# Patient Record
Sex: FEMALE | Race: WHITE | HISPANIC OR LATINO
[De-identification: names, ages, dates, MRNs, and addresses within clinical notes are randomized per-mention and may not be internally consistent; named-entity substitution may affect disease eponyms.]

---

## 2017-02-22 ENCOUNTER — APPOINTMENT (OUTPATIENT)
Dept: FAMILY MEDICINE | Facility: CLINIC | Age: 82
End: 2017-02-22

## 2017-02-22 VITALS
DIASTOLIC BLOOD PRESSURE: 70 MMHG | BODY MASS INDEX: 23.04 KG/M2 | HEIGHT: 63 IN | HEART RATE: 54 BPM | OXYGEN SATURATION: 97 % | WEIGHT: 130 LBS | TEMPERATURE: 97.8 F | SYSTOLIC BLOOD PRESSURE: 127 MMHG

## 2017-03-13 ENCOUNTER — APPOINTMENT (OUTPATIENT)
Dept: FAMILY MEDICINE | Facility: CLINIC | Age: 82
End: 2017-03-13

## 2017-03-13 VITALS
WEIGHT: 124 LBS | TEMPERATURE: 98 F | HEIGHT: 63 IN | SYSTOLIC BLOOD PRESSURE: 135 MMHG | OXYGEN SATURATION: 98 % | HEART RATE: 66 BPM | BODY MASS INDEX: 21.97 KG/M2 | DIASTOLIC BLOOD PRESSURE: 76 MMHG

## 2017-04-01 ENCOUNTER — MEDICATION RENEWAL (OUTPATIENT)
Age: 82
End: 2017-04-01

## 2017-04-05 ENCOUNTER — RX RENEWAL (OUTPATIENT)
Age: 82
End: 2017-04-05

## 2017-04-19 ENCOUNTER — APPOINTMENT (OUTPATIENT)
Dept: FAMILY MEDICINE | Facility: CLINIC | Age: 82
End: 2017-04-19

## 2017-04-19 VITALS
HEIGHT: 63 IN | BODY MASS INDEX: 21.79 KG/M2 | HEART RATE: 63 BPM | WEIGHT: 123 LBS | SYSTOLIC BLOOD PRESSURE: 137 MMHG | DIASTOLIC BLOOD PRESSURE: 77 MMHG | TEMPERATURE: 97.6 F | OXYGEN SATURATION: 98 %

## 2017-04-19 DIAGNOSIS — D58.2 OTHER HEMOGLOBINOPATHIES: ICD-10-CM

## 2017-05-01 LAB
25(OH)D3 SERPL-MCNC: 21.6 NG/ML
ALBUMIN SERPL ELPH-MCNC: 4.5 G/DL
ALP BLD-CCNC: 53 U/L
ALT SERPL-CCNC: 19 U/L
ANION GAP SERPL CALC-SCNC: 17 MMOL/L
AST SERPL-CCNC: 25 U/L
BASOPHILS # BLD AUTO: 0.02 K/UL
BASOPHILS NFR BLD AUTO: 0.2 %
BILIRUB SERPL-MCNC: 0.6 MG/DL
BUN SERPL-MCNC: 9 MG/DL
CALCIUM SERPL-MCNC: 9.1 MG/DL
CHLORIDE SERPL-SCNC: 102 MMOL/L
CO2 SERPL-SCNC: 22 MMOL/L
CREAT SERPL-MCNC: 0.68 MG/DL
EOSINOPHIL # BLD AUTO: 0.02 K/UL
EOSINOPHIL NFR BLD AUTO: 0.2 %
GLUCOSE SERPL-MCNC: 99 MG/DL
HCT VFR BLD CALC: 47.8 %
HGB BLD-MCNC: 15.5 G/DL
IMM GRANULOCYTES NFR BLD AUTO: 0.2 %
LYMPHOCYTES # BLD AUTO: 1.47 K/UL
LYMPHOCYTES NFR BLD AUTO: 14.8 %
MAN DIFF?: NORMAL
MCHC RBC-ENTMCNC: 30.8 PG
MCHC RBC-ENTMCNC: 32.4 GM/DL
MCV RBC AUTO: 94.8 FL
MONOCYTES # BLD AUTO: 0.58 K/UL
MONOCYTES NFR BLD AUTO: 5.9 %
NEUTROPHILS # BLD AUTO: 7.8 K/UL
NEUTROPHILS NFR BLD AUTO: 78.7 %
PLATELET # BLD AUTO: 209 K/UL
POTASSIUM SERPL-SCNC: 4.4 MMOL/L
PROT SERPL-MCNC: 7.6 G/DL
RBC # BLD: 5.04 M/UL
RBC # FLD: 14.2 %
SODIUM SERPL-SCNC: 141 MMOL/L
WBC # FLD AUTO: 9.91 K/UL

## 2017-05-16 ENCOUNTER — APPOINTMENT (OUTPATIENT)
Dept: FAMILY MEDICINE | Facility: CLINIC | Age: 82
End: 2017-05-16

## 2017-05-16 VITALS
WEIGHT: 124 LBS | OXYGEN SATURATION: 97 % | DIASTOLIC BLOOD PRESSURE: 66 MMHG | SYSTOLIC BLOOD PRESSURE: 117 MMHG | HEART RATE: 58 BPM | HEIGHT: 63 IN | TEMPERATURE: 97 F | BODY MASS INDEX: 21.97 KG/M2

## 2017-05-16 DIAGNOSIS — Z23 ENCOUNTER FOR IMMUNIZATION: ICD-10-CM

## 2017-06-19 ENCOUNTER — RESULT CHARGE (OUTPATIENT)
Age: 82
End: 2017-06-19

## 2017-06-20 ENCOUNTER — APPOINTMENT (OUTPATIENT)
Dept: FAMILY MEDICINE | Facility: CLINIC | Age: 82
End: 2017-06-20

## 2017-06-20 VITALS
DIASTOLIC BLOOD PRESSURE: 85 MMHG | WEIGHT: 122 LBS | HEART RATE: 67 BPM | OXYGEN SATURATION: 98 % | HEIGHT: 63 IN | TEMPERATURE: 97.9 F | BODY MASS INDEX: 21.62 KG/M2 | SYSTOLIC BLOOD PRESSURE: 173 MMHG

## 2017-06-20 VITALS — SYSTOLIC BLOOD PRESSURE: 146 MMHG | DIASTOLIC BLOOD PRESSURE: 72 MMHG

## 2017-06-20 DIAGNOSIS — L23.9 ALLERGIC CONTACT DERMATITIS, UNSPECIFIED CAUSE: ICD-10-CM

## 2017-06-20 DIAGNOSIS — R19.00 INTRA-ABDOMINAL AND PELVIC SWELLING, MASS AND LUMP, UNSPECIFIED SITE: ICD-10-CM

## 2017-06-20 DIAGNOSIS — Z71.89 OTHER SPECIFIED COUNSELING: ICD-10-CM

## 2017-06-22 LAB
25(OH)D3 SERPL-MCNC: 18.3 NG/ML
ANION GAP SERPL CALC-SCNC: 11 MMOL/L
BUN SERPL-MCNC: 13 MG/DL
CALCIUM SERPL-MCNC: 9.5 MG/DL
CHLORIDE SERPL-SCNC: 107 MMOL/L
CHOLEST SERPL-MCNC: 183 MG/DL
CHOLEST/HDLC SERPL: 2.5 RATIO
CO2 SERPL-SCNC: 24 MMOL/L
CREAT SERPL-MCNC: 0.74 MG/DL
GLUCOSE SERPL-MCNC: 106 MG/DL
HDLC SERPL-MCNC: 74 MG/DL
LDLC SERPL CALC-MCNC: 89 MG/DL
POTASSIUM SERPL-SCNC: 4.3 MMOL/L
SODIUM SERPL-SCNC: 142 MMOL/L
TRIGL SERPL-MCNC: 99 MG/DL
TSH SERPL-ACNC: 3.37 UIU/ML

## 2017-08-09 ENCOUNTER — RX RENEWAL (OUTPATIENT)
Age: 82
End: 2017-08-09

## 2017-08-31 ENCOUNTER — MEDICATION RENEWAL (OUTPATIENT)
Age: 82
End: 2017-08-31

## 2017-09-01 ENCOUNTER — RX RENEWAL (OUTPATIENT)
Age: 82
End: 2017-09-01

## 2017-09-07 ENCOUNTER — APPOINTMENT (OUTPATIENT)
Dept: FAMILY MEDICINE | Facility: CLINIC | Age: 82
End: 2017-09-07
Payer: MEDICARE

## 2017-09-07 VITALS
DIASTOLIC BLOOD PRESSURE: 68 MMHG | TEMPERATURE: 98.5 F | WEIGHT: 120 LBS | OXYGEN SATURATION: 97 % | SYSTOLIC BLOOD PRESSURE: 161 MMHG | HEIGHT: 63 IN | BODY MASS INDEX: 21.26 KG/M2 | HEART RATE: 62 BPM

## 2017-09-07 PROCEDURE — 90662 IIV NO PRSV INCREASED AG IM: CPT

## 2017-09-07 PROCEDURE — 99213 OFFICE O/P EST LOW 20 MIN: CPT | Mod: 25

## 2017-09-07 PROCEDURE — G0008: CPT

## 2017-09-11 ENCOUNTER — MEDICATION RENEWAL (OUTPATIENT)
Age: 82
End: 2017-09-11

## 2017-09-11 RX ORDER — ALPRAZOLAM 0.25 MG/1
0.25 TABLET ORAL
Qty: 60 | Refills: 1 | Status: COMPLETED | OUTPATIENT
Start: 2017-03-13 | End: 2017-09-11

## 2017-11-13 ENCOUNTER — APPOINTMENT (OUTPATIENT)
Dept: FAMILY MEDICINE | Facility: CLINIC | Age: 82
End: 2017-11-13
Payer: MEDICARE

## 2017-11-13 VITALS
TEMPERATURE: 97.6 F | HEART RATE: 60 BPM | OXYGEN SATURATION: 97 % | BODY MASS INDEX: 21.26 KG/M2 | DIASTOLIC BLOOD PRESSURE: 68 MMHG | SYSTOLIC BLOOD PRESSURE: 126 MMHG | HEIGHT: 63 IN | WEIGHT: 120 LBS

## 2017-11-13 PROCEDURE — 99214 OFFICE O/P EST MOD 30 MIN: CPT

## 2017-12-12 ENCOUNTER — APPOINTMENT (OUTPATIENT)
Dept: FAMILY MEDICINE | Facility: CLINIC | Age: 82
End: 2017-12-12
Payer: MEDICARE

## 2017-12-12 VITALS
HEIGHT: 63 IN | OXYGEN SATURATION: 96 % | SYSTOLIC BLOOD PRESSURE: 163 MMHG | BODY MASS INDEX: 21.62 KG/M2 | DIASTOLIC BLOOD PRESSURE: 78 MMHG | HEART RATE: 74 BPM | TEMPERATURE: 97.5 F | WEIGHT: 122 LBS

## 2017-12-12 PROCEDURE — 99213 OFFICE O/P EST LOW 20 MIN: CPT

## 2018-01-08 ENCOUNTER — MEDICATION RENEWAL (OUTPATIENT)
Age: 83
End: 2018-01-08

## 2018-01-24 ENCOUNTER — RX RENEWAL (OUTPATIENT)
Age: 83
End: 2018-01-24

## 2018-01-26 ENCOUNTER — RX RENEWAL (OUTPATIENT)
Age: 83
End: 2018-01-26

## 2018-01-26 ENCOUNTER — MEDICATION RENEWAL (OUTPATIENT)
Age: 83
End: 2018-01-26

## 2018-03-27 ENCOUNTER — APPOINTMENT (OUTPATIENT)
Dept: FAMILY MEDICINE | Facility: CLINIC | Age: 83
End: 2018-03-27
Payer: MEDICARE

## 2018-03-27 VITALS
HEIGHT: 63 IN | OXYGEN SATURATION: 97 % | BODY MASS INDEX: 21.62 KG/M2 | WEIGHT: 122 LBS | TEMPERATURE: 98.6 F | SYSTOLIC BLOOD PRESSURE: 143 MMHG | DIASTOLIC BLOOD PRESSURE: 62 MMHG | HEART RATE: 69 BPM

## 2018-03-27 PROCEDURE — 99213 OFFICE O/P EST LOW 20 MIN: CPT

## 2018-04-16 ENCOUNTER — RX RENEWAL (OUTPATIENT)
Age: 83
End: 2018-04-16

## 2018-05-31 ENCOUNTER — RX RENEWAL (OUTPATIENT)
Age: 83
End: 2018-05-31

## 2018-06-01 ENCOUNTER — APPOINTMENT (OUTPATIENT)
Dept: FAMILY MEDICINE | Facility: CLINIC | Age: 83
End: 2018-06-01
Payer: MEDICARE

## 2018-06-01 VITALS
SYSTOLIC BLOOD PRESSURE: 127 MMHG | WEIGHT: 128 LBS | OXYGEN SATURATION: 97 % | HEART RATE: 65 BPM | TEMPERATURE: 97.9 F | BODY MASS INDEX: 22.68 KG/M2 | HEIGHT: 63 IN | DIASTOLIC BLOOD PRESSURE: 72 MMHG

## 2018-06-01 PROCEDURE — 99213 OFFICE O/P EST LOW 20 MIN: CPT

## 2018-06-01 NOTE — HISTORY OF PRESENT ILLNESS
[FreeTextEntry1] : " I need prescription refills" [de-identified] : The patient presents to the office for prescription refills, no acute complaints at this time.

## 2018-06-01 NOTE — PHYSICAL EXAM
[No Acute Distress] : no acute distress [Well Nourished] : well nourished [Well Developed] : well developed [Well-Appearing] : well-appearing [No JVD] : no jugular venous distention [Supple] : supple [No Lymphadenopathy] : no lymphadenopathy [Thyroid Normal, No Nodules] : the thyroid was normal and there were no nodules present [No Respiratory Distress] : no respiratory distress  [Clear to Auscultation] : lungs were clear to auscultation bilaterally [No Accessory Muscle Use] : no accessory muscle use [Normal Rate] : normal rate  [Regular Rhythm] : with a regular rhythm [Normal S1, S2] : normal S1 and S2 [No Murmur] : no murmur heard [General Appearance - Alert] : alert [General Appearance - In No Acute Distress] : in no acute distress [Neck Appearance] : the appearance of the neck was normal [Neck Cervical Mass (___cm)] : no neck mass was observed [Jugular Venous Distention Increased] : there was no jugular-venous distention [Thyroid Diffuse Enlargement] : the thyroid was not enlarged [Thyroid Nodule] : there were no palpable thyroid nodules [] : no respiratory distress [Auscultation Breath Sounds / Voice Sounds] : lungs were clear to auscultation bilaterally [Heart Rate And Rhythm] : heart rate was normal and rhythm regular [Heart Sounds] : normal S1 and S2 [Heart Sounds Gallop] : no gallops [Murmurs] : no murmurs [Heart Sounds Pericardial Friction Rub] : no pericardial rub

## 2018-06-29 ENCOUNTER — MEDICATION RENEWAL (OUTPATIENT)
Age: 83
End: 2018-06-29

## 2018-07-02 ENCOUNTER — MEDICATION RENEWAL (OUTPATIENT)
Age: 83
End: 2018-07-02

## 2018-07-02 ENCOUNTER — RX RENEWAL (OUTPATIENT)
Age: 83
End: 2018-07-02

## 2018-07-12 ENCOUNTER — RX RENEWAL (OUTPATIENT)
Age: 83
End: 2018-07-12

## 2018-07-12 ENCOUNTER — APPOINTMENT (OUTPATIENT)
Dept: FAMILY MEDICINE | Facility: CLINIC | Age: 83
End: 2018-07-12

## 2018-07-23 ENCOUNTER — APPOINTMENT (OUTPATIENT)
Dept: FAMILY MEDICINE | Facility: CLINIC | Age: 83
End: 2018-07-23
Payer: MEDICARE

## 2018-07-23 VITALS
DIASTOLIC BLOOD PRESSURE: 75 MMHG | HEIGHT: 63 IN | TEMPERATURE: 97.8 F | HEART RATE: 65 BPM | OXYGEN SATURATION: 100 % | WEIGHT: 126 LBS | SYSTOLIC BLOOD PRESSURE: 130 MMHG | BODY MASS INDEX: 22.32 KG/M2

## 2018-07-23 DIAGNOSIS — M25.579 PAIN IN UNSPECIFIED ANKLE AND JOINTS OF UNSPECIFIED FOOT: ICD-10-CM

## 2018-07-23 DIAGNOSIS — Z91.81 HISTORY OF FALLING: ICD-10-CM

## 2018-07-23 DIAGNOSIS — Z92.29 PERSONAL HISTORY OF OTHER DRUG THERAPY: ICD-10-CM

## 2018-07-23 DIAGNOSIS — Z23 ENCOUNTER FOR IMMUNIZATION: ICD-10-CM

## 2018-07-23 DIAGNOSIS — Z86.59 PERSONAL HISTORY OF OTHER MENTAL AND BEHAVIORAL DISORDERS: ICD-10-CM

## 2018-07-23 DIAGNOSIS — Z87.09 PERSONAL HISTORY OF OTHER DISEASES OF THE RESPIRATORY SYSTEM: ICD-10-CM

## 2018-07-23 DIAGNOSIS — M25.519 PAIN IN UNSPECIFIED SHOULDER: ICD-10-CM

## 2018-07-23 DIAGNOSIS — Z78.9 OTHER SPECIFIED HEALTH STATUS: ICD-10-CM

## 2018-07-23 DIAGNOSIS — M79.646 PAIN IN UNSPECIFIED FINGER(S): ICD-10-CM

## 2018-07-23 LAB
ALBUMIN SERPL ELPH-MCNC: 4.7 G/DL
ALP BLD-CCNC: 56 U/L
ALT SERPL-CCNC: 19 U/L
ANION GAP SERPL CALC-SCNC: 14 MMOL/L
APPEARANCE: ABNORMAL
AST SERPL-CCNC: 30 U/L
BACTERIA: NEGATIVE
BASOPHILS # BLD AUTO: 0.03 K/UL
BASOPHILS NFR BLD AUTO: 0.4 %
BILIRUB SERPL-MCNC: 0.7 MG/DL
BILIRUBIN URINE: NEGATIVE
BLOOD URINE: NEGATIVE
BUN SERPL-MCNC: 14 MG/DL
CALCIUM SERPL-MCNC: 9.2 MG/DL
CHLORIDE SERPL-SCNC: 105 MMOL/L
CHOLEST SERPL-MCNC: 181 MG/DL
CHOLEST/HDLC SERPL: 2.8 RATIO
CO2 SERPL-SCNC: 22 MMOL/L
COLOR: YELLOW
CREAT SERPL-MCNC: 0.57 MG/DL
EOSINOPHIL # BLD AUTO: 0.11 K/UL
EOSINOPHIL NFR BLD AUTO: 1.3 %
GLUCOSE QUALITATIVE U: NEGATIVE
GLUCOSE SERPL-MCNC: 102 MG/DL
HCT VFR BLD CALC: 43.4 %
HDLC SERPL-MCNC: 65 MG/DL
HGB BLD-MCNC: 14.8 G/DL
HIV1+2 AB SPEC QL IA.RAPID: NONREACTIVE
IMM GRANULOCYTES NFR BLD AUTO: 0.4 %
KETONES URINE: NEGATIVE
LDLC SERPL CALC-MCNC: 97 MG/DL
LEUKOCYTE ESTERASE URINE: NEGATIVE
LYMPHOCYTES # BLD AUTO: 1.74 K/UL
LYMPHOCYTES NFR BLD AUTO: 21.1 %
MAN DIFF?: NORMAL
MCHC RBC-ENTMCNC: 31.9 PG
MCHC RBC-ENTMCNC: 34.1 GM/DL
MCV RBC AUTO: 93.5 FL
MICROSCOPIC-UA: NORMAL
MONOCYTES # BLD AUTO: 0.53 K/UL
MONOCYTES NFR BLD AUTO: 6.4 %
NEUTROPHILS # BLD AUTO: 5.79 K/UL
NEUTROPHILS NFR BLD AUTO: 70.4 %
NITRITE URINE: NEGATIVE
PH URINE: 6
PLATELET # BLD AUTO: 212 K/UL
POTASSIUM SERPL-SCNC: 4.2 MMOL/L
PROT SERPL-MCNC: 7.7 G/DL
PROTEIN URINE: ABNORMAL
RBC # BLD: 4.64 M/UL
RBC # FLD: 14.2 %
RED BLOOD CELLS URINE: 2 /HPF
SODIUM SERPL-SCNC: 141 MMOL/L
SPECIFIC GRAVITY URINE: 1.02
SQUAMOUS EPITHELIAL CELLS: NEGATIVE
TRIGL SERPL-MCNC: 95 MG/DL
URINE COMMENTS: NORMAL
UROBILINOGEN URINE: NEGATIVE
WBC # FLD AUTO: 8.23 K/UL
WHITE BLOOD CELLS URINE: 2 /HPF

## 2018-07-23 PROCEDURE — G0439: CPT | Mod: 25

## 2018-07-23 PROCEDURE — 36415 COLL VENOUS BLD VENIPUNCTURE: CPT | Mod: 59

## 2018-07-23 NOTE — HEALTH RISK ASSESSMENT
[Good] : ~his/her~  mood as  good [One fall no injury in past year] : Patient reported one fall in the past year without injury [0] : 2) Feeling down, depressed, or hopeless: Not at all (0) [Patient reported PAP Smear was normal] : Patient reported PAP Smear was normal [HIV Test offered] : HIV Test offered [Hepatitis C test offered] : Hepatitis C test offered [None] : None [With Family] : lives with family [# of Members in Household ___] :  household currently consist of [unfilled] member(s) [Retired] : retired [College] : College [] :  [# Of Children ___] : has [unfilled] children [Feels Safe at Home] : Feels safe at home [Fully functional (bathing, dressing, toileting, transferring, walking, feeding)] : Fully functional (bathing, dressing, toileting, transferring, walking, feeding) [Fully functional (using the telephone, shopping, preparing meals, housekeeping, doing laundry, using] : Fully functional and needs no help or supervision to perform IADLs (using the telephone, shopping, preparing meals, housekeeping, doing laundry, using transportation, managing medications and managing finances) [Smoke Detector] : smoke detector [Carbon Monoxide Detector] : carbon monoxide detector [Seat Belt] :  uses seat belt [] : No [de-identified] : fell at home 2 weeks ago, slipped coming out of bed. [[id number]] : 0 [Change in mental status noted] : No change in mental status noted [Language] : denies difficulty with language [Behavior] : denies difficulty with behavior [Learning/Retaining New Information] : denies difficulty learning/retaining new information [Handling Complex Tasks] : denies difficulty handling complex tasks [Reasoning] : denies difficulty with reasoning [Spatial Ability and Orientation] : denies difficulty with spatial ability and orientation [Sexually Active] : not sexually active [High Risk Behavior] : no high risk behavior [Reports changes in hearing] : Reports no changes in hearing [Reports changes in vision] : Reports no changes in vision [Reports changes in dental health] : Reports no changes in dental health [Guns at Home] : no guns at home [Sunscreen] : does not use sunscreen [TB Exposure] : is not being exposed to tuberculosis [MammogramDate] : 10/207 [MammogramComments] : BI-RADS 2 [PapSmearDate] : 10/2017 [BoneDensityDate] : 10/2016 [BoneDensityComments] : Osteoporosis [ColonoscopyDate] : CANNOT RECALL [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

## 2018-07-23 NOTE — COUNSELING
[Weight management counseling provided] : Weight management [Healthy eating counseling provided] : healthy eating [Activity counseling provided] : activity [Behavioral health counseling provided] : behavioral health  [Sleep ___ hours/day] : Sleep [unfilled] hours/day [None] : None [Good understanding] : Patient has a good understanding of lifestyle changes and the steps needed to achieve self management goals

## 2018-07-23 NOTE — HISTORY OF PRESENT ILLNESS
[FreeTextEntry1] : "I am here for my physical exam " [de-identified] : This is an 86-year-old female originally from [facility] with past medical history of hypercholesterolemia, hypertension, insomnia, osteoporosis, memory difficulties, right bundle branch block, vitamin D deficiency, anxiety, presenting to my office for complete physical exam. The patient offers no new complaints at this time.

## 2018-07-23 NOTE — ASSESSMENT
[FreeTextEntry1] : This is an 86-year-old female [facility] with past medical history of hypercholesterolemia, hypertension, insomnia, osteoporosis, breast CA s/p Right breast Mastectomy 10 yrs ago, memory difficulties, right bundle branch block, vitamin D deficiency, anxiety, presenting to my office for complete physical exam.\par \par Cardiovascular: History of hyperlipidemia, hypertension, right bundle branch block.\par -Patient's blood pressure currently well-controlled.\par -Continue pravastatin 20 mg at bedtime\par -Continue current diet and exercise as tolerated.\par \par Psych/neuro: History anxiety, memory difficulties.\par -Continue alprazolam 0.25 mg t.i.d. p.r.n., I stop reviewed.\par -Continue Namenda 10 mg daily, continue Seroquel 25 mg twice a day.\par -Patient advised to spend some time with memory games, crossword puzzles.\par \par Rheumatology: History of osteoporosis, vitamin D insufficiency.\par -Continue alendronate 70 mg once a week.\par -Patient on vitamin D supplementation, we will recheck levels today and dose medication accordingly.\par \par Health care maintenance:\par -Patient would have asked him to return in the office today.\par -Last mammogram in October 2017, Bi-rads 2\par -Last PAP smear last year, as per GYN no need for further examinations.\par -Last Bone Density 2016 found with Osteoporosis.\par -Pt states had colonoscopy many years ago, found with no abnormalities.

## 2018-07-23 NOTE — PAST MEDICAL HISTORY
[Postmenopausal] : postmenopausal [Menarche Age ____] : age at menarche was [unfilled] [Total Preg ___] : G[unfilled] [Live Births ___] : P[unfilled]  [Full Term ___] : Full Term: [unfilled] [Living ___] : Living: [unfilled]

## 2018-07-23 NOTE — PHYSICAL EXAM

## 2018-07-24 LAB
25(OH)D3 SERPL-MCNC: 14.1 NG/ML
HCV AB SER QL: NONREACTIVE
HCV S/CO RATIO: 0.09 S/CO
TSH SERPL-ACNC: 3.6 UIU/ML

## 2018-08-20 ENCOUNTER — RX RENEWAL (OUTPATIENT)
Age: 83
End: 2018-08-20

## 2018-09-21 ENCOUNTER — RX RENEWAL (OUTPATIENT)
Age: 83
End: 2018-09-21

## 2018-10-08 ENCOUNTER — RX RENEWAL (OUTPATIENT)
Age: 83
End: 2018-10-08

## 2018-12-20 ENCOUNTER — MEDICATION RENEWAL (OUTPATIENT)
Age: 83
End: 2018-12-20

## 2018-12-21 ENCOUNTER — APPOINTMENT (OUTPATIENT)
Dept: FAMILY MEDICINE | Facility: CLINIC | Age: 83
End: 2018-12-21
Payer: MEDICARE

## 2018-12-21 VITALS
BODY MASS INDEX: 22.5 KG/M2 | DIASTOLIC BLOOD PRESSURE: 75 MMHG | SYSTOLIC BLOOD PRESSURE: 170 MMHG | HEART RATE: 82 BPM | TEMPERATURE: 97.8 F | OXYGEN SATURATION: 96 % | WEIGHT: 127 LBS | HEIGHT: 63 IN

## 2018-12-21 DIAGNOSIS — E78.00 PURE HYPERCHOLESTEROLEMIA, UNSPECIFIED: ICD-10-CM

## 2018-12-21 PROCEDURE — 99213 OFFICE O/P EST LOW 20 MIN: CPT

## 2018-12-21 NOTE — HEALTH RISK ASSESSMENT
[No falls in past year] : Patient reported no falls in the past year [0] : 2) Feeling down, depressed, or hopeless: Not at all (0) [] : No [[id number]] : 0

## 2018-12-21 NOTE — ASSESSMENT
[FreeTextEntry1] : This is an 86-year-old female originally from [facility] with past medical history of hypercholesterolemia, hypertension, insomnia, osteoporosis, breast CA s/p Right breast Mastectomy 10 yrs ago, memory difficulties, right bundle branch block, vitamin D deficiency, anxiety, presenting to the office for complete physical exam.\par \par Cardiovascular: History of hyperlipidemia, hypertension, right bundle branch block.\par -Patient's blood pressure slightly elevated today, maintain current regimen.\par -Continue pravastatin 20 mg at bedtime\par -Continue current diet and exercise as tolerated.\par \par Psych/neuro: History anxiety, memory difficulties.\par -Continue alprazolam 0.25 mg t.i.d. p.r.n., I stop reviewed.\par -Continue Namenda 10 mg daily, continue Seroquel 25 mg twice a day.\par -Patient advised to spend some time with memory games, crossword puzzles.\par \par Rheumatology: History of osteoporosis, vitamin D insufficiency.\par -Continue alendronate 70 mg once a week.\par -Patient on vitamin D supplementation, medication refilled.\par \par Health care maintenance:\par -Last mammogram in October 2017, Bi-rads 2\par -Last PAP smear last year, as per GYN no need for further examinations.\par -Last Bone Density 2016 found with Osteoporosis.\par -Pt states had colonoscopy many years ago, found with no abnormalities.

## 2018-12-21 NOTE — HISTORY OF PRESENT ILLNESS
[FreeTextEntry1] : " I need prescription refills" [de-identified] : The patient presents to the office for prescription refills, no acute complaints at this time.

## 2018-12-21 NOTE — PLAN
[FreeTextEntry1] : \par \par Case discussed with and reviewed by supervising attending Dr Yolis Gifford M.D.

## 2018-12-21 NOTE — PHYSICAL EXAM
[No Acute Distress] : no acute distress [Well Nourished] : well nourished [Well Developed] : well developed [Well-Appearing] : well-appearing [No JVD] : no jugular venous distention [Supple] : supple [No Lymphadenopathy] : no lymphadenopathy [Thyroid Normal, No Nodules] : the thyroid was normal and there were no nodules present [No Respiratory Distress] : no respiratory distress  [Clear to Auscultation] : lungs were clear to auscultation bilaterally [No Accessory Muscle Use] : no accessory muscle use [Normal Rate] : normal rate  [Regular Rhythm] : with a regular rhythm [Normal S1, S2] : normal S1 and S2 [No Murmur] : no murmur heard [Normal Gait] : normal gait [Coordination Grossly Intact] : coordination grossly intact [No Focal Deficits] : no focal deficits [General Appearance - Alert] : alert [General Appearance - In No Acute Distress] : in no acute distress [Neck Appearance] : the appearance of the neck was normal [Neck Cervical Mass (___cm)] : no neck mass was observed [Jugular Venous Distention Increased] : there was no jugular-venous distention [Thyroid Diffuse Enlargement] : the thyroid was not enlarged [Thyroid Nodule] : there were no palpable thyroid nodules [] : no respiratory distress [Auscultation Breath Sounds / Voice Sounds] : lungs were clear to auscultation bilaterally [Heart Rate And Rhythm] : heart rate was normal and rhythm regular [Heart Sounds] : normal S1 and S2 [Heart Sounds Gallop] : no gallops [Murmurs] : no murmurs [Heart Sounds Pericardial Friction Rub] : no pericardial rub

## 2019-01-11 ENCOUNTER — RX RENEWAL (OUTPATIENT)
Age: 84
End: 2019-01-11

## 2019-01-22 ENCOUNTER — MEDICATION RENEWAL (OUTPATIENT)
Age: 84
End: 2019-01-22

## 2019-01-25 ENCOUNTER — MEDICATION RENEWAL (OUTPATIENT)
Age: 84
End: 2019-01-25

## 2019-02-04 PROBLEM — Z71.89 COUNSELING REGARDING ADVANCED DIRECTIVES: Status: ACTIVE | Noted: 2017-06-20

## 2019-03-26 ENCOUNTER — APPOINTMENT (OUTPATIENT)
Dept: FAMILY MEDICINE | Facility: CLINIC | Age: 84
End: 2019-03-26
Payer: MEDICARE

## 2019-03-26 VITALS
OXYGEN SATURATION: 98 % | DIASTOLIC BLOOD PRESSURE: 77 MMHG | HEIGHT: 63 IN | HEART RATE: 65 BPM | SYSTOLIC BLOOD PRESSURE: 155 MMHG | WEIGHT: 124 LBS | TEMPERATURE: 97.5 F | BODY MASS INDEX: 21.97 KG/M2

## 2019-03-26 DIAGNOSIS — J30.9 ALLERGIC RHINITIS, UNSPECIFIED: ICD-10-CM

## 2019-03-26 PROCEDURE — 99214 OFFICE O/P EST MOD 30 MIN: CPT

## 2019-03-26 NOTE — ASSESSMENT
[FreeTextEntry1] : This is an 86-year-old female originally from [facility] with past medical history of hypercholesterolemia, hypertension, insomnia, osteoporosis, breast CA s/p Right breast Mastectomy 10 yrs ago, memory difficulties, right bundle branch block, vitamin D deficiency, anxiety, presenting to the office for medication refills and c/o continued runny nose.\par \par Cardiovascular: History of hyperlipidemia, hypertension, right bundle branch block.\par -Patient's blood pressure slightly elevated today, no need for medication at this time.\par -Continue pravastatin 20 mg at bedtime\par -Continue current diet and exercise as tolerated.\par \par Psych/neuro: History anxiety, memory difficulties.\par -Continue alprazolam 0.25 mg t.i.d. p.r.n., no Rx given today, uncertain wether she needs more, last Rx in December.\par -Continue Namenda 10 mg daily, continue Seroquel 25 mg twice a day.\par -Patient advised to spend some time with memory games, crossword puzzles.\par \par Rheumatology: History of osteoporosis, vitamin D insufficiency.\par -Continue alendronate 70 mg once a week.\par -Patient on vitamin D supplementation, medication refilled.\par \par Health care maintenance:\par -Last mammogram in October 2017, Bi-rads 2\par -Last PAP smear last year, as per GYN no need for further examinations.\par -Last Bone Density 2016 found with Osteoporosis, no further bone density testing.\par -Pt states had colonoscopy many years ago, found with no abnormalities.

## 2019-03-26 NOTE — HISTORY OF PRESENT ILLNESS
[FreeTextEntry8] : Presents for prescription refills, c/o continued runny nose throughout the day. Pt denies any CP, SOB, dizziness, palpitations. Requests referral for GYN for Annual PAP.

## 2019-03-26 NOTE — HEALTH RISK ASSESSMENT
[No falls in past year] : Patient reported no falls in the past year [0] : 2) Feeling down, depressed, or hopeless: Not at all (0) [] : No [[id number]] : 0

## 2019-04-02 ENCOUNTER — MEDICATION RENEWAL (OUTPATIENT)
Age: 84
End: 2019-04-02

## 2019-04-15 ENCOUNTER — MEDICATION RENEWAL (OUTPATIENT)
Age: 84
End: 2019-04-15

## 2019-04-20 ENCOUNTER — RX RENEWAL (OUTPATIENT)
Age: 84
End: 2019-04-20

## 2019-05-06 ENCOUNTER — RX RENEWAL (OUTPATIENT)
Age: 84
End: 2019-05-06

## 2019-07-02 ENCOUNTER — APPOINTMENT (OUTPATIENT)
Dept: FAMILY MEDICINE | Facility: CLINIC | Age: 84
End: 2019-07-02
Payer: MEDICARE

## 2019-07-02 ENCOUNTER — MEDICATION RENEWAL (OUTPATIENT)
Age: 84
End: 2019-07-02

## 2019-07-02 VITALS
SYSTOLIC BLOOD PRESSURE: 150 MMHG | WEIGHT: 123 LBS | DIASTOLIC BLOOD PRESSURE: 78 MMHG | HEIGHT: 63 IN | OXYGEN SATURATION: 97 % | BODY MASS INDEX: 21.79 KG/M2 | HEART RATE: 63 BPM | TEMPERATURE: 98 F

## 2019-07-02 PROCEDURE — 99213 OFFICE O/P EST LOW 20 MIN: CPT

## 2019-07-02 RX ORDER — MULTIVIT-MINS NO.7/FOLIC ACID 1 MG
CAPSULE ORAL
Qty: 90 | Refills: 1 | Status: COMPLETED | COMMUNITY
Start: 2017-11-13 | End: 2019-07-02

## 2019-07-02 NOTE — HISTORY OF PRESENT ILLNESS
[FreeTextEntry1] : Medication refills. [de-identified] : This is an 86-year-old female originally from [facility] with past medical history of hypercholesterolemia, hypertension, insomnia, osteoporosis, breast CA s/p Right breast Mastectomy 10 yrs ago, memory difficulties, right bundle branch block, vitamin D deficiency, anxiety, presenting to the office for medication refills.

## 2019-07-02 NOTE — HEALTH RISK ASSESSMENT
[No falls in past year] : Patient reported no falls in the past year [0] : 2) Feeling down, depressed, or hopeless: Not at all (0) [] : No [[id number]] : 0

## 2019-07-02 NOTE — ASSESSMENT
[FreeTextEntry1] : This is an 86-year-old female originally from [facility] with past medical history of hypercholesterolemia, hypertension, insomnia, osteoporosis, breast CA s/p Right breast Mastectomy 10 yrs ago, memory difficulties, right bundle branch block, vitamin D deficiency, anxiety, presenting to the office for medication refills.\par \par Cardiovascular: History of hyperlipidemia, hypertension, right bundle branch block.\par -Patient's blood pressure is slightly elevated today, no need for medication at this time.\par -Continue pravastatin 20 mg at bedtime\par -Continue current diet and exercise as tolerated.\par \par Psych/neuro: History anxiety, memory difficulties.\par -Continue alprazolam 0.25 mg t.i.d. p.r.n.,Rx given today. I-STOP reviewed.\par -Continue Namenda 10 mg daily, continue Seroquel 25 mg twice a day.\par -Patient advised to spend some time with memory games, crossword puzzles.\par \par Rheumatology: History of osteoporosis, vitamin D insufficiency.\par -Continue alendronate 70 mg once a week.\par -Patient on vitamin D supplementation, medication refilled.\par \par Health care maintenance:\par -Last mammogram in October 2017, Bi-rads 2\par -Last PAP smear last year, as per GYN no need for further examinations.\par -Last Bone Density 2016 found with Osteoporosis, no further bone density testing.\par -Pt states had colonoscopy many years ago, found with no abnormalities.\par -RTO in one month for CPE

## 2019-07-02 NOTE — PHYSICAL EXAM
[Normal] : normal rate, regular rhythm, normal S1 and S2 and no murmur heard [de-identified] : Onychia of toe nails.

## 2019-07-04 ENCOUNTER — RX RENEWAL (OUTPATIENT)
Age: 84
End: 2019-07-04

## 2019-08-06 ENCOUNTER — APPOINTMENT (OUTPATIENT)
Dept: FAMILY MEDICINE | Facility: CLINIC | Age: 84
End: 2019-08-06
Payer: MEDICARE

## 2019-08-06 VITALS
HEART RATE: 64 BPM | TEMPERATURE: 97.4 F | WEIGHT: 127 LBS | DIASTOLIC BLOOD PRESSURE: 67 MMHG | SYSTOLIC BLOOD PRESSURE: 144 MMHG | HEIGHT: 63 IN | BODY MASS INDEX: 22.5 KG/M2 | OXYGEN SATURATION: 95 %

## 2019-08-06 DIAGNOSIS — Z00.00 ENCOUNTER FOR GENERAL ADULT MEDICAL EXAMINATION W/OUT ABNORMAL FINDINGS: ICD-10-CM

## 2019-08-06 PROCEDURE — G0439: CPT | Mod: 25

## 2019-08-06 PROCEDURE — 36415 COLL VENOUS BLD VENIPUNCTURE: CPT

## 2019-08-06 NOTE — COUNSELING
[Fall prevention counseling provided] : Fall prevention counseling provided [Adequate lighting] : Adequate lighting [Use proper foot wear] : Use proper foot wear [No throw rugs] : No throw rugs [Use recommended devices] : Use recommended devices [AUDIT-C Screening administered and reviewed] : AUDIT-C Screening administered and reviewed [Healthy eating counseling provided] : healthy eating [Weight management counseling provided] : Weight management [Activity counseling provided] : activity [Sleep ___ hours/day] : Sleep [unfilled] hours/day [Behavioral health counseling provided] : behavioral health  [None] : None [Good understanding] : Patient has a good understanding of lifestyle changes and the steps needed to achieve self management goals

## 2019-08-06 NOTE — PAST MEDICAL HISTORY
[Postmenopausal] : postmenopausal [Menarche Age ____] : age at menarche was [unfilled] [Total Preg ___] : G[unfilled] [Live Births ___] : P[unfilled]  [Living ___] : Living: [unfilled] [Full Term ___] : Full Term: [unfilled]

## 2019-08-06 NOTE — PHYSICAL EXAM
[No Acute Distress] : no acute distress [Well Nourished] : well nourished [Well Developed] : well developed [Well-Appearing] : well-appearing [Normal Sclera/Conjunctiva] : normal sclera/conjunctiva [PERRL] : pupils equal round and reactive to light [EOMI] : extraocular movements intact [Normal Oropharynx] : the oropharynx was normal [Normal Outer Ear/Nose] : the outer ears and nose were normal in appearance [No Lymphadenopathy] : no lymphadenopathy [No JVD] : no jugular venous distention [Supple] : supple [Thyroid Normal, No Nodules] : the thyroid was normal and there were no nodules present [No Accessory Muscle Use] : no accessory muscle use [No Respiratory Distress] : no respiratory distress  [Clear to Auscultation] : lungs were clear to auscultation bilaterally [Normal Rate] : normal rate  [Regular Rhythm] : with a regular rhythm [Normal S1, S2] : normal S1 and S2 [No Murmur] : no murmur heard [No Carotid Bruits] : no carotid bruits [No Abdominal Bruit] : a ~M bruit was not heard ~T in the abdomen [No Varicosities] : no varicosities [Pedal Pulses Present] : the pedal pulses are present [No Edema] : there was no peripheral edema [No Palpable Aorta] : no palpable aorta [No Extremity Clubbing/Cyanosis] : no extremity clubbing/cyanosis [Soft] : abdomen soft [Non-distended] : non-distended [Non Tender] : non-tender [No Masses] : no abdominal mass palpated [Normal Bowel Sounds] : normal bowel sounds [No HSM] : no HSM [Normal Posterior Cervical Nodes] : no posterior cervical lymphadenopathy [Normal Anterior Cervical Nodes] : no anterior cervical lymphadenopathy [No CVA Tenderness] : no CVA  tenderness [No Joint Swelling] : no joint swelling [No Spinal Tenderness] : no spinal tenderness [Grossly Normal Strength/Tone] : grossly normal strength/tone [Coordination Grossly Intact] : coordination grossly intact [No Rash] : no rash [No Focal Deficits] : no focal deficits [Normal Gait] : normal gait [Deep Tendon Reflexes (DTR)] : deep tendon reflexes were 2+ and symmetric [Normal Affect] : the affect was normal [Normal Insight/Judgement] : insight and judgment were intact [de-identified] : Poor dentition

## 2019-08-06 NOTE — HEALTH RISK ASSESSMENT
[Good] : ~his/her~  mood as  good [No] : In the past 12 months have you used drugs other than those required for medical reasons? No [No falls in past year] : Patient reported no falls in the past year [0] : 2) Feeling down, depressed, or hopeless: Not at all (0) [Patient reported PAP Smear was normal] : Patient reported PAP Smear was normal [None] : None [With Family] : lives with family [# of Members in Household ___] :  household currently consist of [unfilled] member(s) [Retired] : retired [] :  [College] : College [# Of Children ___] : has [unfilled] children [Feels Safe at Home] : Feels safe at home [Fully functional (bathing, dressing, toileting, transferring, walking, feeding)] : Fully functional (bathing, dressing, toileting, transferring, walking, feeding) [Fully functional (using the telephone, shopping, preparing meals, housekeeping, doing laundry, using] : Fully functional and needs no help or supervision to perform IADLs (using the telephone, shopping, preparing meals, housekeeping, doing laundry, using transportation, managing medications and managing finances) [Smoke Detector] : smoke detector [Carbon Monoxide Detector] : carbon monoxide detector [Seat Belt] :  uses seat belt [Patient/Caregiver unclear of wishes] : Patient/Caregiver unclear of wishes [] : No [Audit-CScore] : 0 [de-identified] : none [de-identified] : regular [[id number]] : 0 [Change in mental status noted] : No change in mental status noted [Language] : denies difficulty with language [Behavior] : denies difficulty with behavior [Learning/Retaining New Information] : denies difficulty learning/retaining new information [Handling Complex Tasks] : denies difficulty handling complex tasks [Reasoning] : denies difficulty with reasoning [Spatial Ability and Orientation] : denies difficulty with spatial ability and orientation [Sexually Active] : not sexually active [High Risk Behavior] : no high risk behavior [Reports changes in hearing] : Reports no changes in hearing [Reports changes in vision] : Reports no changes in vision [Reports changes in dental health] : Reports no changes in dental health [Guns at Home] : no guns at home [Sunscreen] : does not use sunscreen [TB Exposure] : is not being exposed to tuberculosis [MammogramDate] : 10/207 [MammogramComments] : BI-RADS 2 [PapSmearDate] : 10/2017 [BoneDensityDate] : 10/2016 [BoneDensityComments] : Osteoporosis [ColonoscopyDate] : CANNOT RECALL [HIVDate] : 07/18 [HepatitisCDate] : 07/18 [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

## 2019-08-06 NOTE — HISTORY OF PRESENT ILLNESS
[FreeTextEntry1] : Physical exam [de-identified] : This is an 86-year-old female originally from [facility] with past medical history of hypercholesterolemia, hypertension, insomnia, osteoporosis, memory difficulties, right bundle branch block, vitamin D deficiency, anxiety, presenting to the office for complete physical exam. The patient offers no new complaints at this time.

## 2019-08-06 NOTE — ASSESSMENT
[FreeTextEntry1] : This is an 86-year-old female originally from [facility] with past medical history of hypercholesterolemia, hypertension, insomnia, osteoporosis, breast CA s/p Right breast Mastectomy 10 yrs ago, memory difficulties, right bundle branch block, vitamin D deficiency, anxiety, presenting to the office for complete physical exam.\par \par Cardiovascular: History of hyperlipidemia, hypertension, right bundle branch block.\par -Patient's blood pressure currently well-controlled.\par -Continue pravastatin 20 mg at bedtime\par -Continue current diet and exercise as tolerated.\par \par Psych/neuro: History anxiety, memory difficulties.\par -Continue alprazolam 0.25 mg t.i.d. p.r.n., I stop reviewed.\par -Continue Namenda 10 mg daily, continue Seroquel 25 mg twice a day.\par \par Rheumatology: History of osteoporosis, vitamin D insufficiency.\par -Continue alendronate 70 mg once a week.\par -Patient on vitamin D supplementation, we will recheck levels today and dose medication accordingly.\par \par Health care maintenance:\par -Blood work drawn in office today.\par -Last mammogram in October 2017, Bi-rads 2\par -Last PAP smear last year, as per GYN no need for further examinations.\par -Last Bone Density 2016 found with Osteoporosis.\par -Pt states had colonoscopy many years ago, found with no abnormalities.

## 2019-08-07 LAB
25(OH)D3 SERPL-MCNC: 15.7 NG/ML
ALBUMIN SERPL ELPH-MCNC: 4.6 G/DL
ALP BLD-CCNC: 59 U/L
ALT SERPL-CCNC: 16 U/L
ANION GAP SERPL CALC-SCNC: 13 MMOL/L
APPEARANCE: CLEAR
AST SERPL-CCNC: 22 U/L
BACTERIA: NEGATIVE
BASOPHILS # BLD AUTO: 0.04 K/UL
BASOPHILS NFR BLD AUTO: 0.5 %
BILIRUB SERPL-MCNC: 0.3 MG/DL
BILIRUBIN URINE: NEGATIVE
BLOOD URINE: NEGATIVE
BUN SERPL-MCNC: 12 MG/DL
CALCIUM SERPL-MCNC: 9.2 MG/DL
CHLORIDE SERPL-SCNC: 106 MMOL/L
CHOLEST SERPL-MCNC: 164 MG/DL
CHOLEST/HDLC SERPL: 2.8 RATIO
CO2 SERPL-SCNC: 26 MMOL/L
COLOR: NORMAL
CREAT SERPL-MCNC: 0.66 MG/DL
EOSINOPHIL # BLD AUTO: 0.21 K/UL
EOSINOPHIL NFR BLD AUTO: 2.7 %
GLUCOSE QUALITATIVE U: NEGATIVE
GLUCOSE SERPL-MCNC: 97 MG/DL
HCT VFR BLD CALC: 47.4 %
HDLC SERPL-MCNC: 58 MG/DL
HGB BLD-MCNC: 14.4 G/DL
HYALINE CASTS: 0 /LPF
IMM GRANULOCYTES NFR BLD AUTO: 1 %
KETONES URINE: NEGATIVE
LDLC SERPL CALC-MCNC: 78 MG/DL
LEUKOCYTE ESTERASE URINE: NEGATIVE
LYMPHOCYTES # BLD AUTO: 1.66 K/UL
LYMPHOCYTES NFR BLD AUTO: 21.1 %
MAN DIFF?: NORMAL
MCHC RBC-ENTMCNC: 30.4 GM/DL
MCHC RBC-ENTMCNC: 31 PG
MCV RBC AUTO: 101.9 FL
MICROSCOPIC-UA: NORMAL
MONOCYTES # BLD AUTO: 0.54 K/UL
MONOCYTES NFR BLD AUTO: 6.9 %
NEUTROPHILS # BLD AUTO: 5.32 K/UL
NEUTROPHILS NFR BLD AUTO: 67.8 %
NITRITE URINE: NEGATIVE
PH URINE: 6
PLATELET # BLD AUTO: 179 K/UL
POTASSIUM SERPL-SCNC: 4.8 MMOL/L
PROT SERPL-MCNC: 7.1 G/DL
PROTEIN URINE: NEGATIVE
RBC # BLD: 4.65 M/UL
RBC # FLD: 14.4 %
RED BLOOD CELLS URINE: 1 /HPF
SODIUM SERPL-SCNC: 145 MMOL/L
SPECIFIC GRAVITY URINE: 1.02
SQUAMOUS EPITHELIAL CELLS: 3 /HPF
TRIGL SERPL-MCNC: 140 MG/DL
TSH SERPL-ACNC: 3.87 UIU/ML
UROBILINOGEN URINE: NORMAL
WBC # FLD AUTO: 7.85 K/UL
WHITE BLOOD CELLS URINE: 1 /HPF

## 2019-08-28 ENCOUNTER — RX RENEWAL (OUTPATIENT)
Age: 84
End: 2019-08-28

## 2019-09-03 ENCOUNTER — RX RENEWAL (OUTPATIENT)
Age: 84
End: 2019-09-03

## 2019-09-20 ENCOUNTER — APPOINTMENT (OUTPATIENT)
Dept: FAMILY MEDICINE | Facility: CLINIC | Age: 84
End: 2019-09-20
Payer: MEDICARE

## 2019-09-20 ENCOUNTER — MED ADMIN CHARGE (OUTPATIENT)
Age: 84
End: 2019-09-20

## 2019-09-20 VITALS
BODY MASS INDEX: 21.26 KG/M2 | HEART RATE: 69 BPM | HEIGHT: 63 IN | TEMPERATURE: 97.5 F | DIASTOLIC BLOOD PRESSURE: 83 MMHG | SYSTOLIC BLOOD PRESSURE: 153 MMHG | OXYGEN SATURATION: 97 % | WEIGHT: 120 LBS

## 2019-09-20 DIAGNOSIS — E55.9 VITAMIN D DEFICIENCY, UNSPECIFIED: ICD-10-CM

## 2019-09-20 DIAGNOSIS — R41.3 OTHER AMNESIA: ICD-10-CM

## 2019-09-20 DIAGNOSIS — Z23 ENCOUNTER FOR IMMUNIZATION: ICD-10-CM

## 2019-09-20 DIAGNOSIS — I10 ESSENTIAL (PRIMARY) HYPERTENSION: ICD-10-CM

## 2019-09-20 PROCEDURE — 99213 OFFICE O/P EST LOW 20 MIN: CPT | Mod: 25

## 2019-09-20 PROCEDURE — 90662 IIV NO PRSV INCREASED AG IM: CPT

## 2019-09-20 PROCEDURE — G0008: CPT

## 2019-09-20 RX ORDER — TRIAMCINOLONE ACETONIDE 5 MG/G
0.5 CREAM TOPICAL
Qty: 15 | Refills: 0 | Status: COMPLETED | COMMUNITY
Start: 2017-05-16 | End: 2019-09-20

## 2019-09-20 NOTE — ASSESSMENT
[FreeTextEntry1] : This is an 86-year-old female originally from [facility] with past medical history of hypercholesterolemia, hypertension, insomnia, osteoporosis, breast CA s/p Right breast Mastectomy 10 yrs ago, memory difficulties, right bundle branch block, vitamin D deficiency, anxiety, presenting to the office for flu vaccine and medication refills.\par \par Cardiovascular: History of hyperlipidemia, hypertension, right bundle branch block.\par -Patient's blood pressure currently well-controlled.\par -Continue pravastatin 20 mg at bedtime\par -Continue current diet and exercise as tolerated.\par \par Psych/neuro: History anxiety, memory difficulties.\par -Continue alprazolam 0.25 mg t.i.d. p.r.n., I stop reviewed.\par -Continue Namenda 10 mg daily, continue Seroquel 25 mg twice a day, both renewed today.\par \par Rheumatology: History of osteoporosis, vitamin D insufficiency.\par -Continue alendronate 70 mg once a week.\par -Patient on vitamin D supplementation, we will recheck levels today and dose medication accordingly.\par \par Health care maintenance:\par -Flu vaccine administered.\par -Last mammogram in October 2017, Bi-rads 2\par -Last PAP smear last year, as per GYN no need for further examinations.\par -Last Bone Density 2016 found with Osteoporosis.\par -Pt states had colonoscopy many years ago, found with no abnormalities.

## 2019-09-20 NOTE — HEALTH RISK ASSESSMENT
[No] : In the past 12 months have you used drugs other than those required for medical reasons? No [No falls in past year] : Patient reported no falls in the past year [0] : 2) Feeling down, depressed, or hopeless: Not at all (0) [] : No [de-identified] : none [Audit-CScore] : 0 [[id number]] : 0 [de-identified] : regular

## 2019-09-20 NOTE — COUNSELING
[Fall prevention counseling provided] : Fall prevention counseling provided [Adequate lighting] : Adequate lighting [Use proper foot wear] : Use proper foot wear [No throw rugs] : No throw rugs [Use recommended devices] : Use recommended devices [AUDIT-C Screening administered and reviewed] : AUDIT-C Screening administered and reviewed [Weight management counseling provided] : Weight management [Healthy eating counseling provided] : healthy eating [Activity counseling provided] : activity [Sleep ___ hours/day] : Sleep [unfilled] hours/day [Behavioral health counseling provided] : behavioral health  [Good understanding] : Patient has a good understanding of lifestyle changes and the steps needed to achieve self management goals [None] : None

## 2019-09-20 NOTE — HISTORY OF PRESENT ILLNESS
[de-identified] : This is an 86-year-old female [place] [facility] with past medical history of hypercholesterolemia, hypertension, insomnia, osteoporosis, memory difficulties, right bundle branch block, vitamin D deficiency, anxiety, presenting to the office for flu vaccine and medication refills. [FreeTextEntry1] : flu vaccine and medication refills

## 2019-09-28 ENCOUNTER — MEDICATION RENEWAL (OUTPATIENT)
Age: 84
End: 2019-09-28

## 2019-11-25 ENCOUNTER — MEDICATION RENEWAL (OUTPATIENT)
Age: 84
End: 2019-11-25

## 2019-12-13 ENCOUNTER — RX RENEWAL (OUTPATIENT)
Age: 84
End: 2019-12-13

## 2019-12-21 ENCOUNTER — MEDICATION RENEWAL (OUTPATIENT)
Age: 84
End: 2019-12-21

## 2019-12-22 ENCOUNTER — RX RENEWAL (OUTPATIENT)
Age: 84
End: 2019-12-22

## 2019-12-24 ENCOUNTER — RX RENEWAL (OUTPATIENT)
Age: 84
End: 2019-12-24

## 2020-02-25 ENCOUNTER — RX RENEWAL (OUTPATIENT)
Age: 85
End: 2020-02-25

## 2020-03-09 ENCOUNTER — RX RENEWAL (OUTPATIENT)
Age: 85
End: 2020-03-09

## 2020-04-14 ENCOUNTER — RX RENEWAL (OUTPATIENT)
Age: 85
End: 2020-04-14

## 2020-04-27 ENCOUNTER — RX RENEWAL (OUTPATIENT)
Age: 85
End: 2020-04-27

## 2020-05-07 ENCOUNTER — RX RENEWAL (OUTPATIENT)
Age: 85
End: 2020-05-07

## 2020-06-22 ENCOUNTER — RX RENEWAL (OUTPATIENT)
Age: 85
End: 2020-06-22

## 2020-06-23 ENCOUNTER — RX RENEWAL (OUTPATIENT)
Age: 85
End: 2020-06-23

## 2020-06-30 ENCOUNTER — APPOINTMENT (OUTPATIENT)
Dept: FAMILY MEDICINE | Facility: CLINIC | Age: 85
End: 2020-06-30

## 2020-07-10 ENCOUNTER — RX RENEWAL (OUTPATIENT)
Age: 85
End: 2020-07-10

## 2020-07-27 ENCOUNTER — EMERGENCY (EMERGENCY)
Facility: HOSPITAL | Age: 85
LOS: 1 days | Discharge: HOME | End: 2020-07-27
Attending: EMERGENCY MEDICINE
Payer: COMMERCIAL

## 2020-07-27 ENCOUNTER — TRANSCRIPTION ENCOUNTER (OUTPATIENT)
Age: 85
End: 2020-07-27

## 2020-07-27 ENCOUNTER — RX RENEWAL (OUTPATIENT)
Age: 85
End: 2020-07-27

## 2020-07-27 ENCOUNTER — APPOINTMENT (OUTPATIENT)
Dept: FAMILY MEDICINE | Facility: CLINIC | Age: 85
End: 2020-07-27

## 2020-07-27 VITALS
SYSTOLIC BLOOD PRESSURE: 150 MMHG | RESPIRATION RATE: 18 BRPM | HEIGHT: 62 IN | DIASTOLIC BLOOD PRESSURE: 72 MMHG | OXYGEN SATURATION: 97 % | HEART RATE: 67 BPM | TEMPERATURE: 98 F | WEIGHT: 130.07 LBS

## 2020-07-27 LAB
ALBUMIN SERPL ELPH-MCNC: 4.4 G/DL — SIGNIFICANT CHANGE UP (ref 3.3–5.2)
ALP SERPL-CCNC: 59 U/L — SIGNIFICANT CHANGE UP (ref 40–120)
ALT FLD-CCNC: 26 U/L — SIGNIFICANT CHANGE UP
ANION GAP SERPL CALC-SCNC: 13 MMOL/L — SIGNIFICANT CHANGE UP (ref 5–17)
APTT BLD: 39.2 SEC — HIGH (ref 27.5–35.5)
AST SERPL-CCNC: 35 U/L — HIGH
BILIRUB SERPL-MCNC: 0.5 MG/DL — SIGNIFICANT CHANGE UP (ref 0.4–2)
BLD GP AB SCN SERPL QL: SIGNIFICANT CHANGE UP
BUN SERPL-MCNC: 10 MG/DL — SIGNIFICANT CHANGE UP (ref 8–20)
CALCIUM SERPL-MCNC: 9.3 MG/DL — SIGNIFICANT CHANGE UP (ref 8.6–10.2)
CHLORIDE SERPL-SCNC: 100 MMOL/L — SIGNIFICANT CHANGE UP (ref 98–107)
CO2 SERPL-SCNC: 27 MMOL/L — SIGNIFICANT CHANGE UP (ref 22–29)
CREAT SERPL-MCNC: 0.49 MG/DL — LOW (ref 0.5–1.3)
GLUCOSE SERPL-MCNC: 96 MG/DL — SIGNIFICANT CHANGE UP (ref 70–99)
HCT VFR BLD CALC: 43.9 % — SIGNIFICANT CHANGE UP (ref 34.5–45)
HGB BLD-MCNC: 14.5 G/DL — SIGNIFICANT CHANGE UP (ref 11.5–15.5)
INR BLD: 1.16 RATIO — SIGNIFICANT CHANGE UP (ref 0.88–1.16)
MCHC RBC-ENTMCNC: 30.3 PG — SIGNIFICANT CHANGE UP (ref 27–34)
MCHC RBC-ENTMCNC: 33 GM/DL — SIGNIFICANT CHANGE UP (ref 32–36)
MCV RBC AUTO: 91.8 FL — SIGNIFICANT CHANGE UP (ref 80–100)
PLATELET # BLD AUTO: 227 K/UL — SIGNIFICANT CHANGE UP (ref 150–400)
POTASSIUM SERPL-MCNC: 4.3 MMOL/L — SIGNIFICANT CHANGE UP (ref 3.5–5.3)
POTASSIUM SERPL-SCNC: 4.3 MMOL/L — SIGNIFICANT CHANGE UP (ref 3.5–5.3)
PROT SERPL-MCNC: 7 G/DL — SIGNIFICANT CHANGE UP (ref 6.6–8.7)
PROTHROM AB SERPL-ACNC: 13.4 SEC — SIGNIFICANT CHANGE UP (ref 10.6–13.6)
RBC # BLD: 4.78 M/UL — SIGNIFICANT CHANGE UP (ref 3.8–5.2)
RBC # FLD: 13.8 % — SIGNIFICANT CHANGE UP (ref 10.3–14.5)
SODIUM SERPL-SCNC: 140 MMOL/L — SIGNIFICANT CHANGE UP (ref 135–145)
WBC # BLD: 10.61 K/UL — HIGH (ref 3.8–10.5)
WBC # FLD AUTO: 10.61 K/UL — HIGH (ref 3.8–10.5)

## 2020-07-27 PROCEDURE — 80053 COMPREHEN METABOLIC PANEL: CPT

## 2020-07-27 PROCEDURE — 86850 RBC ANTIBODY SCREEN: CPT

## 2020-07-27 PROCEDURE — 93971 EXTREMITY STUDY: CPT | Mod: 26,LT

## 2020-07-27 PROCEDURE — 71046 X-RAY EXAM CHEST 2 VIEWS: CPT

## 2020-07-27 PROCEDURE — 85730 THROMBOPLASTIN TIME PARTIAL: CPT

## 2020-07-27 PROCEDURE — 93010 ELECTROCARDIOGRAM REPORT: CPT

## 2020-07-27 PROCEDURE — 99284 EMERGENCY DEPT VISIT MOD MDM: CPT

## 2020-07-27 PROCEDURE — 99285 EMERGENCY DEPT VISIT HI MDM: CPT

## 2020-07-27 PROCEDURE — 36415 COLL VENOUS BLD VENIPUNCTURE: CPT

## 2020-07-27 PROCEDURE — 93971 EXTREMITY STUDY: CPT

## 2020-07-27 PROCEDURE — 93005 ELECTROCARDIOGRAM TRACING: CPT

## 2020-07-27 PROCEDURE — 86901 BLOOD TYPING SEROLOGIC RH(D): CPT

## 2020-07-27 PROCEDURE — 86900 BLOOD TYPING SEROLOGIC ABO: CPT

## 2020-07-27 PROCEDURE — 71046 X-RAY EXAM CHEST 2 VIEWS: CPT | Mod: 26

## 2020-07-27 PROCEDURE — 85027 COMPLETE CBC AUTOMATED: CPT

## 2020-07-27 PROCEDURE — 85610 PROTHROMBIN TIME: CPT

## 2020-07-27 RX ORDER — CEPHALEXIN 500 MG
500 CAPSULE ORAL ONCE
Refills: 0 | Status: COMPLETED | OUTPATIENT
Start: 2020-07-27 | End: 2020-07-27

## 2020-07-27 RX ORDER — CEPHALEXIN 500 MG
1 CAPSULE ORAL
Qty: 28 | Refills: 0
Start: 2020-07-27 | End: 2020-08-02

## 2020-07-27 RX ADMIN — Medication 500 MILLIGRAM(S): at 23:04

## 2020-07-27 NOTE — ED STATDOCS - NSFOLLOWUPINSTRUCTIONS_ED_ALL_ED_FT
- Prescription sent to pharmacy.  - Please call PCP tomorrow. Recommending repeat US in one week.   - Elevate extremity.  - Please call to schedule follow up appointment with your primary care physician within 24-48 hours.  - Please seek immediate medical attention for any new/worsening, signs/symptoms, or concerns, fevers, worsening swelling/redness.    Feel better!

## 2020-07-27 NOTE — ED STATDOCS - PHYSICAL EXAMINATION
Gen: Well appearing in NAD  Head: NC/AT  Neck: trachea midline  Resp:  No distress  Ext: no deformities, 2+ LE edema to left leg, calf is TTP, no warmth, and nml pulses.  Neuro:  A&O appears non focal. Nml sensation.   Skin:  Warm and dry as visualized  Psych:  Normal affect and mood Gen: Well appearing in NAD  Head: NC/AT  Neck: trachea midline  Resp:  No distress, CTAB  CV: RRR  Ext: no deformities, 2+ LE edema to left leg, calf is TTP, no warmth, minimal erythema, and nml pulses.  Neuro:  A&O appears non focal. Nml sensation.   Skin:  Warm and dry as visualized  Psych:  Normal affect and mood

## 2020-07-27 NOTE — ED STATDOCS - ATTENDING CONTRIBUTION TO CARE
Damien: I performed a face to face bedside interview with patient regarding history of present illness, review of symptoms and past medical history. I completed an independent physical exam and ordered tests/medications as needed.  I have discussed patient's plan of care with advanced care provider. The advanced care provider assisted in  executing the discussed plan. I was available for any questions or issues that may have arose during the execution of the plan of care.

## 2020-07-27 NOTE — ED STATDOCS - CARE PROVIDER_API CALL
GARRETT CALDERON  Internal Medicine  [address] [place] [address]  Phone: [phone]  Fax: [phone]  Follow Up Time:

## 2020-07-27 NOTE — ED STATDOCS - PROGRESS NOTE DETAILS
ANGEL Bean: Patient evaluated by intake physician. HPI/ROS/PE as noted above. Will follow up plan per intake physician and continue to assess patient.

## 2020-07-27 NOTE — ED STATDOCS - OBJECTIVE STATEMENT
87 y/o F pt with significant PMHx of dementia, HLD, HTN, and anxiety presents to the ED c/o worsening LE edema that began a week ago. Pt was prompted to come to the ED from urgent care. She states that her leg is also red but not warm. Pt is having SOB. Pt denies CP and fever. Pt denies hx of heart failure. Pt had a mastectomy. She does not usually fall. Pt denies bug bite before the onset of the sx. No further complaints at this time. 87 y/o F pt with significant PMHx of dementia, HLD, HTN, and anxiety presents to the ED c/o worsening L LE edema that began a week ago. Pt was prompted to come to the ED from urgent care. She states that her leg is also red but not warm. Pt is having SOB. Pt denies CP and fever. Pt denies hx of heart failure. Pt had a mastectomy. She does not usually fall. Pt denies bug bite before the onset of the sx. No further complaints at this time.

## 2020-07-27 NOTE — ED STATDOCS - CLINICAL SUMMARY MEDICAL DECISION MAKING FREE TEXT BOX
89 y/o F pt with left leg swelling and calf tenderness. Concerned for DVT. Plan for labs CXR, US, and reassess.

## 2020-07-28 ENCOUNTER — EMERGENCY (EMERGENCY)
Facility: HOSPITAL | Age: 85
LOS: 1 days | Discharge: HOME | End: 2020-07-28
Attending: EMERGENCY MEDICINE
Payer: COMMERCIAL

## 2020-07-28 VITALS
OXYGEN SATURATION: 96 % | RESPIRATION RATE: 16 BRPM | WEIGHT: 119.93 LBS | SYSTOLIC BLOOD PRESSURE: 132 MMHG | HEIGHT: 62 IN | DIASTOLIC BLOOD PRESSURE: 66 MMHG | TEMPERATURE: 98 F | HEART RATE: 68 BPM

## 2020-07-28 LAB
ALBUMIN SERPL ELPH-MCNC: 4.3 G/DL — SIGNIFICANT CHANGE UP (ref 3.3–5.2)
ALP SERPL-CCNC: 53 U/L — SIGNIFICANT CHANGE UP (ref 40–120)
ALT FLD-CCNC: 25 U/L — SIGNIFICANT CHANGE UP
ANION GAP SERPL CALC-SCNC: 16 MMOL/L — SIGNIFICANT CHANGE UP (ref 5–17)
APTT BLD: 37.3 SEC — HIGH (ref 27.5–35.5)
AST SERPL-CCNC: 35 U/L — HIGH
BASOPHILS # BLD AUTO: 0.04 K/UL — SIGNIFICANT CHANGE UP (ref 0–0.2)
BASOPHILS NFR BLD AUTO: 0.4 % — SIGNIFICANT CHANGE UP (ref 0–2)
BILIRUB SERPL-MCNC: 0.7 MG/DL — SIGNIFICANT CHANGE UP (ref 0.4–2)
BUN SERPL-MCNC: 10 MG/DL — SIGNIFICANT CHANGE UP (ref 8–20)
CALCIUM SERPL-MCNC: 9 MG/DL — SIGNIFICANT CHANGE UP (ref 8.6–10.2)
CHLORIDE SERPL-SCNC: 97 MMOL/L — LOW (ref 98–107)
CO2 SERPL-SCNC: 23 MMOL/L — SIGNIFICANT CHANGE UP (ref 22–29)
CREAT SERPL-MCNC: 0.49 MG/DL — LOW (ref 0.5–1.3)
CRP SERPL-MCNC: <0.4 MG/DL — SIGNIFICANT CHANGE UP (ref 0–0.4)
D DIMER BLD IA.RAPID-MCNC: 250 NG/ML DDU — HIGH
EOSINOPHIL # BLD AUTO: 0.04 K/UL — SIGNIFICANT CHANGE UP (ref 0–0.5)
EOSINOPHIL NFR BLD AUTO: 0.4 % — SIGNIFICANT CHANGE UP (ref 0–6)
FERRITIN SERPL-MCNC: 273 NG/ML — HIGH (ref 15–150)
GLUCOSE SERPL-MCNC: 85 MG/DL — SIGNIFICANT CHANGE UP (ref 70–99)
HCT VFR BLD CALC: 43.1 % — SIGNIFICANT CHANGE UP (ref 34.5–45)
HGB BLD-MCNC: 14.1 G/DL — SIGNIFICANT CHANGE UP (ref 11.5–15.5)
IMM GRANULOCYTES NFR BLD AUTO: 0.3 % — SIGNIFICANT CHANGE UP (ref 0–1.5)
INR BLD: 1.18 RATIO — HIGH (ref 0.88–1.16)
LYMPHOCYTES # BLD AUTO: 1.36 K/UL — SIGNIFICANT CHANGE UP (ref 1–3.3)
LYMPHOCYTES # BLD AUTO: 14.9 % — SIGNIFICANT CHANGE UP (ref 13–44)
MCHC RBC-ENTMCNC: 30.3 PG — SIGNIFICANT CHANGE UP (ref 27–34)
MCHC RBC-ENTMCNC: 32.7 GM/DL — SIGNIFICANT CHANGE UP (ref 32–36)
MCV RBC AUTO: 92.7 FL — SIGNIFICANT CHANGE UP (ref 80–100)
MONOCYTES # BLD AUTO: 0.57 K/UL — SIGNIFICANT CHANGE UP (ref 0–0.9)
MONOCYTES NFR BLD AUTO: 6.2 % — SIGNIFICANT CHANGE UP (ref 2–14)
NEUTROPHILS # BLD AUTO: 7.11 K/UL — SIGNIFICANT CHANGE UP (ref 1.8–7.4)
NEUTROPHILS NFR BLD AUTO: 77.8 % — HIGH (ref 43–77)
NT-PROBNP SERPL-SCNC: 502 PG/ML — HIGH (ref 0–300)
PLATELET # BLD AUTO: 217 K/UL — SIGNIFICANT CHANGE UP (ref 150–400)
POTASSIUM SERPL-MCNC: 3.8 MMOL/L — SIGNIFICANT CHANGE UP (ref 3.5–5.3)
POTASSIUM SERPL-SCNC: 3.8 MMOL/L — SIGNIFICANT CHANGE UP (ref 3.5–5.3)
PROCALCITONIN SERPL-MCNC: 0.05 NG/ML — SIGNIFICANT CHANGE UP (ref 0.02–0.1)
PROT SERPL-MCNC: 7.1 G/DL — SIGNIFICANT CHANGE UP (ref 6.6–8.7)
PROTHROM AB SERPL-ACNC: 13.6 SEC — SIGNIFICANT CHANGE UP (ref 10.6–13.6)
RBC # BLD: 4.65 M/UL — SIGNIFICANT CHANGE UP (ref 3.8–5.2)
RBC # FLD: 13.8 % — SIGNIFICANT CHANGE UP (ref 10.3–14.5)
SODIUM SERPL-SCNC: 136 MMOL/L — SIGNIFICANT CHANGE UP (ref 135–145)
TROPONIN T SERPL-MCNC: <0.01 NG/ML — SIGNIFICANT CHANGE UP (ref 0–0.06)
WBC # BLD: 9.15 K/UL — SIGNIFICANT CHANGE UP (ref 3.8–10.5)
WBC # FLD AUTO: 9.15 K/UL — SIGNIFICANT CHANGE UP (ref 3.8–10.5)

## 2020-07-28 PROCEDURE — 82728 ASSAY OF FERRITIN: CPT

## 2020-07-28 PROCEDURE — 86140 C-REACTIVE PROTEIN: CPT

## 2020-07-28 PROCEDURE — 80053 COMPREHEN METABOLIC PANEL: CPT

## 2020-07-28 PROCEDURE — 85379 FIBRIN DEGRADATION QUANT: CPT

## 2020-07-28 PROCEDURE — U0003: CPT

## 2020-07-28 PROCEDURE — 84145 PROCALCITONIN (PCT): CPT

## 2020-07-28 PROCEDURE — 99284 EMERGENCY DEPT VISIT MOD MDM: CPT | Mod: 25

## 2020-07-28 PROCEDURE — 85027 COMPLETE CBC AUTOMATED: CPT

## 2020-07-28 PROCEDURE — 85730 THROMBOPLASTIN TIME PARTIAL: CPT

## 2020-07-28 PROCEDURE — 83880 ASSAY OF NATRIURETIC PEPTIDE: CPT

## 2020-07-28 PROCEDURE — 94640 AIRWAY INHALATION TREATMENT: CPT

## 2020-07-28 PROCEDURE — 99285 EMERGENCY DEPT VISIT HI MDM: CPT

## 2020-07-28 PROCEDURE — 71275 CT ANGIOGRAPHY CHEST: CPT | Mod: 26

## 2020-07-28 PROCEDURE — 84484 ASSAY OF TROPONIN QUANT: CPT

## 2020-07-28 PROCEDURE — 36415 COLL VENOUS BLD VENIPUNCTURE: CPT

## 2020-07-28 PROCEDURE — 71275 CT ANGIOGRAPHY CHEST: CPT

## 2020-07-28 PROCEDURE — 85610 PROTHROMBIN TIME: CPT

## 2020-07-28 RX ORDER — METOCLOPRAMIDE HCL 10 MG
10 TABLET ORAL ONCE
Refills: 0 | Status: DISCONTINUED | OUTPATIENT
Start: 2020-07-28 | End: 2020-07-28

## 2020-07-28 RX ORDER — ALBUTEROL 90 UG/1
2 AEROSOL, METERED ORAL ONCE
Refills: 0 | Status: COMPLETED | OUTPATIENT
Start: 2020-07-28 | End: 2020-07-28

## 2020-07-28 RX ADMIN — ALBUTEROL 2 PUFF(S): 90 AEROSOL, METERED ORAL at 13:22

## 2020-07-28 NOTE — ED PROVIDER NOTE - OBJECTIVE STATEMENT
87 y/o F pt with significant PMHx of dementia, HLD, HTN, and anxiety presents to the ED c/o worsening L LE edema that began a week ago. Pt was prompted to come back after ED visit last night due to radiology recommending CT scan based on lower lobe opacities on Cxray. She states that her left leg is swollen and sore. Pt is having SOB. Pt denies CP, fever or bleeding. Pt denies hx of heart failure. Pt had a mastectomy due to cancer years ago.

## 2020-07-28 NOTE — ED PROVIDER NOTE - ATTENDING CONTRIBUTION TO CARE
I personally saw the patient with the resident, and completed the key components of the history and physical exam. I then discussed the management plan with the resident.      87 yo female pmh dementia  breast ca  comes to ed recalled for chest xray noted for mulitple infiltrates with recommendations for ct scan; pt seen flro left lower extremity edema with negative doppler;   pe thin female in nad heent ncat  , neck supple cor s1 s2 lungs clear abd soft nontender; neuro  a alert oriented to person; 5/5 motor , sensation intact

## 2020-07-28 NOTE — ED ADULT NURSE NOTE - CHPI ED NUR SYMPTOMS NEG
no headache/no body aches/no diaphoresis/no hemoptysis/no chills/no fever/no chest pain/no cough/no wheezing

## 2020-07-28 NOTE — ED PROVIDER NOTE - NS ED ROS FT
General: Denies fever, chills  HEENT: Denies sensory changes, sore throat  Neck: Denies neck pain or difficulty with movement  Resp: +SOB, denies hemoptysis  Cardiovascular: Denies CP, LOC  GI: Denies abdominal pain, blood in stool or vomit  : Denies dysuria, hematuria   MSK: Denies back or limb pain  Neuro: Denies HA, motor deficits

## 2020-07-28 NOTE — ED PROVIDER NOTE - PATIENT PORTAL LINK FT
You can access the FollowMyHealth Patient Portal offered by [facility] by registering at the following website: [email or web address]. By joining [employer]’s FollowMyHealth portal, you will also be able to view your health information using other applications (apps) compatible with our system.

## 2020-07-28 NOTE — ED POST DISCHARGE NOTE - DETAILS
home phone number contacted and off line. used number for daughter as listed and spoke with daughter lj and advised on need for further imaging

## 2020-07-28 NOTE — ED PROVIDER NOTE - PHYSICAL EXAMINATION
General: NAD, well appearing  HEENT: Normocephalic, EOM intact  Neck: No apparent stiffness or JVD  Pulm: Chest wall symmetric and nontender, lungs clear to ascultation   Cardiac: Regular rate and rhythm  Abdomen: Nontender and nondistended  Skin: Skin in warm, dry and intact without rashes or lesions.  Neuro: No apparent motor or sensory deficits  Psych: Alert, oriented, and cooperative  Lymph: 3+ edema in left leg, pulses present

## 2020-07-28 NOTE — ED PROVIDER NOTE - CLINICAL SUMMARY MEDICAL DECISION MAKING FREE TEXT BOX
87 y/o F pt with significant PMHx of dementia, HLD, HTN, and anxiety presents to the ED c/o worsening L LE edema that began a week ago. Pt was prompted to come back after ED visit last night due to radiology recommending CT scan based on lower lobe opacities on Cxray. Edema is still present, ultrasound last night was neg for DVT.  - CT angio +labs with further eval afterwards.  -

## 2020-07-28 NOTE — ED PROVIDER NOTE - NSFOLLOWUPINSTRUCTIONS_ED_ALL_ED_FT
Stop Keflex and switch to doxycycline for better coverage.  Follow up with your primary care doctor to review results and discuss further plan of care within 5 days.

## 2020-07-28 NOTE — ED STATDOCS - PROGRESS NOTE DETAILS
88 year old female with PMh HTn, HLD, dementia, anxiety presents for abnromal cxr. Pt state sshe has had 1 week of LLE swelling. Was seen here yesterday, nomral doppler, doischarged. Then am radiology read with multiple b/l opacities. Upon interview pt also reports several days of progressive dyspnea. Denies chets pain, fever, cough.  Gen: NAD, well appearing CV: RRR, unilater 2-3+ pitting edema to the LLE, Pul: CTA b/l Abd: Soft, non-distended, non-tender Neuro: no focal deficits  Pt with SOB, multiple b/l opacities, unclear if multifocal pna vs covid. Upgraded to main

## 2020-07-28 NOTE — ED ADULT TRIAGE NOTE - CHIEF COMPLAINT QUOTE
"They saw fluid in her lungs on an xray. they wanted her to come back for a cat scan. she got the xray because she had fluid in her legs"

## 2020-07-28 NOTE — CHART NOTE - NSCHARTNOTEFT_GEN_A_CORE
SWNote: p/c from pt's EDMD(Dr Cartre) to inform worker that pt is discharge and family requesting to speak with SW. Worker met with pt and her son at bedside, pt states he understands pt's Dx, interested in guidance to obtain [facility] . Worker provided written info, community advocates names and list of needed documents for him to start putting together in order to start the process, verbalized understanding/will follow up accordingly. No other SW concerns reported at  this moment.

## 2020-07-28 NOTE — ED PROVIDER NOTE - PROGRESS NOTE DETAILS
Patient feeling improved and looks better overall although leg edema still present.  CT demonstrated Mucous impacted bronchi throughout the inferior aspect of the right middle lobe, Cavitary 8 mm left lower lobe nodule; differential includes infection or neoplasm.  Patient's outpatient Keflex prescription will be switched to Doxycycline for better pulmonary coverage and plan for close outpatient followup with PCM this week.  This was explained to patient with her adult son present who expressed understanding to treatment, follow up, and real possibility for malignancy.

## 2020-07-29 LAB — SARS-COV-2 RNA SPEC QL NAA+PROBE: SIGNIFICANT CHANGE UP

## 2020-07-31 ENCOUNTER — APPOINTMENT (OUTPATIENT)
Dept: FAMILY MEDICINE | Facility: CLINIC | Age: 85
End: 2020-07-31

## 2020-08-19 PROBLEM — E78.5 HYPERLIPIDEMIA, UNSPECIFIED: Chronic | Status: ACTIVE | Noted: 2020-07-28

## 2020-08-19 PROBLEM — F03.90 UNSPECIFIED DEMENTIA, UNSPECIFIED SEVERITY, WITHOUT BEHAVIORAL DISTURBANCE, PSYCHOTIC DISTURBANCE, MOOD DISTURBANCE, AND ANXIETY: Chronic | Status: ACTIVE | Noted: 2020-07-28

## 2020-08-19 PROBLEM — I10 ESSENTIAL (PRIMARY) HYPERTENSION: Chronic | Status: ACTIVE | Noted: 2020-07-28

## 2020-08-19 PROBLEM — F41.9 ANXIETY DISORDER, UNSPECIFIED: Chronic | Status: ACTIVE | Noted: 2020-07-28

## 2020-08-28 ENCOUNTER — APPOINTMENT (OUTPATIENT)
Dept: PULMONOLOGY | Facility: CLINIC | Age: 85
End: 2020-08-28
Payer: MEDICARE

## 2020-08-28 VITALS
WEIGHT: 99 LBS | DIASTOLIC BLOOD PRESSURE: 66 MMHG | HEART RATE: 65 BPM | HEIGHT: 62 IN | SYSTOLIC BLOOD PRESSURE: 154 MMHG | BODY MASS INDEX: 18.22 KG/M2 | OXYGEN SATURATION: 97 %

## 2020-08-28 DIAGNOSIS — Z86.79 PERSONAL HISTORY OF OTHER DISEASES OF THE CIRCULATORY SYSTEM: ICD-10-CM

## 2020-08-28 DIAGNOSIS — I45.10 UNSPECIFIED RIGHT BUNDLE-BRANCH BLOCK: ICD-10-CM

## 2020-08-28 PROCEDURE — 99204 OFFICE O/P NEW MOD 45 MIN: CPT

## 2020-08-28 RX ORDER — METOPROLOL TARTRATE 25 MG/1
25 TABLET, FILM COATED ORAL
Refills: 0 | Status: ACTIVE | COMMUNITY
Start: 2020-08-28

## 2020-08-28 RX ORDER — TRIAMCINOLONE ACETONIDE 55 UG/1
55 SOLUTION/ DROPS OPHTHALMIC
Qty: 1 | Refills: 0 | Status: DISCONTINUED | COMMUNITY
Start: 2017-02-22 | End: 2020-08-28

## 2020-08-28 NOTE — PHYSICAL EXAM
[No Acute Distress] : no acute distress [Normal Oropharynx] : normal oropharynx [Normal Appearance] : normal appearance [Normal Rate/Rhythm] : normal rate/rhythm [No Neck Mass] : no neck mass [No Resp Distress] : no resp distress [No Murmurs] : no murmurs [Normal S1, S2] : normal s1, s2 [Clear to Auscultation Bilaterally] : clear to auscultation bilaterally [Benign] : benign [No Abnormalities] : no abnormalities [No Clubbing] : no clubbing [Normal Gait] : normal gait [Normal Color/ Pigmentation] : normal color/ pigmentation [No Cyanosis] : no cyanosis [Normal Affect] : normal affect [No Focal Deficits] : no focal deficits

## 2020-08-31 NOTE — ASSESSMENT
[FreeTextEntry1] : \par Nothing to suggest COPD, fibrosis or malignancy\par Not in a lung cancer risk group\par Mild aspiration likely with trivial findings on CT\par

## 2020-08-31 NOTE — HISTORY OF PRESENT ILLNESS
[TextBox_4] : Referred after 7/28/20 visit to [facility] ED \par COVID PCR neg\par CTA with no PE.  Mucoid impacted airways - RML: Cavitary nodule LLL - 8mm.  RML atelectatic change\par Dementia and LLE edema noted\par Released and treated with Doxycycline\par \par Seen now for dyspnea and insomnia\par Never a smoker\par Advanced age\par \par Per daughter - issue is anxiety not dyspnea\par Some drug seeking for anxiety\par Insomnia better on melatonin\par

## 2021-04-23 ENCOUNTER — RX RENEWAL (OUTPATIENT)
Age: 86
End: 2021-04-23

## 2021-05-20 ENCOUNTER — APPOINTMENT (OUTPATIENT)
Dept: PULMONOLOGY | Facility: CLINIC | Age: 86
End: 2021-05-20
Payer: MEDICARE

## 2021-05-20 VITALS
HEART RATE: 82 BPM | BODY MASS INDEX: 15 KG/M2 | DIASTOLIC BLOOD PRESSURE: 64 MMHG | SYSTOLIC BLOOD PRESSURE: 140 MMHG | OXYGEN SATURATION: 97 % | WEIGHT: 82 LBS

## 2021-05-20 DIAGNOSIS — J30.9 ALLERGIC RHINITIS, UNSPECIFIED: ICD-10-CM

## 2021-05-20 DIAGNOSIS — R91.8 OTHER NONSPECIFIC ABNORMAL FINDING OF LUNG FIELD: ICD-10-CM

## 2021-05-20 DIAGNOSIS — R06.00 DYSPNEA, UNSPECIFIED: ICD-10-CM

## 2021-05-20 DIAGNOSIS — F41.9 ANXIETY DISORDER, UNSPECIFIED: ICD-10-CM

## 2021-05-20 DIAGNOSIS — G47.00 INSOMNIA, UNSPECIFIED: ICD-10-CM

## 2021-05-20 DIAGNOSIS — H93.19 TINNITUS, UNSPECIFIED EAR: ICD-10-CM

## 2021-05-20 PROCEDURE — 99214 OFFICE O/P EST MOD 30 MIN: CPT

## 2021-05-20 RX ORDER — ALPRAZOLAM 0.25 MG/1
0.25 TABLET ORAL 3 TIMES DAILY
Qty: 90 | Refills: 1 | Status: ACTIVE | COMMUNITY
Start: 2017-03-13

## 2021-05-20 RX ORDER — MEMANTINE HYDROCHLORIDE 10 MG/1
10 TABLET, FILM COATED ORAL
Qty: 90 | Refills: 3 | Status: DISCONTINUED | COMMUNITY
Start: 2017-11-13 | End: 2021-05-20

## 2021-05-20 RX ORDER — LEVOCETIRIZINE DIHYDROCHLORIDE 5 MG/1
5 TABLET ORAL
Qty: 30 | Refills: 1 | Status: DISCONTINUED | COMMUNITY
Start: 2019-03-26 | End: 2021-05-20

## 2021-05-20 RX ORDER — QUETIAPINE FUMARATE 25 MG/1
25 TABLET ORAL
Qty: 180 | Refills: 0 | Status: DISCONTINUED | COMMUNITY
Start: 2017-11-13 | End: 2021-05-20

## 2021-05-20 NOTE — ASSESSMENT
[FreeTextEntry1] : \par Nothing to suggest COPD, fibrosis or malignancy\par Not in a lung cancer risk group\par Mild aspiration likely with trivial findings on CT\par Tinnitus and Rhinitis \par

## 2021-05-20 NOTE — PHYSICAL EXAM
[No Acute Distress] : no acute distress [Normal Oropharynx] : normal oropharynx [Normal Appearance] : normal appearance [No Neck Mass] : no neck mass [Normal Rate/Rhythm] : normal rate/rhythm [Normal S1, S2] : normal s1, s2 [No Murmurs] : no murmurs [No Resp Distress] : no resp distress [Clear to Auscultation Bilaterally] : clear to auscultation bilaterally [No Abnormalities] : no abnormalities [Benign] : benign [Normal Gait] : normal gait [No Clubbing] : no clubbing [No Cyanosis] : no cyanosis [Normal Color/ Pigmentation] : normal color/ pigmentation [No Focal Deficits] : no focal deficits [Normal Affect] : normal affect

## 2021-05-20 NOTE — HISTORY OF PRESENT ILLNESS
[TextBox_4] : Referred after 7/28/20 visit to [facility] ED \par COVID PCR neg\par CTA with no PE.  Mucoid impacted airways - RML: Cavitary nodule LLL - 8mm.  RML atelectatic change\par Dementia and LLE edema noted\par Released and treated with Doxycycline\par \par Seen now for dyspnea and insomnia\par Never a smoker\par Advanced age\par \par Per daughter - issue is anxiety not dyspnea\par Some drug seeking for anxiety\par Insomnia better on melatonin\par \par 5/20/21\par Pleasant, elderly, demented 89 year-old came alone\par C/O nasal congestion and Tinnitus \par

## 2021-06-14 ENCOUNTER — RX CHANGE (OUTPATIENT)
Age: 86
End: 2021-06-14

## 2021-09-07 ENCOUNTER — RX RENEWAL (OUTPATIENT)
Age: 86
End: 2021-09-07

## 2021-12-08 RX ORDER — PROTRIPTYLINE HYDROCHLORIDE 10 MG/1
10 TABLET, FILM COATED ORAL AT BEDTIME
Qty: 90 | Refills: 0 | Status: ACTIVE | COMMUNITY
Start: 2021-05-20 | End: 1900-01-01

## 2022-04-28 ENCOUNTER — RX CHANGE (OUTPATIENT)
Age: 87
End: 2022-04-28

## 2022-04-29 ENCOUNTER — RX CHANGE (OUTPATIENT)
Age: 87
End: 2022-04-29

## 2022-06-30 ENCOUNTER — RX RENEWAL (OUTPATIENT)
Age: 87
End: 2022-06-30

## 2022-07-20 ENCOUNTER — EMERGENCY (EMERGENCY)
Facility: HOSPITAL | Age: 87
LOS: 1 days | Discharge: HOME | End: 2022-07-20
Attending: EMERGENCY MEDICINE
Payer: COMMERCIAL

## 2022-07-20 ENCOUNTER — INPATIENT (INPATIENT)
Facility: HOSPITAL | Age: 87
LOS: 7 days | Discharge: HOSPICE | DRG: 871 | End: 2022-07-28
Attending: STUDENT IN AN ORGANIZED HEALTH CARE EDUCATION/TRAINING PROGRAM | Admitting: FAMILY MEDICINE
Payer: COMMERCIAL

## 2022-07-20 VITALS
RESPIRATION RATE: 20 BRPM | HEART RATE: 100 BPM | SYSTOLIC BLOOD PRESSURE: 100 MMHG | OXYGEN SATURATION: 99 % | WEIGHT: 154.1 LBS | HEIGHT: 62 IN | TEMPERATURE: 98 F | DIASTOLIC BLOOD PRESSURE: 65 MMHG

## 2022-07-20 VITALS
RESPIRATION RATE: 16 BRPM | OXYGEN SATURATION: 95 % | TEMPERATURE: 99 F | SYSTOLIC BLOOD PRESSURE: 166 MMHG | HEART RATE: 119 BPM | HEIGHT: 62 IN | DIASTOLIC BLOOD PRESSURE: 98 MMHG

## 2022-07-20 DIAGNOSIS — A41.9 SEPSIS, UNSPECIFIED ORGANISM: ICD-10-CM

## 2022-07-20 DIAGNOSIS — R09.02 HYPOXEMIA: ICD-10-CM

## 2022-07-20 DIAGNOSIS — C34.90 MALIGNANT NEOPLASM OF UNSPECIFIED PART OF UNSPECIFIED BRONCHUS OR LUNG: ICD-10-CM

## 2022-07-20 DIAGNOSIS — Z90.10 ACQUIRED ABSENCE OF UNSPECIFIED BREAST AND NIPPLE: Chronic | ICD-10-CM

## 2022-07-20 DIAGNOSIS — I26.99 OTHER PULMONARY EMBOLISM WITHOUT ACUTE COR PULMONALE: ICD-10-CM

## 2022-07-20 DIAGNOSIS — I21.4 NON-ST ELEVATION (NSTEMI) MYOCARDIAL INFARCTION: ICD-10-CM

## 2022-07-20 LAB
ALBUMIN SERPL ELPH-MCNC: 3.9 G/DL — SIGNIFICANT CHANGE UP (ref 3.3–5.2)
ALP SERPL-CCNC: 66 U/L — SIGNIFICANT CHANGE UP (ref 40–120)
ALT FLD-CCNC: 48 U/L — HIGH
ANION GAP SERPL CALC-SCNC: 16 MMOL/L — SIGNIFICANT CHANGE UP (ref 5–17)
APTT BLD: 31.2 SEC — SIGNIFICANT CHANGE UP (ref 27.5–35.5)
AST SERPL-CCNC: 98 U/L — HIGH
BASE EXCESS BLDV CALC-SCNC: 7.1 MMOL/L — HIGH (ref -2–3)
BASOPHILS # BLD AUTO: 0 K/UL — SIGNIFICANT CHANGE UP (ref 0–0.2)
BASOPHILS NFR BLD AUTO: 0 % — SIGNIFICANT CHANGE UP (ref 0–2)
BILIRUB SERPL-MCNC: 0.8 MG/DL — SIGNIFICANT CHANGE UP (ref 0.4–2)
BUN SERPL-MCNC: 28.2 MG/DL — HIGH (ref 8–20)
CA-I SERPL-SCNC: 1.04 MMOL/L — LOW (ref 1.15–1.33)
CALCIUM SERPL-MCNC: 9.3 MG/DL — SIGNIFICANT CHANGE UP (ref 8.6–10.2)
CHLORIDE BLDV-SCNC: 91 MMOL/L — LOW (ref 98–107)
CHLORIDE SERPL-SCNC: 89 MMOL/L — LOW (ref 98–107)
CK MB CFR SERPL CALC: 12.4 NG/ML — HIGH (ref 0–6.7)
CK SERPL-CCNC: 657 U/L — HIGH (ref 25–170)
CO2 SERPL-SCNC: 27 MMOL/L — SIGNIFICANT CHANGE UP (ref 22–29)
CREAT SERPL-MCNC: 0.99 MG/DL — SIGNIFICANT CHANGE UP (ref 0.5–1.3)
EGFR: 54 ML/MIN/1.73M2 — LOW
EOSINOPHIL # BLD AUTO: 0 K/UL — SIGNIFICANT CHANGE UP (ref 0–0.5)
EOSINOPHIL NFR BLD AUTO: 0 % — SIGNIFICANT CHANGE UP (ref 0–6)
GAS PNL BLDV: 126 MMOL/L — LOW (ref 136–145)
GAS PNL BLDV: SIGNIFICANT CHANGE UP
GIANT PLATELETS BLD QL SMEAR: PRESENT — SIGNIFICANT CHANGE UP
GLUCOSE BLDV-MCNC: 188 MG/DL — HIGH (ref 70–99)
GLUCOSE SERPL-MCNC: 164 MG/DL — HIGH (ref 70–99)
HCO3 BLDV-SCNC: 33 MMOL/L — HIGH (ref 22–29)
HCT VFR BLD CALC: 45.4 % — HIGH (ref 34.5–45)
HCT VFR BLDA CALC: 46 % — SIGNIFICANT CHANGE UP
HGB BLD CALC-MCNC: 15.4 G/DL — SIGNIFICANT CHANGE UP (ref 11.7–16.1)
HGB BLD-MCNC: 15.1 G/DL — SIGNIFICANT CHANGE UP (ref 11.5–15.5)
INR BLD: 1.47 RATIO — HIGH (ref 0.88–1.16)
LACTATE BLDV-MCNC: 4.6 MMOL/L — CRITICAL HIGH (ref 0.5–2)
LACTATE SERPL-SCNC: 2.1 MMOL/L — HIGH (ref 0.5–2)
LACTATE SERPL-SCNC: 2.3 MMOL/L — HIGH (ref 0.5–2)
LYMPHOCYTES # BLD AUTO: 0.45 K/UL — LOW (ref 1–3.3)
LYMPHOCYTES # BLD AUTO: 1.8 % — LOW (ref 13–44)
MANUAL SMEAR VERIFICATION: SIGNIFICANT CHANGE UP
MCHC RBC-ENTMCNC: 30.4 PG — SIGNIFICANT CHANGE UP (ref 27–34)
MCHC RBC-ENTMCNC: 33.3 GM/DL — SIGNIFICANT CHANGE UP (ref 32–36)
MCV RBC AUTO: 91.3 FL — SIGNIFICANT CHANGE UP (ref 80–100)
MONOCYTES # BLD AUTO: 1.51 K/UL — HIGH (ref 0–0.9)
MONOCYTES NFR BLD AUTO: 6.1 % — SIGNIFICANT CHANGE UP (ref 2–14)
NEUTROPHILS # BLD AUTO: 22.62 K/UL — HIGH (ref 1.8–7.4)
NEUTROPHILS NFR BLD AUTO: 91.2 % — HIGH (ref 43–77)
PCO2 BLDV: 61 MMHG — HIGH (ref 39–42)
PH BLDV: 7.34 — SIGNIFICANT CHANGE UP (ref 7.32–7.43)
PLAT MORPH BLD: NORMAL — SIGNIFICANT CHANGE UP
PLATELET # BLD AUTO: 321 K/UL — SIGNIFICANT CHANGE UP (ref 150–400)
PO2 BLDV: 74 MMHG — HIGH (ref 25–45)
POTASSIUM BLDV-SCNC: 5.5 MMOL/L — HIGH (ref 3.5–5.1)
POTASSIUM SERPL-MCNC: 5.1 MMOL/L — SIGNIFICANT CHANGE UP (ref 3.5–5.3)
POTASSIUM SERPL-SCNC: 5.1 MMOL/L — SIGNIFICANT CHANGE UP (ref 3.5–5.3)
PROT SERPL-MCNC: 7.4 G/DL — SIGNIFICANT CHANGE UP (ref 6.6–8.7)
PROTHROM AB SERPL-ACNC: 17.1 SEC — HIGH (ref 10.5–13.4)
RAPID RVP RESULT: SIGNIFICANT CHANGE UP
RBC # BLD: 4.97 M/UL — SIGNIFICANT CHANGE UP (ref 3.8–5.2)
RBC # FLD: 14.1 % — SIGNIFICANT CHANGE UP (ref 10.3–14.5)
RBC BLD AUTO: NORMAL — SIGNIFICANT CHANGE UP
SAO2 % BLDV: 94.4 % — SIGNIFICANT CHANGE UP
SARS-COV-2 RNA SPEC QL NAA+PROBE: SIGNIFICANT CHANGE UP
SODIUM SERPL-SCNC: 132 MMOL/L — LOW (ref 135–145)
TROPONIN T SERPL-MCNC: 0.2 NG/ML — HIGH (ref 0–0.06)
VARIANT LYMPHS # BLD: 0.9 % — SIGNIFICANT CHANGE UP (ref 0–6)
WBC # BLD: 24.8 K/UL — HIGH (ref 3.8–10.5)
WBC # FLD AUTO: 24.8 K/UL — HIGH (ref 3.8–10.5)

## 2022-07-20 PROCEDURE — 99284 EMERGENCY DEPT VISIT MOD MDM: CPT

## 2022-07-20 PROCEDURE — 99497 ADVNCD CARE PLAN 30 MIN: CPT | Mod: 25

## 2022-07-20 PROCEDURE — 99223 1ST HOSP IP/OBS HIGH 75: CPT

## 2022-07-20 PROCEDURE — 93970 EXTREMITY STUDY: CPT | Mod: 26

## 2022-07-20 PROCEDURE — 99221 1ST HOSP IP/OBS SF/LOW 40: CPT

## 2022-07-20 PROCEDURE — 70450 CT HEAD/BRAIN W/O DYE: CPT | Mod: MA

## 2022-07-20 PROCEDURE — 71275 CT ANGIOGRAPHY CHEST: CPT | Mod: 26,MA

## 2022-07-20 PROCEDURE — 71045 X-RAY EXAM CHEST 1 VIEW: CPT | Mod: 26

## 2022-07-20 PROCEDURE — 74177 CT ABD & PELVIS W/CONTRAST: CPT | Mod: 26,MA

## 2022-07-20 PROCEDURE — 93010 ELECTROCARDIOGRAM REPORT: CPT

## 2022-07-20 PROCEDURE — 73130 X-RAY EXAM OF HAND: CPT

## 2022-07-20 PROCEDURE — 99291 CRITICAL CARE FIRST HOUR: CPT

## 2022-07-20 PROCEDURE — 70450 CT HEAD/BRAIN W/O DYE: CPT | Mod: 26,MA,77

## 2022-07-20 PROCEDURE — 74176 CT ABD & PELVIS W/O CONTRAST: CPT | Mod: 26,MA

## 2022-07-20 PROCEDURE — 70450 CT HEAD/BRAIN W/O DYE: CPT | Mod: 26,MA

## 2022-07-20 PROCEDURE — 71250 CT THORAX DX C-: CPT | Mod: 26,MA

## 2022-07-20 PROCEDURE — 73130 X-RAY EXAM OF HAND: CPT | Mod: 26,LT

## 2022-07-20 PROCEDURE — 72125 CT NECK SPINE W/O DYE: CPT | Mod: 26,MA

## 2022-07-20 RX ORDER — QUETIAPINE FUMARATE 200 MG/1
25 TABLET, FILM COATED ORAL AT BEDTIME
Refills: 0 | Status: DISCONTINUED | OUTPATIENT
Start: 2022-07-20 | End: 2022-07-28

## 2022-07-20 RX ORDER — HEPARIN SODIUM 5000 [USP'U]/ML
5500 INJECTION INTRAVENOUS; SUBCUTANEOUS EVERY 6 HOURS
Refills: 0 | Status: DISCONTINUED | OUTPATIENT
Start: 2022-07-20 | End: 2022-07-21

## 2022-07-20 RX ORDER — HEPARIN SODIUM 5000 [USP'U]/ML
5500 INJECTION INTRAVENOUS; SUBCUTANEOUS ONCE
Refills: 0 | Status: COMPLETED | OUTPATIENT
Start: 2022-07-20 | End: 2022-07-20

## 2022-07-20 RX ORDER — HEPARIN SODIUM 5000 [USP'U]/ML
2500 INJECTION INTRAVENOUS; SUBCUTANEOUS EVERY 6 HOURS
Refills: 0 | Status: DISCONTINUED | OUTPATIENT
Start: 2022-07-20 | End: 2022-07-21

## 2022-07-20 RX ORDER — ACETAMINOPHEN 500 MG
650 TABLET ORAL EVERY 6 HOURS
Refills: 0 | Status: DISCONTINUED | OUTPATIENT
Start: 2022-07-20 | End: 2022-07-28

## 2022-07-20 RX ORDER — FUROSEMIDE 40 MG
20 TABLET ORAL DAILY
Refills: 0 | Status: DISCONTINUED | OUTPATIENT
Start: 2022-07-20 | End: 2022-07-21

## 2022-07-20 RX ORDER — CEFTRIAXONE 500 MG/1
1000 INJECTION, POWDER, FOR SOLUTION INTRAMUSCULAR; INTRAVENOUS ONCE
Refills: 0 | Status: COMPLETED | OUTPATIENT
Start: 2022-07-20 | End: 2022-07-20

## 2022-07-20 RX ORDER — ACETAMINOPHEN 500 MG
650 TABLET ORAL ONCE
Refills: 0 | Status: COMPLETED | OUTPATIENT
Start: 2022-07-20 | End: 2022-07-20

## 2022-07-20 RX ORDER — PIPERACILLIN AND TAZOBACTAM 4; .5 G/20ML; G/20ML
3.38 INJECTION, POWDER, LYOPHILIZED, FOR SOLUTION INTRAVENOUS EVERY 8 HOURS
Refills: 0 | Status: DISCONTINUED | OUTPATIENT
Start: 2022-07-20 | End: 2022-07-27

## 2022-07-20 RX ORDER — ONDANSETRON 8 MG/1
4 TABLET, FILM COATED ORAL EVERY 6 HOURS
Refills: 0 | Status: DISCONTINUED | OUTPATIENT
Start: 2022-07-20 | End: 2022-07-28

## 2022-07-20 RX ORDER — PIPERACILLIN AND TAZOBACTAM 4; .5 G/20ML; G/20ML
3.38 INJECTION, POWDER, LYOPHILIZED, FOR SOLUTION INTRAVENOUS ONCE
Refills: 0 | Status: COMPLETED | OUTPATIENT
Start: 2022-07-20 | End: 2022-07-20

## 2022-07-20 RX ORDER — SODIUM CHLORIDE 9 MG/ML
500 INJECTION INTRAMUSCULAR; INTRAVENOUS; SUBCUTANEOUS ONCE
Refills: 0 | Status: COMPLETED | OUTPATIENT
Start: 2022-07-20 | End: 2022-07-20

## 2022-07-20 RX ORDER — ATORVASTATIN CALCIUM 80 MG/1
40 TABLET, FILM COATED ORAL AT BEDTIME
Refills: 0 | Status: DISCONTINUED | OUTPATIENT
Start: 2022-07-20 | End: 2022-07-28

## 2022-07-20 RX ORDER — SODIUM CHLORIDE 9 MG/ML
1100 INJECTION INTRAMUSCULAR; INTRAVENOUS; SUBCUTANEOUS ONCE
Refills: 0 | Status: DISCONTINUED | OUTPATIENT
Start: 2022-07-20 | End: 2022-07-20

## 2022-07-20 RX ORDER — SODIUM CHLORIDE 9 MG/ML
1000 INJECTION INTRAMUSCULAR; INTRAVENOUS; SUBCUTANEOUS ONCE
Refills: 0 | Status: DISCONTINUED | OUTPATIENT
Start: 2022-07-20 | End: 2022-07-20

## 2022-07-20 RX ORDER — QUETIAPINE FUMARATE 200 MG/1
1 TABLET, FILM COATED ORAL
Qty: 0 | Refills: 0 | DISCHARGE

## 2022-07-20 RX ORDER — HEPARIN SODIUM 5000 [USP'U]/ML
INJECTION INTRAVENOUS; SUBCUTANEOUS
Qty: 25000 | Refills: 0 | Status: DISCONTINUED | OUTPATIENT
Start: 2022-07-20 | End: 2022-07-21

## 2022-07-20 RX ORDER — METOPROLOL TARTRATE 50 MG
5 TABLET ORAL EVERY 6 HOURS
Refills: 0 | Status: DISCONTINUED | OUTPATIENT
Start: 2022-07-20 | End: 2022-07-28

## 2022-07-20 RX ORDER — FUROSEMIDE 40 MG
40 TABLET ORAL DAILY
Refills: 0 | Status: DISCONTINUED | OUTPATIENT
Start: 2022-07-20 | End: 2022-07-20

## 2022-07-20 RX ORDER — ASPIRIN/CALCIUM CARB/MAGNESIUM 324 MG
81 TABLET ORAL DAILY
Refills: 0 | Status: DISCONTINUED | OUTPATIENT
Start: 2022-07-20 | End: 2022-07-28

## 2022-07-20 RX ORDER — ACETAMINOPHEN 500 MG
1000 TABLET ORAL ONCE
Refills: 0 | Status: COMPLETED | OUTPATIENT
Start: 2022-07-20 | End: 2022-07-20

## 2022-07-20 RX ORDER — METOPROLOL TARTRATE 50 MG
25 TABLET ORAL EVERY 8 HOURS
Refills: 0 | Status: DISCONTINUED | OUTPATIENT
Start: 2022-07-20 | End: 2022-07-22

## 2022-07-20 RX ORDER — PROTRIPTYLINE HCL 5 MG
1 TABLET ORAL
Qty: 0 | Refills: 0 | DISCHARGE

## 2022-07-20 RX ORDER — AZELASTINE 137 UG/1
2 SPRAY, METERED NASAL
Qty: 0 | Refills: 0 | DISCHARGE

## 2022-07-20 RX ORDER — VANCOMYCIN HCL 1 G
1000 VIAL (EA) INTRAVENOUS ONCE
Refills: 0 | Status: COMPLETED | OUTPATIENT
Start: 2022-07-20 | End: 2022-07-20

## 2022-07-20 RX ADMIN — PIPERACILLIN AND TAZOBACTAM 200 GRAM(S): 4; .5 INJECTION, POWDER, LYOPHILIZED, FOR SOLUTION INTRAVENOUS at 15:21

## 2022-07-20 RX ADMIN — HEPARIN SODIUM 1200 UNIT(S)/HR: 5000 INJECTION INTRAVENOUS; SUBCUTANEOUS at 18:32

## 2022-07-20 RX ADMIN — SODIUM CHLORIDE 500 MILLILITER(S): 9 INJECTION INTRAMUSCULAR; INTRAVENOUS; SUBCUTANEOUS at 15:23

## 2022-07-20 RX ADMIN — Medication 5 MILLIGRAM(S): at 17:57

## 2022-07-20 RX ADMIN — QUETIAPINE FUMARATE 25 MILLIGRAM(S): 200 TABLET, FILM COATED ORAL at 22:30

## 2022-07-20 RX ADMIN — HEPARIN SODIUM 5500 UNIT(S): 5000 INJECTION INTRAVENOUS; SUBCUTANEOUS at 17:58

## 2022-07-20 RX ADMIN — ATORVASTATIN CALCIUM 40 MILLIGRAM(S): 80 TABLET, FILM COATED ORAL at 22:30

## 2022-07-20 RX ADMIN — CEFTRIAXONE 100 MILLIGRAM(S): 500 INJECTION, POWDER, FOR SOLUTION INTRAMUSCULAR; INTRAVENOUS at 11:55

## 2022-07-20 RX ADMIN — Medication 400 MILLIGRAM(S): at 11:55

## 2022-07-20 RX ADMIN — Medication 650 MILLIGRAM(S): at 02:05

## 2022-07-20 RX ADMIN — HEPARIN SODIUM 1200 UNIT(S)/HR: 5000 INJECTION INTRAVENOUS; SUBCUTANEOUS at 21:12

## 2022-07-20 RX ADMIN — Medication 250 MILLIGRAM(S): at 17:00

## 2022-07-20 RX ADMIN — SODIUM CHLORIDE 500 MILLILITER(S): 9 INJECTION INTRAMUSCULAR; INTRAVENOUS; SUBCUTANEOUS at 15:30

## 2022-07-20 RX ADMIN — Medication 1000 MILLIGRAM(S): at 15:12

## 2022-07-20 RX ADMIN — PIPERACILLIN AND TAZOBACTAM 25 GRAM(S): 4; .5 INJECTION, POWDER, LYOPHILIZED, FOR SOLUTION INTRAVENOUS at 23:57

## 2022-07-20 NOTE — H&P ADULT - NSHPLABSRESULTS_GEN_ALL_CORE
LABS:                        15.1   24.80 )-----------( 321      ( 20 Jul 2022 11:38 )             45.4     07-20    132<L>  |  89<L>  |  28.2<H>  ----------------------------<  164<H>  5.1   |  27.0  |  0.99    Ca    9.3      20 Jul 2022 11:38    TPro  7.4  /  Alb  3.9  /  TBili  0.8  /  DBili  x   /  AST  98<H>  /  ALT  48<H>  /  AlkPhos  66  07-20    PT/INR - ( 20 Jul 2022 16:20 )   PT: 17.1 sec;   INR: 1.47 ratio       PTT - ( 20 Jul 2022 16:20 )  PTT:31.2 sec  CARDIAC MARKERS ( 20 Jul 2022 11:38 )  x     / 0.31 ng/mL / 334 U/L / x     / 11.4 ng/mL    CT Angio Chest PE Protocol w/ IV Cont (07.20.22 @ 14:29)    ACC: [id number] EXAM:  CT ANGIO CHEST PULM ART [facility]                        ACC: [id number] EXAM:  CT ABDOMEN AND PELVIS IC         Segmental right upper lobe pulmonary embolism.   Redemonstration of minimal right pneumothorax.  Small right pleural effusion again seen. Patchy infiltrates and nodular densities most confluent in the left lower lobe again seen.  No acute pathology in the abdomen or pelvis

## 2022-07-20 NOTE — ED PROVIDER NOTE - PATIENT PORTAL LINK FT
You can access the FollowMyHealth Patient Portal offered by [facility] by registering at the following website: [email or web address]. By joining [employer]’s FollowMyHealth portal, you will also be able to view your health information using other applications (apps) compatible with our system.

## 2022-07-20 NOTE — CONSULT NOTE ADULT - PROBLEM SELECTOR RECOMMENDATION 4
- bilateral pneumonia  - ?viral etiology   - ? aspiration  - pt on ABX  - recommend ID eval and repeat covid swab

## 2022-07-20 NOTE — H&P ADULT - CONVERSATION DETAILS
Discussed advance directives with patient's daughter - Tami Rao who is Healthcare Proxy along with her brother. Family has not discussed about GOC. Tami wants to discuss with her brother before making any decision.

## 2022-07-20 NOTE — CONSULT NOTE ADULT - NS ATTEND AMEND GEN_ALL_CORE FT
agree with above,    89F history significant for dementia (per family still able to do ADLs), anxiety, HTN, HLD, breast cancer (s/p mastectomy 10yrs ago), no known prior cardiac history no outpatient cardiologist, was in the ER overnight for fall and discharged home without blood work, returned this afternoon with recurrent falls and worsening mental status, noted fever 103 and leukocytosis 24K, serum lactate 4.6 given 500m IVF, also pBNP 31K and Trop 0.31, newly dx Afib RVR with EKG with lateral T-wave abnormality, CTA found segmental PE, pulmonary infiltrates  -POCUS TTE done by writer noted cardiomyopathy LV EF ~30%, await complete TTE study  -given current status with altered mental state/sepsis not candidate for invasive workup, start heparin gtt for NSTEMI/Afib and PE  -start metoprolol for Afib HR control and cardiomyopathy  -eventual addition of GDMT when condition more stable  -would start low dose IV Lasix 20mg for acute HFrEF given frailty and ongoing sepsis  -overall prognosis can be guarded given comorbidities, discussed with patient's daughter (Taim) to address goals of care, both she and her brother are HCP.       Flynn Cason DO, [facility]  Faculty Non-Invasive Cardiologist  [phone]

## 2022-07-20 NOTE — ED ADULT TRIAGE NOTE - CHIEF COMPLAINT QUOTE
was seen treated and released yesterday for heat exhaustion. currently here for grunting respirations tachypneic and altered mental status. aox1 self. md diamond here

## 2022-07-20 NOTE — ED ADULT NURSE NOTE - OBJECTIVE STATEMENT
Assumed care of pt at 03:00 Pt A&O unable to answer orientation questions, c/o weakness with suspected fall. Pt was found against the wall by  and BIBEMS. Pt has PMHx of dementia and is confused and restless at this time, but redirected by  and able to calm down. Respirations are even and unlabored, abdomen soft nontender. Pt resting comfortably on stretcher.

## 2022-07-20 NOTE — H&P ADULT - NSHPPHYSICALEXAM_GEN_ALL_CORE
Vital Signs   T(C): 39.6 (20 Jul 2022 11:25), Max: 39.6 (20 Jul 2022 11:25)  T(F): 103.2 (20 Jul 2022 11:25), Max: 103.2 (20 Jul 2022 11:25)  HR: 122 (20 Jul 2022 17:30) (100 - 130)  BP: 105/50 (20 Jul 2022 17:30) (100/65 - 166/98)  RR: 22 (20 Jul 2022 17:30) (16 - 22)  SpO2: 100% (20 Jul 2022 17:30) (95% - 100%)  General: Elderly female lying in bed with mild tachypnea. No acute distress  HEENT: PERRL. Clear conjunctivae. Moist mucus membrane  Neck: Supple.   Chest: Decreased air entry. No wheezing or rhonchi. No chest wall tenderness.  Heart: Normal S1 & S2 with RRR.   Abdomen: Soft. Non-tender. Non-distended. + BS  Ext: No pedal edema. No calf tenderness. Chronic skin changes.   Neuro: Lethargic. Moves all four extremities.   Skin: Warm and Dry  Psychiatry: Unable to assess

## 2022-07-20 NOTE — CONSULT NOTE ADULT - PROBLEM SELECTOR RECOMMENDATION 9
- pt presented w/ elected troponin consistent w/ NSTEMI  - bedside point of care echo shows reduced EF 30%  - pt has no previous cardiology workup but known to have possible underlying malignancy based off 2021 CT scan showing LLL pulmonary nodule, had prior breast cancer   - noted to have PE  - Pt is volume overloaded  - give lasix 40mg daily IV   - start heparin infusion full ac protocol   - IV abx for underlying infection, ID following   - rate control w/ metoprolol   - discussed plan of care w/ pts daughter Tami [phone]  - there is currently no plan for inpatient ischemic eval w/ intervention, medical management plan for now  - pt is critically ill with guarded prognosis.   - recommend ICU evaluation   - check echo   - trend troponin w/ serial EKGs   - should repeat RVP for possible false negative covid  -

## 2022-07-20 NOTE — ED ADULT TRIAGE NOTE - CHIEF COMPLAINT QUOTE
Pt. BIBA for questionable fall. Pt. family states they heard a thump and she was standing against the wall. Pt. complaining of b/l leg pain. vaginal pain and left wrist pain.  As per family pt. cries and makes up complaints of pain for attention. Pt. crying and not giving any info In triage. hx of dementia. held 2* to pt lethargy contact guard

## 2022-07-20 NOTE — ED PROVIDER NOTE - PHYSICAL EXAMINATION
Gen: Well appearing in NAD  Head: NC/AT  Neck: trachea midline. No midline or paraspinal tenderness  Card: regular rate and rhythm  Resp:  CTAB  Abd: soft, non-distended, non-tender. Pelvis stable  Ext: No deformities  Neuro:  A&O appears non focal  Skin:  Warm and dry as visualized  Psych:  Normal affect and mood Gen: Well appearing in NAD  Head: NC/AT  Neck: trachea midline. No midline or paraspinal tenderness, full ROM  Card: regular rate and rhythm  Resp:  CTAB  Abd: soft, non-distended, non-tender.   Ext: No deformities, Pelvis stable, FROM all extremities. No ttp or edema noted to L hand. FROM L hand. radial pulse intact.   Neuro:  A&O appears non focal  Skin:  Warm and dry as visualized  Psych:  Normal affect and mood

## 2022-07-20 NOTE — H&P ADULT - ASSESSMENT
INCOMPLETE 89 year old female with history of Dementia, Anxiety, HTN, HLD and Breast Cancer s/p Mastectomy + Chemo brought by EMS with altered mental status. As per daughter - Tami Rao, patient was doing well until yesterday. Last night, she had a fall and was brought to the hospital. She was evaluated and later was discharged home. She was agitated when she got home but after some time she went to sleep. Later during the morning, family found her on the floor. She was confused so she was brought to the hospital. Family noticed some shortness of breath yesterday which they were attributing to agitation. No fever, sick contacts or recent travel.  In ER, she was found to have Sepsis likely secondary to Pneumonia, she was given IVF and Zosyn/Vanco. Also found to have NSTEMI and PE, seen by Cardiology and was started on Heparin Infusion. Bedside ECHO with EF of 30%.     1) Sepsis   - Likely secondary to Pneumonia  - R/O Aspiration   - Follow cultures  - s/p Zosyn and Vanco in ER, will continue Zosyn  - If no improvement, will consider ID eval    2) NSTEMI  - H/O HTN and HLD  - Trend cardiac enzymes  - ECHO  - Start Aspirin   - Continue Statin and Metoprolol   - Cardiology Consult noted    3) Acute Systolic Heart Failure   - Strict intake/output and daily weight  - ECHO  - Started on Lasix 40 mg IVP daily  - Continue Metoprolol  - Cardiology Consult noted    4) Acute Pulmonary Embolism  - Continue Heparin Infusion  - Check LE Doppler     5) Pneumothorax  - Tiny right sided  - Likely due to pneumonia  - Monitor     6) Dementia with Behavioral Symptoms  - Continue Seroquel at bedtime    DVT Prophylaxis -- Patient is on Heparin Infusion.     Advance Directives: Discussed GOC with daughter - Tami Rao, she wants to discuss with her brother who is also HCP. Full Code for now. Prognosis guarded.    Dispo: Unclear at this time.  89 year old female with history of Dementia, Anxiety, HTN, HLD and Breast Cancer s/p Mastectomy + Chemo brought by EMS with altered mental status. As per daughter - Tami Rao, patient was doing well until yesterday. Last night, she had a fall and was brought to the hospital. She was evaluated and later was discharged home. She was agitated when she got home but after some time she went to sleep. Later during the morning, family found her on the floor. She was confused so she was brought to the hospital. Family noticed some shortness of breath yesterday which they were attributing to agitation. No fever, sick contacts or recent travel.  In ER, she was found to have Sepsis likely secondary to Pneumonia, she was given IVF and Zosyn/Vanco. Also found to have NSTEMI and PE, seen by Cardiology and was started on Heparin Infusion. Bedside ECHO with EF of 30%.     1) Sepsis   - Likely secondary to Pneumonia  - R/O Aspiration   - Follow cultures  - s/p Zosyn and Vanco in ER, will continue Zosyn  - If no improvement, will consider ID eval    2) NSTEMI  - H/O HTN and HLD  - Trend cardiac enzymes  - ECHO  - Start Aspirin   - Continue Statin and Metoprolol   - Cardiology Consult noted    3) Acute Systolic Heart Failure   - Strict intake/output and daily weight  - ECHO  - Started on Lasix 40 mg IVP daily  - Continue Metoprolol  - Cardiology Consult noted    4) New Onset A. Fib   - Continue Metoprolol  - Continue Heparin Infusion     5) Acute Pulmonary Embolism  - Continue Heparin Infusion  - Check LE Doppler     6) Pneumothorax  - Tiny right sided  - Likely due to pneumonia  - Monitor     7) Dementia with Behavioral Symptoms  - Continue Seroquel at bedtime    DVT Prophylaxis -- Patient is on Heparin Infusion.     Advance Directives: Discussed GOC with daughter - Tami Rao, she wants to discuss with her brother who is also HCP. Full Code for now. Prognosis guarded.    Dispo: Unclear at this time.

## 2022-07-20 NOTE — ED PROCEDURE NOTE - PROCEDURE ADDITIONAL DETAILS
moderately decreased EF bl small pleural effusions with b-lines at lower lobes; no sliding to right apex ? pneurmothorax no pericardial effusion

## 2022-07-20 NOTE — H&P ADULT - NSICDXPASTMEDICALHX_GEN_ALL_CORE_FT
PAST MEDICAL HISTORY:  Anxiety     Breast cancer     Dementia     HLD (hyperlipidemia)     HTN (hypertension)

## 2022-07-20 NOTE — ED ADULT NURSE REASSESSMENT NOTE - NS ED NURSE REASSESS COMMENT FT1
pt made more comfortable, remains in AF with more controlled rate on monitor. Heparin gtt infusing at 1200 units per hour as per orders. pt continues to attempt to get up but able to reorient to surroundings. remains on supplemental 02 for oxygenation, tolerating well. report given to [facility] RN at this time and pt moved to holding.

## 2022-07-20 NOTE — ED PROVIDER NOTE - PHYSICAL EXAMINATION
Head: atraumatic, normacephalic  Face: atraumatic, no crepitus no orbiral/maxillary/mandibular ttp  eyes: perrla eomi  heart: rrr s1s2  lungs: ctab  abd: soft, nt nd +bs no rebound/guarding no cva ttp  skin: warm  LE: no swelling, no calf ttp  back: no midline cervical/thoracic/lumbar ttp  NEURO: aaox 2 to name and place. no facial droop moving all extremeties

## 2022-07-20 NOTE — PATIENT PROFILE ADULT - FALL HARM RISK - HARM RISK INTERVENTIONS
Assistance with ambulation/Assistance OOB with selected safe patient handling equipment/Communicate Risk of Fall with Harm to all staff/Reinforce activity limits and safety measures with patient and family/Tailored Fall Risk Interventions/Toileting schedule using arm’s reach rule for commode and bathroom/Use of alarms - bed, chair and/or voice tab/Visual Cue: Yellow wristband and red socks/Bed in lowest position, wheels locked, appropriate side rails in place/Call bell, personal items and telephone in reach/Instruct patient to call for assistance before getting out of bed or chair/Non-slip footwear when patient is out of bed/[place] to call system/Physically safe environment - no spills, clutter or unnecessary equipment/Purposeful Proactive Rounding/Room/bathroom lighting operational, light cord in reach

## 2022-07-20 NOTE — ED ADULT NURSE REASSESSMENT NOTE - NS ED NURSE REASSESS COMMENT FT1
pt now noted to be in rapid AF, ER MD made aware. pt BP stable, 02 sat stable on 4 LPM 02 at this time at 100%. pt responsive to verbal stim. answers yes and no questions appropriately. no distress noted.

## 2022-07-20 NOTE — ED PROVIDER NOTE - OBJECTIVE STATEMENT
89F hx of dementia aaox  2 at baseline, anxiety, dementia, HTN and HLD pw hypoxia sat 84%on RA. +AMS. PT came home from ed at 5 am and around 11am was found next to her bed more confused then usual. pt denies any symptoms

## 2022-07-20 NOTE — CONSULT NOTE ADULT - SUBJECTIVE AND OBJECTIVE BOX
[facility]                                              CARDIOLOGY AT [place] [address]                                             Telephone: [phone]. Fax:[phone]                                                       CARDIOLOGY CONSULTATION NOTE                                                                                             History obtained by: Patient and medical record  Community Cardiologist:    obtained: Yes [  ] No [  ]  Reason for Consultation:   Available out pt records reviewed: Yes [  ] No [  ]    Chief complaint:    Patient is a 89y old  Female who presents with a chief complaint of     HPI:      CARDIAC TESTING   ECHO:    STRESS:    CATH:     ELECTROPHYSIOLOGY:     PAST MEDICAL HISTORY  Dementia    HLD (hyperlipidemia)    HTN (hypertension)    Anxiety        PAST SURGICAL HISTORY  No significant past surgical history        SOCIAL HISTORY:  Denies smoking/alcohol/drugs  CIGARETTES:     ALCOHOL:  DRUGS:    FAMILY HISTORY:    Family History of Cardiovascular Disease:  Yes [  ] No [  ]  Coronary Artery Disease in first degree relative: Yes [  ] No [  ]  Sudden Cardiac Death in First degree relative: Yes [  ] No [  ]    HOME MEDICATIONS:      CURRENT CARDIAC MEDICATIONS:      CURRENT OTHER MEDICATIONS:  heparin   Injectable 5500 Unit(s) IV Push once, Stop order after: 1 Doses  heparin   Injectable 5500 Unit(s) IV Push every 6 hours PRN For aPTT less than 40  heparin   Injectable 2500 Unit(s) IV Push every 6 hours PRN For aPTT between 40 - 57  heparin  Infusion.  Unit(s)/Hr (12 mL/Hr) IV Continuous <Continuous>  vancomycin  IVPB. 1000 milliGRAM(s) IV Intermittent once, Stop order after: 1 Doses      ALLERGIES:   No Known Allergies      REVIEW OF SYMPTOMS:   CONSTITUTIONAL: No fever, no chills, no weight loss, no weight gain, no fatigue   ENMT:  No vertigo; No sinus or throat pain  NECK: No pain or stiffness  CARDIOVASCULAR: No chest pain, no dyspnea, no syncope/presyncope, no palpitations, no dizziness, no Orthopnea, no Paroxsymal nocturnal dyspnea  RESPIRATORY: no Shortness of breath, no cough, no wheezing  : No dysuria, no hematuria   GI: No dark color stool, no nausea, no diarrhea, no constipation, no abdominal pain   NEURO: No headache, no slurred speech   MUSCULOSKELETAL: No joint pain or swelling; No muscle, back, or extremity pain  PSYCH: No agitation, no anxiety.    ALL OTHER REVIEW OF SYSTEMS ARE NEGATIVE.    VITAL SIGNS:  T(C): 39.6 (07-20-22 @ 11:25), Max: 39.6 (07-20-22 @ 11:25)  T(F): 103.2 (07-20-22 @ 11:25), Max: 103.2 (07-20-22 @ 11:25)  HR: 105 (07-20-22 @ 16:00) (100 - 130)  BP: 101/55 (07-20-22 @ 16:00) (100/65 - 166/98)  RR: 22 (07-20-22 @ 16:00) (16 - 22)  SpO2: 98% (07-20-22 @ 16:00) (95% - 100%)    INTAKE AND OUTPUT:       PHYSICAL EXAM:  Constitutional: Comfortable . No acute distress.   HEENT: Atraumatic and normocephalic , neck is supple . no JVD. No carotid bruit.  CNS: A&Ox3. No focal deficits.   Respiratory: CTAB, unlabored   Cardiovascular: RRR normal s1 s2. No murmur. No rubs or gallop.  Gastrointestinal: Soft, non-tender. +Bowel sounds.   Extremities: 2+ Peripheral Pulses, No clubbing, cyanosis, or edema  Psychiatric: Calm . no agitation.   Skin: Warm and dry, no ulcers on extremities     LABS:  ( 20 Jul 2022 11:38 )  Troponin T  0.31<H>,  CPK  334<H>, CKMB  X    , BNP 03098<H>                              15.1   24.80 )-----------( 321      ( 20 Jul 2022 11:38 )             45.4     07-20    132<L>  |  89<L>  |  28.2<H>  ----------------------------<  164<H>  5.1   |  27.0  |  0.99    Ca    9.3      20 Jul 2022 11:38    TPro  7.4  /  Alb  3.9  /  TBili  0.8  /  DBili  x   /  AST  98<H>  /  ALT  48<H>  /  AlkPhos  66  07-20                INTERPRETATION OF TELEMETRY:     ECG:   Prior ECG: Yes [  ] No [  ]    RADIOLOGY & ADDITIONAL STUDIES:    X-ray:    CT scan:   MRI:   US:                                                [facility]                                              CARDIOLOGY AT [place] [address]                                             Telephone: [phone]. Fax:[phone]                                                       CARDIOLOGY CONSULTATION NOTE                                                                                             History obtained by: Patient and medical record  Community Cardiologist: n/a    obtained: Yes [  ] No [ x ]  Reason for Consultation: Elevated troponin  Available out pt records reviewed: Yes [  ] No [ x ]     Chief complaint:    Patient is a 89y old  Female who presents with a chief complaint of elevated troponin     HPI:  This is a 89 yr old F w/ PMH of dementia, anxiety, HTN, HLD, previously seen in 2021 w/ 8mm LLL nodule and RUL ?scarring on CT chest has no documented f/u on outpatient record since 2021.. presented yesterday with heat exhaustion, now presented to [facility] ED w/ hypoxia sat 84%on RA and worsening +AMS. CTA chest noted to have RUL PE, extensive patchy infiltrates bilaterally, and nodular densities in LLL concerning for malignancy vs covid. extensive st segment abnormalities seen on ekg and bedside POCUS echo showing reduced ef 30%. Cardiology consulted for elevated troponin        PAST MEDICAL HISTORY  Dementia    HLD (hyperlipidemia)    HTN (hypertension)    Anxiety      PAST SURGICAL HISTORY  unknown       SOCIAL HISTORY:  unknown, pt is confused  CIGARETTES:     ALCOHOL:  DRUGS:    FAMILY HISTORY: unknown     Family History of Cardiovascular Disease:  Yes [  ] No [  ]  Coronary Artery Disease in first degree relative: Yes [  ] No [  ]  Sudden Cardiac Death in First degree relative: Yes [  ] No [  ]    CURRENT OTHER MEDICATIONS:  heparin   Injectable 5500 Unit(s) IV Push once, Stop order after: 1 Doses  heparin   Injectable 5500 Unit(s) IV Push every 6 hours PRN For aPTT less than 40  heparin   Injectable 2500 Unit(s) IV Push every 6 hours PRN For aPTT between 40 - 57  heparin  Infusion.  Unit(s)/Hr (12 mL/Hr) IV Continuous <Continuous>  vancomycin  IVPB. 1000 milliGRAM(s) IV Intermittent once, Stop order after: 1 Doses      ALLERGIES:   No Known Allergies      REVIEW OF SYMPTOMS: cannot obtain 2/2 AMS     VITAL SIGNS:  T(C): 39.6 (07-20-22 @ 11:25), Max: 39.6 (07-20-22 @ 11:25)  T(F): 103.2 (07-20-22 @ 11:25), Max: 103.2 (07-20-22 @ 11:25)  HR: 105 (07-20-22 @ 16:00) (100 - 130)  BP: 101/55 (07-20-22 @ 16:00) (100/65 - 166/98)  RR: 22 (07-20-22 @ 16:00) (16 - 22)  SpO2: 98% (07-20-22 @ 16:00) (95% - 100%)    INTAKE AND OUTPUT:       PHYSICAL EXAM:  Constitutional: lethargic, confused, cachectic   HEENT: Atraumatic and normocephalic , neck is supple . no JVD. No carotid bruit.  CNS: A&Ox3. No focal deficits.   Respiratory: severely diminished   Cardiovascular: regularly irregular, tachycardic   Gastrointestinal: Soft, non-tender. +Bowel sounds.   Extremities: 2+ Peripheral Pulses, No clubbing, cyanosis, or edema  Psychiatric: Calm . no agitation.   Skin: Warm and dry, no ulcers on extremities     LABS:  ( 20 Jul 2022 11:38 )  Troponin T  0.31<H>,  CPK  334<H>, CKMB  X    , BNP 75163<H>                        15.1   24.80 )-----------( 321      ( 20 Jul 2022 11:38 )             45.4     07-20    132<L>  |  89<L>  |  28.2<H>  ----------------------------<  164<H>  5.1   |  27.0  |  0.99    Ca    9.3      20 Jul 2022 11:38    TPro  7.4  /  Alb  3.9  /  TBili  0.8  /  DBili  x   /  AST  98<H>  /  ALT  48<H>  /  AlkPhos  66  07-20      INTERPRETATION OF TELEMETRY:     ECG:   Prior ECG: Yes [  ] No [  ]    RADIOLOGY & ADDITIONAL STUDIES:   < from: CT Angio Chest PE Protocol w/ IV Cont (07.20.22 @ 14:29) >  FINDINGS:  CHEST:  LUNGS AND LARGE AIRWAYS: Patent central airways. The lungs are remarkable   for somewhat patchy infiltrates in the lower lobes with more confluent   infiltrate in the left lower lobe. Nodular densities are seen in the   right middle lobe. Peripheral infiltrates are seenin the upper lobes.   There is bronchiectasis in the left apex. Minimal right pneumothorax is   again noted  PLEURA: Small right pleural effusion.  VESSELS: Atherosclerotic calcification of the aorta. Segmental pulmonary   embolism to the right upperlobe is seen on images 235 to 240 of series 5  HEART: Mild hepatomegaly. No pericardial effusion.  MEDIASTINUM AND NITO: No lymphadenopathy.  CHEST WALL AND LOWER NECK: Suggestion of small thyroid nodules.    ABDOMEN AND PELVIS:  LIVER: Within normallimits.  BILE DUCTS: Normal caliber.  GALLBLADDER: Cholecystectomy.  SPLEEN: Within normal limits.  PANCREAS: Within normal limits.  ADRENALS: Within normal limits.  KIDNEYS/URETERS: Unremarkable.    BLADDER: Within normal limits.  REPRODUCTIVE ORGANS: Small uterus is seen. No pelvic mass    BOWEL: No bowel obstruction. Appendix is not visualized. No evidence of   inflammation in the pericecal region.  PERITONEUM: No ascites.  VESSELS: Atherosclerotic changes.  RETROPERITONEUM/LYMPH NODES: No lymphadenopathy.  ABDOMINAL WALL: Within normal limits.  BONES: Degenerative changes. Pectus excavatum of the anterior chest wall    IMPRESSION: Segmental right upper lobe pulmonary embolism.   Redemonstration of minimal right pneumothorax.  Small right pleural effusion again seen. Patchy infiltrates and nodular   densities most confluent in the left lower lobe again seen.  No acute pathology in the abdomen or pelvis  Results were discussed with the provider Johnna Turcios at 3:35 PM on   7/20/2022    --- End of Report ---    < end of copied text >

## 2022-07-20 NOTE — ED ADULT NURSE REASSESSMENT NOTE - NS ED NURSE REASSESS COMMENT FT1
pt remains in same mental state, follows directions and answers yes or no questions appropriately but asking repetitive things. pt ripped out her IV and attempted to get up ot use bathroom, reoriented to surroundings and prima fit remains in place. pt cleaned up, sheets changed and made more comfortable. IV replaced. pt admitted to step down unit and awaiting bed assignment. HR more controlled at this time. safety maintained, pt in no distress, remains on 3LPM 02 via nasal cannula, will continue to monitor closely.

## 2022-07-20 NOTE — ED PROVIDER NOTE - OBJECTIVE STATEMENT
89 y/o female with PMHx of anxiety, dementia, HTN and HLD present to ED after pt was found up against a wall s/p possible fall. Pt is currently complaining of left wrist pain, and bilateral ankle swelling. Pt is currently crying and in obvious distress. No one in family reports seeing pt fall. 89 y/o female with PMHx of anxiety, dementia, HTN and HLD present to ED after pt was found up against a wall s/p possible fall.  Patient confused and not reporting any specific injury.  stating he thinks L hand is swollen. No one in family reports seeing pt fall.

## 2022-07-20 NOTE — ED PROVIDER NOTE - CLINICAL SUMMARY MEDICAL DECISION MAKING FREE TEXT BOX
code sepsis; pt also found in a hot apartment elevated wbc will tx with abx; bedside sono no lung sliding to right apex; diffuse bl b-lines and bl small pleural effusions with decreased EF will only give mild fluid due to finding; cta + for pe small pneumothorax; cardiology consult- admit; sating 100% on 3LNC

## 2022-07-20 NOTE — H&P ADULT - HISTORY OF PRESENT ILLNESS
INCOMPLETE 89 year old female with history of Dementia, Anxiety, HTN, HLD and Breast Cancer s/p Mastectomy + Chemo brought by EMS with altered mental status. As per daughter - Tami Rao, patient was doing well until yesterday. Last night, she had a fall and was brought to the hospital. She was evaluated and later was discharged home. She was agitated when she got home but after some time she went to sleep. Later during the morning, family found her on the floor. She was confused so she was brought to the hospital. Family noticed some shortness of breath yesterday which they were attributing to agitation. No fever, sick contacts or recent travel.  In ER, she was found to have Sepsis likely secondary to Pneumonia, she was given IVF and Zosyn/Vanco. Also found to have NSTEMI and PE, seen by Cardiology and was started on Heparin Infusion. Bedside ECHO with EF of 30%.

## 2022-07-20 NOTE — H&P ADULT - NSHPSOCIALHISTORY_GEN_ALL_CORE
.   Lives with  and Daughter.  Walks independently.   Former smoker - quit > 40 years ago.  No alcohol or illicit drug use.

## 2022-07-20 NOTE — ED PROVIDER NOTE - CARE PLAN
1 Principal Discharge DX:	Hypoxic  Secondary Diagnosis:	Other specified sepsis  Secondary Diagnosis:	Pulmonary embolism

## 2022-07-20 NOTE — CONSULT NOTE ADULT - ASSESSMENT
This is a 89 yr old F w/ PMH of dementia, anxiety, HTN, HLD, previously seen in 2021 w/ 8mm LLL nodule and RUL ?scarring on CT chest has no documented f/u on outpatient record since 2021.. presented yesterday with heat exhaustion, now presented to [facility] ED w/ hypoxia sat 84%on RA and worsening +AMS. CTA chest noted to have RUL PE, extensive patchy infiltrates bilaterally, and nodular densities in LLL concerning for malignancy vs covid. extensive st segment abnormalities seen on ekg and bedside POCUS echo showing reduced ef 30%. Cardiology consulted for elevated troponin

## 2022-07-20 NOTE — ED ADULT NURSE NOTE - CHIEF COMPLAINT QUOTE
Pt. BIBA for questionable fall. Pt. family states they heard a thump and she was standing against the wall. Pt. complaining of b/l leg pain. vaginal pain and left wrist pain.  As per family pt. cries and makes up complaints of pain for attention. Pt. crying and not giving any info In triage. hx of dementia.

## 2022-07-20 NOTE — CONSULT NOTE ADULT - PROBLEM SELECTOR RECOMMENDATION 2
- as above  - heparin infusion for now   - no plan for mech thrombectomy /catheter TPA, segmental PE not saddle and otherwise hemodynamically stable considering underlying infection

## 2022-07-21 DIAGNOSIS — I48.91 UNSPECIFIED ATRIAL FIBRILLATION: ICD-10-CM

## 2022-07-21 LAB
A1C WITH ESTIMATED AVERAGE GLUCOSE RESULT: 6.2 % — HIGH (ref 4–5.6)
ALBUMIN SERPL ELPH-MCNC: 2.9 G/DL — LOW (ref 3.3–5.2)
ALP SERPL-CCNC: 50 U/L — SIGNIFICANT CHANGE UP (ref 40–120)
ALT FLD-CCNC: 44 U/L — HIGH
ANION GAP SERPL CALC-SCNC: 9 MMOL/L — SIGNIFICANT CHANGE UP (ref 5–17)
APTT BLD: 78.8 SEC — HIGH (ref 27.5–35.5)
APTT BLD: >200 SEC — CRITICAL HIGH (ref 27.5–35.5)
APTT BLD: >200 SEC — CRITICAL HIGH (ref 27.5–35.5)
AST SERPL-CCNC: 77 U/L — HIGH
BASOPHILS # BLD AUTO: 0.06 K/UL — SIGNIFICANT CHANGE UP (ref 0–0.2)
BASOPHILS NFR BLD AUTO: 0.3 % — SIGNIFICANT CHANGE UP (ref 0–2)
BILIRUB SERPL-MCNC: 0.6 MG/DL — SIGNIFICANT CHANGE UP (ref 0.4–2)
BUN SERPL-MCNC: 20.1 MG/DL — HIGH (ref 8–20)
CALCIUM SERPL-MCNC: 7.7 MG/DL — LOW (ref 8.6–10.2)
CHLORIDE SERPL-SCNC: 96 MMOL/L — LOW (ref 98–107)
CHOLEST SERPL-MCNC: 101 MG/DL — SIGNIFICANT CHANGE UP
CK MB CFR SERPL CALC: 16.7 NG/ML — HIGH (ref 0–6.7)
CK SERPL-CCNC: 530 U/L — HIGH (ref 25–170)
CO2 SERPL-SCNC: 28 MMOL/L — SIGNIFICANT CHANGE UP (ref 22–29)
CREAT SERPL-MCNC: 0.5 MG/DL — SIGNIFICANT CHANGE UP (ref 0.5–1.3)
EGFR: 89 ML/MIN/1.73M2 — SIGNIFICANT CHANGE UP
EOSINOPHIL # BLD AUTO: 0.01 K/UL — SIGNIFICANT CHANGE UP (ref 0–0.5)
EOSINOPHIL NFR BLD AUTO: 0 % — SIGNIFICANT CHANGE UP (ref 0–6)
ESTIMATED AVERAGE GLUCOSE: 131 MG/DL — HIGH (ref 68–114)
GLUCOSE SERPL-MCNC: 111 MG/DL — HIGH (ref 70–99)
HCT VFR BLD CALC: 40.6 % — SIGNIFICANT CHANGE UP (ref 34.5–45)
HCT VFR BLD CALC: 42.5 % — SIGNIFICANT CHANGE UP (ref 34.5–45)
HCT VFR BLD CALC: 42.6 % — SIGNIFICANT CHANGE UP (ref 34.5–45)
HDLC SERPL-MCNC: 56 MG/DL — SIGNIFICANT CHANGE UP
HGB BLD-MCNC: 13.6 G/DL — SIGNIFICANT CHANGE UP (ref 11.5–15.5)
HGB BLD-MCNC: 14.1 G/DL — SIGNIFICANT CHANGE UP (ref 11.5–15.5)
HGB BLD-MCNC: 14.3 G/DL — SIGNIFICANT CHANGE UP (ref 11.5–15.5)
IMM GRANULOCYTES NFR BLD AUTO: 0.8 % — SIGNIFICANT CHANGE UP (ref 0–1.5)
LACTATE SERPL-SCNC: 1.7 MMOL/L — SIGNIFICANT CHANGE UP (ref 0.5–2)
LIPID PNL WITH DIRECT LDL SERPL: 32 MG/DL — SIGNIFICANT CHANGE UP
LYMPHOCYTES # BLD AUTO: 1.46 K/UL — SIGNIFICANT CHANGE UP (ref 1–3.3)
LYMPHOCYTES # BLD AUTO: 7.1 % — LOW (ref 13–44)
MAGNESIUM SERPL-MCNC: 2.1 MG/DL — SIGNIFICANT CHANGE UP (ref 1.6–2.6)
MCHC RBC-ENTMCNC: 30.7 PG — SIGNIFICANT CHANGE UP (ref 27–34)
MCHC RBC-ENTMCNC: 30.7 PG — SIGNIFICANT CHANGE UP (ref 27–34)
MCHC RBC-ENTMCNC: 30.9 PG — SIGNIFICANT CHANGE UP (ref 27–34)
MCHC RBC-ENTMCNC: 33.1 GM/DL — SIGNIFICANT CHANGE UP (ref 32–36)
MCHC RBC-ENTMCNC: 33.5 GM/DL — SIGNIFICANT CHANGE UP (ref 32–36)
MCHC RBC-ENTMCNC: 33.6 GM/DL — SIGNIFICANT CHANGE UP (ref 32–36)
MCV RBC AUTO: 91.6 FL — SIGNIFICANT CHANGE UP (ref 80–100)
MCV RBC AUTO: 91.8 FL — SIGNIFICANT CHANGE UP (ref 80–100)
MCV RBC AUTO: 92.8 FL — SIGNIFICANT CHANGE UP (ref 80–100)
MONOCYTES # BLD AUTO: 1.3 K/UL — HIGH (ref 0–0.9)
MONOCYTES NFR BLD AUTO: 6.3 % — SIGNIFICANT CHANGE UP (ref 2–14)
NEUTROPHILS # BLD AUTO: 17.69 K/UL — HIGH (ref 1.8–7.4)
NEUTROPHILS NFR BLD AUTO: 85.5 % — HIGH (ref 43–77)
NON HDL CHOLESTEROL: 45 MG/DL — SIGNIFICANT CHANGE UP
PLATELET # BLD AUTO: 205 K/UL — SIGNIFICANT CHANGE UP (ref 150–400)
PLATELET # BLD AUTO: 215 K/UL — SIGNIFICANT CHANGE UP (ref 150–400)
PLATELET # BLD AUTO: 235 K/UL — SIGNIFICANT CHANGE UP (ref 150–400)
POTASSIUM SERPL-MCNC: 3.9 MMOL/L — SIGNIFICANT CHANGE UP (ref 3.5–5.3)
POTASSIUM SERPL-SCNC: 3.9 MMOL/L — SIGNIFICANT CHANGE UP (ref 3.5–5.3)
PROT SERPL-MCNC: 5.6 G/DL — LOW (ref 6.6–8.7)
RBC # BLD: 4.43 M/UL — SIGNIFICANT CHANGE UP (ref 3.8–5.2)
RBC # BLD: 4.59 M/UL — SIGNIFICANT CHANGE UP (ref 3.8–5.2)
RBC # BLD: 4.63 M/UL — SIGNIFICANT CHANGE UP (ref 3.8–5.2)
RBC # FLD: 14.2 % — SIGNIFICANT CHANGE UP (ref 10.3–14.5)
RBC # FLD: 14.2 % — SIGNIFICANT CHANGE UP (ref 10.3–14.5)
RBC # FLD: 14.3 % — SIGNIFICANT CHANGE UP (ref 10.3–14.5)
SODIUM SERPL-SCNC: 133 MMOL/L — LOW (ref 135–145)
TRIGL SERPL-MCNC: 65 MG/DL — SIGNIFICANT CHANGE UP
TROPONIN T SERPL-MCNC: 0.16 NG/ML — HIGH (ref 0–0.06)
WBC # BLD: 20.67 K/UL — HIGH (ref 3.8–10.5)
WBC # BLD: 20.69 K/UL — HIGH (ref 3.8–10.5)
WBC # BLD: 23.96 K/UL — HIGH (ref 3.8–10.5)
WBC # FLD AUTO: 20.67 K/UL — HIGH (ref 3.8–10.5)
WBC # FLD AUTO: 20.69 K/UL — HIGH (ref 3.8–10.5)
WBC # FLD AUTO: 23.96 K/UL — HIGH (ref 3.8–10.5)

## 2022-07-21 PROCEDURE — 99233 SBSQ HOSP IP/OBS HIGH 50: CPT

## 2022-07-21 PROCEDURE — 93306 TTE W/DOPPLER COMPLETE: CPT | Mod: 26

## 2022-07-21 RX ORDER — ENOXAPARIN SODIUM 100 MG/ML
50 INJECTION SUBCUTANEOUS EVERY 12 HOURS
Refills: 0 | Status: DISCONTINUED | OUTPATIENT
Start: 2022-07-21 | End: 2022-07-28

## 2022-07-21 RX ORDER — SODIUM CHLORIDE 9 MG/ML
250 INJECTION INTRAMUSCULAR; INTRAVENOUS; SUBCUTANEOUS ONCE
Refills: 0 | Status: COMPLETED | OUTPATIENT
Start: 2022-07-21 | End: 2022-07-21

## 2022-07-21 RX ORDER — HEPARIN SODIUM 5000 [USP'U]/ML
INJECTION INTRAVENOUS; SUBCUTANEOUS
Qty: 25000 | Refills: 0 | Status: DISCONTINUED | OUTPATIENT
Start: 2022-07-21 | End: 2022-07-21

## 2022-07-21 RX ORDER — DAPAGLIFLOZIN 10 MG/1
10 TABLET, FILM COATED ORAL DAILY
Refills: 0 | Status: DISCONTINUED | OUTPATIENT
Start: 2022-07-21 | End: 2022-07-28

## 2022-07-21 RX ADMIN — SODIUM CHLORIDE 500 MILLILITER(S): 9 INJECTION INTRAMUSCULAR; INTRAVENOUS; SUBCUTANEOUS at 02:00

## 2022-07-21 RX ADMIN — SODIUM CHLORIDE 500 MILLILITER(S): 9 INJECTION INTRAMUSCULAR; INTRAVENOUS; SUBCUTANEOUS at 01:36

## 2022-07-21 RX ADMIN — HEPARIN SODIUM 1000 UNIT(S)/HR: 5000 INJECTION INTRAVENOUS; SUBCUTANEOUS at 03:39

## 2022-07-21 RX ADMIN — HEPARIN SODIUM 800 UNIT(S)/HR: 5000 INJECTION INTRAVENOUS; SUBCUTANEOUS at 12:39

## 2022-07-21 RX ADMIN — DAPAGLIFLOZIN 10 MILLIGRAM(S): 10 TABLET, FILM COATED ORAL at 18:22

## 2022-07-21 RX ADMIN — PIPERACILLIN AND TAZOBACTAM 25 GRAM(S): 4; .5 INJECTION, POWDER, LYOPHILIZED, FOR SOLUTION INTRAVENOUS at 14:57

## 2022-07-21 RX ADMIN — Medication 1 TABLET(S): at 15:18

## 2022-07-21 RX ADMIN — Medication 81 MILLIGRAM(S): at 15:18

## 2022-07-21 RX ADMIN — PIPERACILLIN AND TAZOBACTAM 25 GRAM(S): 4; .5 INJECTION, POWDER, LYOPHILIZED, FOR SOLUTION INTRAVENOUS at 23:50

## 2022-07-21 RX ADMIN — HEPARIN SODIUM 0 UNIT(S)/HR: 5000 INJECTION INTRAVENOUS; SUBCUTANEOUS at 01:35

## 2022-07-21 RX ADMIN — PIPERACILLIN AND TAZOBACTAM 25 GRAM(S): 4; .5 INJECTION, POWDER, LYOPHILIZED, FOR SOLUTION INTRAVENOUS at 05:44

## 2022-07-21 RX ADMIN — ENOXAPARIN SODIUM 50 MILLIGRAM(S): 100 INJECTION SUBCUTANEOUS at 18:23

## 2022-07-21 RX ADMIN — HEPARIN SODIUM 1000 UNIT(S)/HR: 5000 INJECTION INTRAVENOUS; SUBCUTANEOUS at 07:14

## 2022-07-21 RX ADMIN — HEPARIN SODIUM 0 UNIT(S)/HR: 5000 INJECTION INTRAVENOUS; SUBCUTANEOUS at 11:17

## 2022-07-21 RX ADMIN — ATORVASTATIN CALCIUM 40 MILLIGRAM(S): 80 TABLET, FILM COATED ORAL at 23:51

## 2022-07-21 NOTE — PROGRESS NOTE ADULT - ASSESSMENT
89 year old female with history of Dementia, Anxiety, HTN, HLD and Breast Cancer s/p Mastectomy + Chemo brought by EMS with altered mental status. As per daughter - Tami Rao, patient was doing well until yesterday. Last night, she had a fall and was brought to the hospital. She was evaluated and later was discharged home. She was agitated when she got home but after some time she went to sleep. Later during the morning, family found her on the floor. She was confused so she was brought to the hospital. Family noticed some shortness of breath yesterday which they were attributing to agitation. No fever, sick contacts or recent travel.  In ER, she was found to have Sepsis likely secondary to Pneumonia, she was given IVF and Zosyn/Vanco. Also found to have NSTEMI and PE, seen by Cardiology and was started on Heparin Infusion. Bedside ECHO with EF of 30%.     1) Sepsis   - Likely secondary to Pneumonia (suspected gram negative bacteria)   - R/O Aspiration   - Follow cultures  - s/p Zosyn and Vanco in ER, will continue Zosyn  - If no improvement, will consider ID eval    2) NSTEMI  - H/O HTN and HLD  - ECHO as above   - Heparin Infusion was switched to FD Lovenox   - Started Aspirin   - Continue Statin and Metoprolol   - Cardiology follow up noted     3) Acute Systolic Heart Failure   - Strict intake/output and daily weight  - ECHO as above   - Hold Lasix   - Continue Metoprolol  - Cardiology following     4) New Onset A. Fib   - Continue Metoprolol  - Heparin Infusion was switched to FD Lovenox     5) Acute Pulmonary Embolism  - Heparin Infusion was switched to FD Lovenox   - No DVT on LE Doppler     6) Pneumothorax  - Tiny right sided  - Likely due to pneumonia  - Monitor     7) Dementia with Behavioral Symptoms  - Continue Seroquel at bedtime    8) Prediabetes  - HbA1c 6.2  - Carb consistent diet     DVT Prophylaxis -- Patient is on FD Lovenox.     Advance Directives: Discussed GOC with daughter - Tami Rao, she wants to discuss with her brother who is also HCP. Full Code for now. Prognosis guarded. Palliative Care Consult.     Dispo: Unclear at this time.

## 2022-07-21 NOTE — SWALLOW BEDSIDE ASSESSMENT ADULT - SLP GENERAL OBSERVATIONS
Pt recd asleep, easily awoken w/ verbal stimuli, Language Line  # [id number] utilized, pt minimally verbal, following simple commands, confused,  NC in place, O2SATs @ 100%, NAD, 0/10 pain scale pre/post

## 2022-07-21 NOTE — PROGRESS NOTE ADULT - ASSESSMENT
This is a 89 yr old F w/ PMH of dementia, anxiety, HTN, HLD, previously seen in 2021 w/ 8mm LLL nodule and RUL ?scarring on CT chest has no documented f/u on outpatient record since 2021.. presented yesterday with heat exhaustion, now presented to [facility] ED w/ hypoxia sat 84%on RA and worsening +AMS. CTA chest noted to have RUL PE, extensive patchy infiltrates bilaterally, and nodular densities in LLL concerning for malignancy vs covid. extensive st segment abnormalities seen on ekg and bedside POCUS echo showing reduced ef 30%. Cardiology consulted for elevated troponin

## 2022-07-21 NOTE — PROVIDER CONTACT NOTE (OTHER) - ASSESSMENT
Pt arousable, denies any pain. Dementia at baseline. BP 80/52, HR 90s, % on 2L NC> EF=30% on echo 7/20

## 2022-07-21 NOTE — SWALLOW BEDSIDE ASSESSMENT ADULT - PHARYNGEAL PHASE
4-5 swallows per tsp/Delayed pharyngeal swallow/Delayed throat clear post oral intake consistent 2 swallows per trial/Within functional limits

## 2022-07-21 NOTE — PROGRESS NOTE ADULT - NS ATTEND AMEND GEN_ALL_CORE FT
4 agree with above,    89F history significant for dementia (per family still able to do ADLs), anxiety, HTN, HLD, breast cancer (s/p mastectomy 10yrs ago), no known prior cardiac history no outpatient cardiologist, was in the ER overnight for fall and discharged home without blood work, returned this afternoon with recurrent falls and worsening mental status, noted fever 103 and leukocytosis 24K, serum lactate 4.6 given 500m IVF, also pBNP 31K and Trop 0.31, newly dx Afib RVR with EKG with lateral T-wave abnormality, CTA found segmental PE, pulmonary infiltrates  -TTE with severe cardiomyopathy LV EF 25%  -given cachexia and sepsis not candidate for cath/PCI, would medically manage NSTEMI with ASA/statin/metoprolol, Trop. downtrended- change heparin gtt to LMWH then upon discharge to DOAC for DVT/PE dosing  -low SBP off Lasix, for  now received 250ml bolus overnight  -metoprolol for Afib HR control and cardiomyopathy, if BP cannot tolerate would add low dose digoxin 0.125mg  -unable to add GDMT with low SBP, start Farxiga 10mg  -continue treatement of underlying sepsis  -overall prognosis can be guarded given comorbidities, discussed with patient's daughter (Tami) to address goals of care, both she and her brother are HCP. Consider palliative consult.       Flynn Cason DO, [facility]  Faculty Non-Invasive Cardiologist  [phone].

## 2022-07-21 NOTE — PROGRESS NOTE ADULT - PROBLEM SELECTOR PLAN 4
- bilateral pneumonia  - ?viral etiology   - ? aspiration  - pt on ABX  - recommend ID eval and repeat covid swab.

## 2022-07-21 NOTE — PROGRESS NOTE ADULT - PROBLEM SELECTOR PLAN 1
- pt presented w/ elected troponin consistent w/ NSTEMI  - bedside point of care echo shows reduced EF 30%  - pt has no previous cardiology workup but known to have possible underlying malignancy based off 2021 CT scan showing LLL pulmonary nodule, had prior breast cancer   - noted to have PE  - Pt is volume overloaded but became hypotensive with IV lasix, hold off on lasix for now   - start heparin infusion full ac protocol   - IV abx for underlying infection, ID following   - rate control w/ metoprolol   - discussed plan of care w/ pts daughter Tami [phone]  - -given current status with altered mental state/sepsis not candidate for invasive workup, start heparin gtt for   - pt is critically ill with guarded prognosis.    - check echo   - trend troponin w/ serial EKGs

## 2022-07-21 NOTE — PROGRESS NOTE ADULT - SUBJECTIVE AND OBJECTIVE BOX
[facility]                                                         CARDIOLOGY AT [facility]                                                                  [address] [facility], [place] [place] [address]                                                         Telephone: [phone]. Fax:[phone]                                                                             PROGRESS NOTE    Review of symptoms:   Cardiac:  No chest pain. No dyspnea. No palpitations.  Respiratory: no cough. No dyspnea  Gastrointestinal: No diarrhea. No abdominal pain. No bleeding.   Neuro: No focal neuro complaints.    Vitals:  T(C): 36.2 (07-21-22 @ 08:00), Max: 39.6 (07-20-22 @ 11:25)  HR: 84 (07-21-22 @ 08:01) (84 - 130)  BP: 93/47 (07-21-22 @ 08:01) (80/50 - 116/55)  RR: 18 (07-21-22 @ 08:01) (18 - 22)  SpO2: 99% (07-21-22 @ 08:01) (95% - 100%)  Wt(kg): --  I&O's Summary    20 Jul 2022 07:01  -  21 Jul 2022 07:00  --------------------------------------------------------  IN: 774 mL / OUT: 100 mL / NET: 674 mL      Weight (kg): 52.1 (07-20 @ 23:00)    PHYSICAL EXAM:  Appearance: Comfortable. No acute distress  HEENT:  Atraumatic. Normocephalic.  Normal oral mucosa  Neurologic:confused   Cardiovascular: RRR S1 S2, No murmur, no rubs/gallops. No JVD  Respiratory: severely diminished, poor air entry, crackles diffuse   Gastrointestinal:  Soft, Non-tender, + BS  Lower Extremities: 2+ Peripheral Pulses, No clubbing, cyanosis, or edema  Psychiatry: Patient is calm. No agitation.   Skin: warm and dry.    CURRENT CARDIAC MEDICATIONS:  metoprolol tartrate 25 milliGRAM(s) Oral every 8 hours  metoprolol tartrate Injectable 5 milliGRAM(s) IV Push every 6 hours PRN      CURRENT OTHER MEDICATIONS:  piperacillin/tazobactam IVPB.. 3.375 Gram(s) IV Intermittent every 8 hours  acetaminophen     Tablet .. 650 milliGRAM(s) Oral every 6 hours PRN Temp greater or equal to 38C (100.4F), Mild Pain (1 - 3), Moderate Pain (4 - 6)  ondansetron Injectable 4 milliGRAM(s) IV Push every 6 hours PRN Nausea and/or Vomiting  QUEtiapine 25 milliGRAM(s) Oral at bedtime  atorvastatin 40 milliGRAM(s) Oral at bedtime  aspirin  chewable 81 milliGRAM(s) Oral daily  heparin   Injectable 5500 Unit(s) IV Push every 6 hours PRN For aPTT less than 40  heparin   Injectable 2500 Unit(s) IV Push every 6 hours PRN For aPTT between 40 - 57  heparin  Infusion.  Unit(s)/Hr (10 mL/Hr) IV Continuous <Continuous>  multivitamin 1 Tablet(s) Oral daily      LABS:	 	  ( 21 Jul 2022 05:43 )  Troponin T  0.16<H>,  CPK  530<H>, CKMB  X    , BNP X        , ( 20 Jul 2022 20:20 )  Troponin T  0.20<H>,  CPK  657<H>, CKMB  X    , BNP X        , ( 20 Jul 2022 11:38 )  Troponin T  0.31<H>,  CPK  334<H>, CKMB  X    , BNP 55407<H>                              14.1   20.67 )-----------( 235      ( 21 Jul 2022 10:20 )             42.6     07-21    133<L>  |  96<L>  |  20.1<H>  ----------------------------<  111<H>  3.9   |  28.0  |  0.50    Ca    7.7<L>      21 Jul 2022 05:43  Mg     2.1     07-21    TPro  5.6<L>  /  Alb  2.9<L>  /  TBili  0.6  /  DBili  x   /  AST  77<H>  /  ALT  44<H>  /  AlkPhos  50  07-21    PT/INR/PTT ( 21 Jul 2022 10:20 )                       :                       :       X            :       >200.0                 .        .                   .              .           .       X           .                                       Lipid Profile: Date: 07-21 @ 05:43  Total cholesterol 101; Direct LDL: --; HDL: 56; Triglycerides:65

## 2022-07-21 NOTE — PROVIDER CONTACT NOTE (OTHER) - BACKGROUND
Pt admitted for sepsis, +PE. Pt on heparin gtt, IV ABX. Received metoprolol 5mg IVP at 1800, seroquel at 2200

## 2022-07-21 NOTE — PROGRESS NOTE ADULT - PROBLEM SELECTOR PLAN 2
- as above  - heparin infusion for now   - no plan for mech thrombectomy /catheter TPA, segmental PE not saddle and otherwise hemodynamically stable considering underlying infection.

## 2022-07-21 NOTE — CHART NOTE - NSCHARTNOTEFT_GEN_A_CORE
B/P 80/52(Map 61)  B/P 80/52 P 90 R 20 % T 98  Breathing easy and unlabored  Pt asymptomatic, without complaints  Pt received Seroquel this evening  250cc NS Bolus  Discussed with Cardiology  Continue to monitor B/P 80/52(Map 61)  Pt hypotyensive, BP 80/52, MAP 61  Pt admitted for sepsis, +PE. Pt on heparin gtt, IV ABX. Received metoprolol 5mg IVP at 1800, seroquel at 2200  Pt arousable, denies any pain. Dementia at baseline.   EF=30% on echo 7/20  B/P 80/52 P 90 R 20 % T 98  Breathing easy and unlabored  Pt asymptomatic, without complaints  250cc NS Bolus  Discussed with Cardiology  B/P 78/44  Lungs clear  another 250cc Bolus ordered  will recheck B/P after bolus  Continue to monitor B/P 80/52(Map 61)  Pt hypotyensive, BP 80/52, MAP 61  Pt admitted for sepsis, +PE. Pt on heparin gtt, IV ABX. Received metoprolol 5mg IVP at 1800, seroquel at 2200  Pt arousable, denies any pain. Dementia at baseline.   EF=30% on echo 7/20  B/P 80/52 P 90 R 20 % T 98  Breathing easy and unlabored  Pt asymptomatic, without complaints  250cc NS Bolus  Discussed with Cardiology  B/P 78/44  Lungs clear  Abd soft and nontender  another 250cc Bolus ordered  will recheck B/P after bolus  repeat B/P 116/68  CBC  Continue to monitor B/P 80/52(Map 61)  Pt hypotyensive, BP 80/52, MAP 61  Pt admitted for sepsis, +PE. Pt on heparin gtt, IV ABX. Received metoprolol 5mg IVP at 1800, seroquel at 2200  Pt arousable, denies any pain. Dementia at baseline.   EF=30% on echo 7/20  B/P 80/52 P 90 R 20 % T 98  Breathing easy and unlabored  Pt asymptomatic, without complaints  250cc NS Bolus  Discussed with Cardiology  B/P 78/44  Lungs clear  Abd soft and nontender  another 250cc Bolus ordered  will recheck B/P after bolus  repeat B/P 116/68  CBC H/H 13.6/40.6  Continue to monitor

## 2022-07-21 NOTE — PROGRESS NOTE ADULT - SUBJECTIVE AND OBJECTIVE BOX
Hypoxemia    HPI:  89 year old female with history of Dementia, Anxiety, HTN, HLD and Breast Cancer s/p Mastectomy + Chemo brought by EMS with altered mental status. As per daughter - Tami Rao, patient was doing well until yesterday. Last night, she had a fall and was brought to the hospital. She was evaluated and later was discharged home. She was agitated when she got home but after some time she went to sleep. Later during the morning, family found her on the floor. She was confused so she was brought to the hospital. Family noticed some shortness of breath yesterday which they were attributing to agitation. No fever, sick contacts or recent travel.  In ER, she was found to have Sepsis likely secondary to Pneumonia, she was given IVF and Zosyn/Vanco. Also found to have NSTEMI and PE, seen by Cardiology and was started on Heparin Infusion. Bedside ECHO with EF of 30%.  (20 Jul 2022 17:27)    Interval History:  Patient was seen and examined at bedside around 9:15 am.  Hypotensive overnight, received NS boluses 250 ml x 2.  More alert and awake today. Knows she is in the hospital but does not know why..   Denies chest pain, palpitations, shortness of breath, headache, dizziness, visual symptoms, nausea, vomiting or abdominal pain.    ROS:  As per interval history otherwise unremarkable.    PHYSICAL EXAM:  Vital Signs  T(C): 36.4 (21 Jul 2022 12:00), Max: 37.9 (20 Jul 2022 17:30)  T(F): 97.6 (21 Jul 2022 12:00), Max: 100.2 (20 Jul 2022 17:30)  HR: 86 (21 Jul 2022 12:00) (84 - 112)  BP: 93/78 (21 Jul 2022 14:33) (80/50 - 116/55)  BP(mean): 82 (21 Jul 2022 14:33) (52 - 82)  RR: 16 (21 Jul 2022 12:00) (16 - 22)  SpO2: 97% (21 Jul 2022 12:00) (95% - 100%)  General: Elderly female sitting in bed with mild tachypnea. No acute distress  HEENT: PERRLA. EOMI. Clear conjunctivae. Moist mucus membrane  Neck: Supple.   Chest: Good air entry. Bibasilar rales. No wheezing or rhonchi. No chest wall tenderness.  Heart: Normal S1 & S2 with RRR.   Abdomen: Soft. Non-tender. Non-distended. + BS  Ext: No pedal edema. No calf tenderness. Chronic skin changes.   Neuro: Awake and alert but not completely oriented. Moves all four extremities. Speech clear.   Skin: Warm and Dry  Psychiatry: Normal mood and affect     I&O's Summary    20 Jul 2022 07:01  -  21 Jul 2022 07:00  --------------------------------------------------------  IN: 774 mL / OUT: 100 mL / NET: 674 mL    LABS:                        14.1   20.67 )-----------( 235      ( 21 Jul 2022 10:20 )             42.6     07-21    133<L>  |  96<L>  |  20.1<H>  ----------------------------<  111<H>  3.9   |  28.0  |  0.50    Ca    7.7<L>      21 Jul 2022 05:43  Mg     2.1     07-21    TPro  5.6<L>  /  Alb  2.9<L>  /  TBili  0.6  /  DBili  x   /  AST  77<H>  /  ALT  44<H>  /  AlkPhos  50  07-21  PT/INR - ( 20 Jul 2022 16:20 )   PT: 17.1 sec;   INR: 1.47 ratio    PTT - ( 21 Jul 2022 10:20 )  PTT:>200.0 sec    RADIOLOGY & ADDITIONAL STUDIES:  CT Angio Chest PE Protocol w/ IV Cont (07.20.22 @ 14:29)    ACC: [id number] EXAM:  CT ANGIO CHEST PULM [facility]                        ACC: [id number] EXAM:  CT ABDOMEN AND PELVIS IC         Segmental right upper lobe pulmonary embolism.   Redemonstration of minimal right pneumothorax.  Small right pleural effusion again seen. Patchy infiltrates and nodular densities most confluent in the left lower lobe again seen.  No acute pathology in the abdomen or pelvis    TTE Echo Complete w/ Contrast w/ Doppler (07.21.22 @ 11:50)    1. Left ventricular ejection fraction, by visual estimation, is 20 to 25%.   2. Severely decreased global left ventricular systolic function.   3. Mildly increased left ventricular internal cavity size.   4. Spectral Doppler shows impaired relaxation pattern of left ventricular myocardial filling (Grade I diastolic dysfunction).   5. Normal right ventricular size and function, inadequate estimation of RVSP.   6. Mildly enlarged left atrium.   7. Mildly enlarged right atrium.   8. Mild mitral valve regurgitation.   9. Mild tricuspid regurgitation.  10. Moderate aortic valve stenosis.  11. Mild aortic regurgitation.  12. There is mild aortic root calcification.  13. There is no evidence of pericardial effusion.    MEDICATIONS  (STANDING):  aspirin  chewable 81 milliGRAM(s) Oral daily  atorvastatin 40 milliGRAM(s) Oral at bedtime  enoxaparin Injectable 50 milliGRAM(s) SubCutaneous every 12 hours  metoprolol tartrate 25 milliGRAM(s) Oral every 8 hours  multivitamin 1 Tablet(s) Oral daily  piperacillin/tazobactam IVPB.. 3.375 Gram(s) IV Intermittent every 8 hours  QUEtiapine 25 milliGRAM(s) Oral at bedtime    MEDICATIONS  (PRN):  acetaminophen     Tablet .. 650 milliGRAM(s) Oral every 6 hours PRN Temp greater or equal to 38C (100.4F), Mild Pain (1 - 3), Moderate Pain (4 - 6)  metoprolol tartrate Injectable 5 milliGRAM(s) IV Push every 6 hours PRN for sustain Afib >120s  ondansetron Injectable 4 milliGRAM(s) IV Push every 6 hours PRN Nausea and/or Vomiting

## 2022-07-22 LAB
ALBUMIN SERPL ELPH-MCNC: 3.1 G/DL — LOW (ref 3.3–5.2)
ALP SERPL-CCNC: 50 U/L — SIGNIFICANT CHANGE UP (ref 40–120)
ALT FLD-CCNC: 55 U/L — HIGH
ANION GAP SERPL CALC-SCNC: 8 MMOL/L — SIGNIFICANT CHANGE UP (ref 5–17)
AST SERPL-CCNC: 69 U/L — HIGH
BASOPHILS # BLD AUTO: 0.03 K/UL — SIGNIFICANT CHANGE UP (ref 0–0.2)
BASOPHILS NFR BLD AUTO: 0.2 % — SIGNIFICANT CHANGE UP (ref 0–2)
BILIRUB SERPL-MCNC: 0.5 MG/DL — SIGNIFICANT CHANGE UP (ref 0.4–2)
BUN SERPL-MCNC: 16.2 MG/DL — SIGNIFICANT CHANGE UP (ref 8–20)
CALCIUM SERPL-MCNC: 7.8 MG/DL — LOW (ref 8.6–10.2)
CHLORIDE SERPL-SCNC: 97 MMOL/L — LOW (ref 98–107)
CO2 SERPL-SCNC: 32 MMOL/L — HIGH (ref 22–29)
CREAT SERPL-MCNC: 0.53 MG/DL — SIGNIFICANT CHANGE UP (ref 0.5–1.3)
EGFR: 88 ML/MIN/1.73M2 — SIGNIFICANT CHANGE UP
EOSINOPHIL # BLD AUTO: 0.03 K/UL — SIGNIFICANT CHANGE UP (ref 0–0.5)
EOSINOPHIL NFR BLD AUTO: 0.2 % — SIGNIFICANT CHANGE UP (ref 0–6)
GLUCOSE SERPL-MCNC: 83 MG/DL — SIGNIFICANT CHANGE UP (ref 70–99)
HCT VFR BLD CALC: 41.3 % — SIGNIFICANT CHANGE UP (ref 34.5–45)
HGB BLD-MCNC: 13.4 G/DL — SIGNIFICANT CHANGE UP (ref 11.5–15.5)
IMM GRANULOCYTES NFR BLD AUTO: 0.5 % — SIGNIFICANT CHANGE UP (ref 0–1.5)
LYMPHOCYTES # BLD AUTO: 0.87 K/UL — LOW (ref 1–3.3)
LYMPHOCYTES # BLD AUTO: 4.7 % — LOW (ref 13–44)
MAGNESIUM SERPL-MCNC: 2 MG/DL — SIGNIFICANT CHANGE UP (ref 1.6–2.6)
MCHC RBC-ENTMCNC: 30.5 PG — SIGNIFICANT CHANGE UP (ref 27–34)
MCHC RBC-ENTMCNC: 32.4 GM/DL — SIGNIFICANT CHANGE UP (ref 32–36)
MCV RBC AUTO: 94.1 FL — SIGNIFICANT CHANGE UP (ref 80–100)
MONOCYTES # BLD AUTO: 1.24 K/UL — HIGH (ref 0–0.9)
MONOCYTES NFR BLD AUTO: 6.7 % — SIGNIFICANT CHANGE UP (ref 2–14)
NEUTROPHILS # BLD AUTO: 16.12 K/UL — HIGH (ref 1.8–7.4)
NEUTROPHILS NFR BLD AUTO: 87.7 % — HIGH (ref 43–77)
PLATELET # BLD AUTO: 198 K/UL — SIGNIFICANT CHANGE UP (ref 150–400)
POTASSIUM SERPL-MCNC: 3.8 MMOL/L — SIGNIFICANT CHANGE UP (ref 3.5–5.3)
POTASSIUM SERPL-SCNC: 3.8 MMOL/L — SIGNIFICANT CHANGE UP (ref 3.5–5.3)
PROT SERPL-MCNC: 5.8 G/DL — LOW (ref 6.6–8.7)
RBC # BLD: 4.39 M/UL — SIGNIFICANT CHANGE UP (ref 3.8–5.2)
RBC # FLD: 14.1 % — SIGNIFICANT CHANGE UP (ref 10.3–14.5)
SODIUM SERPL-SCNC: 137 MMOL/L — SIGNIFICANT CHANGE UP (ref 135–145)
WBC # BLD: 18.39 K/UL — HIGH (ref 3.8–10.5)
WBC # FLD AUTO: 18.39 K/UL — HIGH (ref 3.8–10.5)

## 2022-07-22 PROCEDURE — 99233 SBSQ HOSP IP/OBS HIGH 50: CPT

## 2022-07-22 PROCEDURE — 99497 ADVNCD CARE PLAN 30 MIN: CPT | Mod: 25

## 2022-07-22 PROCEDURE — 99222 1ST HOSP IP/OBS MODERATE 55: CPT

## 2022-07-22 RX ORDER — ALPRAZOLAM 0.25 MG
0.25 TABLET ORAL EVERY 8 HOURS
Refills: 0 | Status: DISCONTINUED | OUTPATIENT
Start: 2022-07-22 | End: 2022-07-28

## 2022-07-22 RX ORDER — POTASSIUM CHLORIDE 20 MEQ
20 PACKET (EA) ORAL ONCE
Refills: 0 | Status: COMPLETED | OUTPATIENT
Start: 2022-07-22 | End: 2022-07-22

## 2022-07-22 RX ORDER — METOPROLOL TARTRATE 50 MG
25 TABLET ORAL
Refills: 0 | Status: DISCONTINUED | OUTPATIENT
Start: 2022-07-22 | End: 2022-07-25

## 2022-07-22 RX ORDER — POTASSIUM CHLORIDE 20 MEQ
20 PACKET (EA) ORAL ONCE
Refills: 0 | Status: DISCONTINUED | OUTPATIENT
Start: 2022-07-22 | End: 2022-07-22

## 2022-07-22 RX ORDER — LOSARTAN POTASSIUM 100 MG/1
12.5 TABLET, FILM COATED ORAL AT BEDTIME
Refills: 0 | Status: DISCONTINUED | OUTPATIENT
Start: 2022-07-22 | End: 2022-07-24

## 2022-07-22 RX ADMIN — ENOXAPARIN SODIUM 50 MILLIGRAM(S): 100 INJECTION SUBCUTANEOUS at 05:17

## 2022-07-22 RX ADMIN — Medication 0.25 MILLIGRAM(S): at 16:17

## 2022-07-22 RX ADMIN — Medication 25 MILLIGRAM(S): at 05:18

## 2022-07-22 RX ADMIN — ENOXAPARIN SODIUM 50 MILLIGRAM(S): 100 INJECTION SUBCUTANEOUS at 17:52

## 2022-07-22 RX ADMIN — DAPAGLIFLOZIN 10 MILLIGRAM(S): 10 TABLET, FILM COATED ORAL at 16:17

## 2022-07-22 RX ADMIN — Medication 1 TABLET(S): at 13:36

## 2022-07-22 RX ADMIN — Medication 81 MILLIGRAM(S): at 13:37

## 2022-07-22 RX ADMIN — Medication 25 MILLIGRAM(S): at 13:37

## 2022-07-22 RX ADMIN — PIPERACILLIN AND TAZOBACTAM 25 GRAM(S): 4; .5 INJECTION, POWDER, LYOPHILIZED, FOR SOLUTION INTRAVENOUS at 13:37

## 2022-07-22 RX ADMIN — Medication 20 MILLIEQUIVALENT(S): at 17:53

## 2022-07-22 RX ADMIN — ATORVASTATIN CALCIUM 40 MILLIGRAM(S): 80 TABLET, FILM COATED ORAL at 21:12

## 2022-07-22 RX ADMIN — PIPERACILLIN AND TAZOBACTAM 25 GRAM(S): 4; .5 INJECTION, POWDER, LYOPHILIZED, FOR SOLUTION INTRAVENOUS at 05:17

## 2022-07-22 RX ADMIN — PIPERACILLIN AND TAZOBACTAM 25 GRAM(S): 4; .5 INJECTION, POWDER, LYOPHILIZED, FOR SOLUTION INTRAVENOUS at 21:17

## 2022-07-22 RX ADMIN — QUETIAPINE FUMARATE 25 MILLIGRAM(S): 200 TABLET, FILM COATED ORAL at 21:12

## 2022-07-22 RX ADMIN — LOSARTAN POTASSIUM 12.5 MILLIGRAM(S): 100 TABLET, FILM COATED ORAL at 21:12

## 2022-07-22 NOTE — CONSULT NOTE ADULT - TIME BILLING
discussion with family in regards to overall medical condition  and prognosis as well as treatment options. discussion with bedside RN and NP Agnes Joseph. management of symptoms like anxiety.
NSTEMI, Acute HFrEF, Afib RVR, Sepsis

## 2022-07-22 NOTE — CONSULT NOTE ADULT - SUBJECTIVE AND OBJECTIVE BOX
HPI: 89F with PMh as listed admitted 7/20 found on floor by family.       89 year old female with history of Dementia, Anxiety, HTN, HLD and Breast Cancer s/p Mastectomy + Chemo brought by EMS with altered mental status. As per daughter - Tami Rao, patient was doing well until yesterday. Last night, she had a fall and was brought to the hospital. She was evaluated and later was discharged home. She was agitated when she got home but after some time she went to sleep. Later during the morning, family found her on the floor. She was confused so she was brought to the hospital. Family noticed some shortness of breath yesterday which they were attributing to agitation. No fever, sick contacts or recent travel.  In ER, she was found to have Sepsis likely secondary to Pneumonia, she was given IVF and Zosyn/Vanco. Also found to have NSTEMI and PE, seen by Cardiology and was started on Heparin Infusion. Bedside ECHO with EF of 30%.  (20 Jul 2022 17:27)    PERTINENT PMH REVIEWED: Yes     PAST MEDICAL & SURGICAL HISTORY:  Dementia  HLD (hyperlipidemia)  HTN (hypertension)  Anxiety  Breast cancer  S/P mastectomy    SOCIAL HISTORY:                      Substance history:                    Admitted from:  home  [facility]                     Episcopalian/spirituality:                    Cultural concerns:                      Surrogate/HCP/Guardian: Phone#:    FAMILY HISTORY:  No pertinent family history in first degree relatives        Allergies    No Known Allergies    Intolerances        ADVANCE DIRECTIVES/TREATMENT PREFERENCES:  Full code, all aggressive measures desired   DNR/DNI - MOLST, continue all other medical treatments  DNR/DNI - MOLST, comfort measures only     Baseline ADLs (prior to admission):  Independent/ Dependent      Karnofsky/Palliative Performance Status Version 2:  %  http://npcrc.org/files/news/palliative_performance_scale_ppsv2.pdf    Present Symptoms:     Dyspnea: Mild Moderate Severe  Nausea/Vomiting: Yes No  Anxiety:  Yes No  Depression: Yes No  Fatigue: Yes No  Loss of appetite: Yes No  Constipation:     Pain:             Character-            Duration-            Effect-            Factors-            Frequency-            Location-            Severity-    Pain AD Score:  [email or web address] (press ctrl + left click to view)    Review of Systems: Reviewed                     Negative:                     Positive:  Unable to obtain due to poor mentation   All others negative    MEDICATIONS  (STANDING):  aspirin  chewable 81 milliGRAM(s) Oral daily  atorvastatin 40 milliGRAM(s) Oral at bedtime  dapagliflozin 10 milliGRAM(s) Oral daily  enoxaparin Injectable 50 milliGRAM(s) SubCutaneous every 12 hours  metoprolol tartrate 25 milliGRAM(s) Oral every 8 hours  multivitamin 1 Tablet(s) Oral daily  piperacillin/tazobactam IVPB.. 3.375 Gram(s) IV Intermittent every 8 hours  QUEtiapine 25 milliGRAM(s) Oral at bedtime    MEDICATIONS  (PRN):  acetaminophen     Tablet .. 650 milliGRAM(s) Oral every 6 hours PRN Temp greater or equal to 38C (100.4F), Mild Pain (1 - 3), Moderate Pain (4 - 6)  metoprolol tartrate Injectable 5 milliGRAM(s) IV Push every 6 hours PRN for sustain Afib >120s  ondansetron Injectable 4 milliGRAM(s) IV Push every 6 hours PRN Nausea and/or Vomiting      PHYSICAL EXAM:    Vital Signs Last 24 Hrs  T(C): 36.3 (22 Jul 2022 04:00), Max: 36.4 (21 Jul 2022 12:00)  T(F): 97.3 (22 Jul 2022 04:00), Max: 97.6 (21 Jul 2022 12:00)  HR: 76 (22 Jul 2022 06:00) (76 - 90)  BP: 105/62 (22 Jul 2022 06:00) (93/47 - 117/52)  BP(mean): 74 (22 Jul 2022 06:00) (59 - 82)  RR: 18 (22 Jul 2022 06:00) (16 - 18)  SpO2: 100% (22 Jul 2022 06:00) (96% - 100%)    Parameters below as of 22 Jul 2022 06:00  Patient On (Oxygen Delivery Method): nasal cannula  O2 Flow (L/min): 1      General: alert  oriented x ____ lethargic agitated delirious                  cachexia  nonverbal  coma    HEENT: normal  dry mouth  ET tube/trach    Lungs: comfortable tachypnea/labored breathing  excessive secretions    CV: normal  tachycardia    GI: normal  distended  tender  no BS               PEG/NG/OG tube  constipation  last BM:     : normal  incontinent  oliguria/anuria  hernandez    MSK: normal  weakness  edema             ambulatory  bedbound/wheelchair bound    Neuro: no focal deficits cognitive impairment dysphagia dysarthria hemiplegia     Skin: normal  pressure ulcers- Stage_____  no rash    LABS:                      13.4   18.39 )-----------( 198      ( 22 Jul 2022 04:55 )             41.3     07-22    137  |  97<L>  |  16.2  ----------------------------<  83  3.8   |  32.0<H>  |  0.53    Ca    7.8<L>      22 Jul 2022 04:55  Mg     2.0     07-22    TPro  5.8<L>  /  Alb  3.1<L>  /  TBili  0.5  /  DBili  x   /  AST  69<H>  /  ALT  55<H>  /  AlkPhos  50  07-22    PT/INR - ( 20 Jul 2022 16:20 )   PT: 17.1 sec;   INR: 1.47 ratio       PTT - ( 21 Jul 2022 19:11 )  PTT:78.8 sec    I&O's Summary    21 Jul 2022 07:01  -  22 Jul 2022 07:00  --------------------------------------------------------  IN: 170 mL / OUT: 850 mL / NET: -680 mL    RADIOLOGY & ADDITIONAL STUDIES:    < from: CT Abdomen and Pelvis w/ IV Cont (07.20.22 @ 14:29) >  IMPRESSION: Segmental right upper lobe pulmonary embolism.   Redemonstration of minimal right pneumothorax.  Small right pleural effusion again seen. Patchy infiltrates and nodular   densities most confluent in the left lower lobe again seen.  No acute pathology in the abdomen or pelvis  Results were discussed with the provider Johnna Turcios at 3:35 PM on   7/20/2022    --- End of Report ---      ELMER GOODWIN MD; Attending Radiologist  This document has been electronically signed. Jul 20 2022  3:37PM    < end of copied text >    < from: CT Angio Chest PE Protocol w/ IV Cont (07.20.22 @ 14:29) >    FINDINGS:  CHEST:  LUNGS AND LARGE AIRWAYS: Patent central airways. The lungs are remarkable   for somewhat patchy infiltrates in the lower lobes with more confluent   infiltrate in the left lower lobe. Nodular densities are seen in the   right middle lobe. Peripheral infiltrates are seenin the upper lobes.   There is bronchiectasis in the left apex. Minimal right pneumothorax is   again noted  PLEURA: Small right pleural effusion.  VESSELS: Atherosclerotic calcification of the aorta. Segmental pulmonary   embolism to the right upperlobe is seen on images 235 to 240 of series 5  HEART: Mild hepatomegaly. No pericardial effusion.  MEDIASTINUM AND NITO: No lymphadenopathy.  CHEST WALL AND LOWER NECK: Suggestion of small thyroid nodules.    ABDOMEN AND PELVIS:  LIVER: Within normallimits.  BILE DUCTS: Normal caliber.  GALLBLADDER: Cholecystectomy.  SPLEEN: Within normal limits.  PANCREAS: Within normal limits.  ADRENALS: Within normal limits.  KIDNEYS/URETERS: Unremarkable.    BLADDER: Within normal limits.  REPRODUCTIVE ORGANS: Small uterus is seen. No pelvic mass    BOWEL: No bowel obstruction. Appendix is not visualized. No evidence of   inflammation in the pericecal region.  PERITONEUM: No ascites.  VESSELS: Atherosclerotic changes.  RETROPERITONEUM/LYMPH NODES: No lymphadenopathy.  ABDOMINAL WALL: Within normal limits.  BONES: Degenerative changes. Pectus excavatum of the anterior chest wall    IMPRESSION: Segmental right upper lobe pulmonary embolism.   Redemonstration of minimal right pneumothorax.  Small right pleural effusion again seen. Patchy infiltrates and nodular   densities most confluent in the left lower lobe again seen.  No acute pathology in the abdomen or pelvis  Results were discussed with the provider Johnna Turcios at 3:35 PM on   7/20/2022    --- End of Report ---            ELMER GOODWIN MD; Attending Radiologist  This document has been electronically signed. Jul 20 2022  3:37PM    < end of copied text >       HPI: 89F with PMh as listed admitted 7/20 found on floor by family. In Ed found to have hypoxic respiratory failure 84% on room air, fever, CT chest revealed RUL segmental PE, patchy infiltrates, small right pleural effusion, minimal right pneumothorax. Bedside echo showed severely reduced global left systolic function ( EF 20-25%), cardiology called for elevated troponins/NSTEMI. Patient not a candidate for ischemic workup at this time. Palliative consulted in light of poor prognosis and help with goals of therapy.     PERTINENT PMH REVIEWED: Yes     PAST MEDICAL & SURGICAL HISTORY:  Dementia  HLD (hyperlipidemia)  HTN (hypertension)  Anxiety  Breast cancer S/P mastectomy + chemotherapy     SOCIAL HISTORY:                      Substance history:                    Admitted from:  home                     Orthodox/spirituality:                    Cultural concerns:                      Surrogate -  daughter Tami [phone]    FAMILY HISTORY:  No pertinent family history in first degree relatives    Allergies    No Known Allergies    ADVANCE DIRECTIVES/TREATMENT PREFERENCES:  Full code, all aggressive measures desired     Baseline ADLs (prior to admission):  Independent/ Dependent      Karnofsky/Palliative Performance Status Version 2:  %  http://npcrc.org/files/news/palliative_performance_scale_ppsv2.pdf    Present Symptoms:     Dyspnea: Mild Moderate Severe  Nausea/Vomiting: Yes No  Anxiety:  Yes No  Depression: Yes No  Fatigue: Yes No  Loss of appetite: Yes No  Constipation:     Pain:             Character-            Duration-            Effect-            Factors-            Frequency-            Location-            Severity-    Pain AD Score:  [email or web address] (press ctrl + left click to view)    Review of Systems: Reviewed                     Negative:                     Positive:  Unable to obtain due to poor mentation   All others negative    MEDICATIONS  (STANDING):  aspirin  chewable 81 milliGRAM(s) Oral daily  atorvastatin 40 milliGRAM(s) Oral at bedtime  dapagliflozin 10 milliGRAM(s) Oral daily  enoxaparin Injectable 50 milliGRAM(s) SubCutaneous every 12 hours  metoprolol tartrate 25 milliGRAM(s) Oral every 8 hours  multivitamin 1 Tablet(s) Oral daily  piperacillin/tazobactam IVPB.. 3.375 Gram(s) IV Intermittent every 8 hours  QUEtiapine 25 milliGRAM(s) Oral at bedtime    MEDICATIONS  (PRN):  acetaminophen     Tablet .. 650 milliGRAM(s) Oral every 6 hours PRN Temp greater or equal to 38C (100.4F), Mild Pain (1 - 3), Moderate Pain (4 - 6)  metoprolol tartrate Injectable 5 milliGRAM(s) IV Push every 6 hours PRN for sustain Afib >120s  ondansetron Injectable 4 milliGRAM(s) IV Push every 6 hours PRN Nausea and/or Vomiting      PHYSICAL EXAM:    Vital Signs Last 24 Hrs  T(C): 36.3 (22 Jul 2022 04:00), Max: 36.4 (21 Jul 2022 12:00)  T(F): 97.3 (22 Jul 2022 04:00), Max: 97.6 (21 Jul 2022 12:00)  HR: 76 (22 Jul 2022 06:00) (76 - 90)  BP: 105/62 (22 Jul 2022 06:00) (93/47 - 117/52)  BP(mean): 74 (22 Jul 2022 06:00) (59 - 82)  RR: 18 (22 Jul 2022 06:00) (16 - 18)  SpO2: 100% (22 Jul 2022 06:00) (96% - 100%)    Parameters below as of 22 Jul 2022 06:00  Patient On (Oxygen Delivery Method): nasal cannula  O2 Flow (L/min): 1      General: alert  oriented x ____ lethargic agitated delirious                  cachexia  nonverbal  coma    HEENT: normal  dry mouth  ET tube/trach    Lungs: comfortable tachypnea/labored breathing  excessive secretions    CV: normal  tachycardia    GI: normal  distended  tender  no BS               PEG/NG/OG tube  constipation  last BM:     : normal  incontinent  oliguria/anuria  hernandez    MSK: normal  weakness  edema             ambulatory  bedbound/wheelchair bound    Neuro: no focal deficits cognitive impairment dysphagia dysarthria hemiplegia     Skin: normal  pressure ulcers- Stage_____  no rash    LABS:                      13.4   18.39 )-----------( 198      ( 22 Jul 2022 04:55 )             41.3     07-22    137  |  97<L>  |  16.2  ----------------------------<  83  3.8   |  32.0<H>  |  0.53    Ca    7.8<L>      22 Jul 2022 04:55  Mg     2.0     07-22    TPro  5.8<L>  /  Alb  3.1<L>  /  TBili  0.5  /  DBili  x   /  AST  69<H>  /  ALT  55<H>  /  AlkPhos  50  07-22    PT/INR - ( 20 Jul 2022 16:20 )   PT: 17.1 sec;   INR: 1.47 ratio       PTT - ( 21 Jul 2022 19:11 )  PTT:78.8 sec    I&O's Summary    21 Jul 2022 07:01  -  22 Jul 2022 07:00  --------------------------------------------------------  IN: 170 mL / OUT: 850 mL / NET: -680 mL    RADIOLOGY & ADDITIONAL STUDIES:    < from: CT Abdomen and Pelvis w/ IV Cont (07.20.22 @ 14:29) >  IMPRESSION: Segmental right upper lobe pulmonary embolism.   Redemonstration of minimal right pneumothorax.  Small right pleural effusion again seen. Patchy infiltrates and nodular   densities most confluent in the left lower lobe again seen.  No acute pathology in the abdomen or pelvis  Results were discussed with the provider Johnna Turcios at 3:35 PM on   7/20/2022    --- End of Report ---      ELMER GOODWIN MD; Attending Radiologist  This document has been electronically signed. Jul 20 2022  3:37PM    < end of copied text >    < from: CT Angio Chest PE Protocol w/ IV Cont (07.20.22 @ 14:29) >    FINDINGS:  CHEST:  LUNGS AND LARGE AIRWAYS: Patent central airways. The lungs are remarkable   for somewhat patchy infiltrates in the lower lobes with more confluent   infiltrate in the left lower lobe. Nodular densities are seen in the   right middle lobe. Peripheral infiltrates are seenin the upper lobes.   There is bronchiectasis in the left apex. Minimal right pneumothorax is   again noted  PLEURA: Small right pleural effusion.  VESSELS: Atherosclerotic calcification of the aorta. Segmental pulmonary   embolism to the right upperlobe is seen on images 235 to 240 of series 5  HEART: Mild hepatomegaly. No pericardial effusion.  MEDIASTINUM AND NITO: No lymphadenopathy.  CHEST WALL AND LOWER NECK: Suggestion of small thyroid nodules.    ABDOMEN AND PELVIS:  LIVER: Within normallimits.  BILE DUCTS: Normal caliber.  GALLBLADDER: Cholecystectomy.  SPLEEN: Within normal limits.  PANCREAS: Within normal limits.  ADRENALS: Within normal limits.  KIDNEYS/URETERS: Unremarkable.    BLADDER: Within normal limits.  REPRODUCTIVE ORGANS: Small uterus is seen. No pelvic mass    BOWEL: No bowel obstruction. Appendix is not visualized. No evidence of   inflammation in the pericecal region.  PERITONEUM: No ascites.  VESSELS: Atherosclerotic changes.  RETROPERITONEUM/LYMPH NODES: No lymphadenopathy.  ABDOMINAL WALL: Within normal limits.  BONES: Degenerative changes. Pectus excavatum of the anterior chest wall    IMPRESSION: Segmental right upper lobe pulmonary embolism.   Redemonstration of minimal right pneumothorax.  Small right pleural effusion again seen. Patchy infiltrates and nodular   densities most confluent in the left lower lobe again seen.  No acute pathology in the abdomen or pelvis  Results were discussed with the provider Johnna Turcios at 3:35 PM on   7/20/2022    --- End of Report ---    ELMER GOODWIN MD; Attending Radiologist  This document has been electronically signed. Jul 20 2022  3:37PM    < end of copied text >    < from: TTE Echo Complete w/ Contrast w/ Doppler (07.21.22 @ 11:50) >    Summary:   1. Left ventricular ejection fraction, by visual estimation, is 20 to   25%.   2. Severely decreased global left ventricular systolic function.   3. Mildly increased left ventricular internal cavity size.   4. Spectral Doppler shows impaired relaxation pattern of left   ventricular myocardial filling (Grade I diastolic dysfunction).   5. Normal right ventricular size and function, inadequate estimation of   RVSP.   6. Mildly enlarged left atrium.   7. Mildly enlarged right atrium.   8. Mild mitral valve regurgitation.   9. Mild tricuspid regurgitation.  10. Moderate aortic valve stenosis.  11. Mild aortic regurgitation.  12. There is mild aortic root calcification.  13. There is no evidence of pericardial effusion.    Flynn Farris DO Electronically signed on 7/21/2022 at 3:59:22 PM    < end of copied text >       HPI: 89F with PMh as listed admitted 7/20 found on floor by family. In Ed found to have hypoxic respiratory failure 84% on room air, fever, CT chest revealed RUL segmental PE, patchy infiltrates, small right pleural effusion, minimal right pneumothorax. Bedside echo showed severely reduced global left systolic function ( EF 20-25%), cardiology called for elevated troponins/NSTEMI. Patient not a candidate for ischemic workup at this time. Palliative consulted in light of poor prognosis and help with goals of therapy.     PERTINENT PMH REVIEWED: Yes     PAST MEDICAL & SURGICAL HISTORY:  Dementia  HLD (hyperlipidemia)  HTN (hypertension)  Anxiety  Breast cancer S/P mastectomy over 10 years ago was considered to be in remission     SOCIAL HISTORY:                      Substance history:  non smoker                     Admitted from:  home                     Church/spirituality:                    Cultural concerns: none                       HCP -  son Armaan and daughter Tami [phone]    FAMILY HISTORY:  No pertinent family history in first degree relatives    Allergies    No Known Allergies    ADVANCE DIRECTIVES/TREATMENT PREFERENCES:  Full code, all aggressive measures desired --> now DNR and DNI after this consult     Baseline ADLs (prior to admission):  Independent/ Dependent      Karnofsky/Palliative Performance Status Version 2:  30 - 40 %  http://npcrc.org/files/news/palliative_performance_scale_ppsv2.pdf    Present Symptoms:     Dyspnea: none   Nausea/Vomiting: No  Anxiety:  yes intermittent   Depression: no   Fatigue: none   Loss of appetite: none   Constipation: none     Pain: none             Character-            Duration-            Effect-            Factors-            Frequency-            Location-            Severity-    Pain AD Score:  [email or web address] (press ctrl + left click to view)    Review of Systems: Reviewed                     Negative: no chest pain                    All others negative    MEDICATIONS  (STANDING):  aspirin  chewable 81 milliGRAM(s) Oral daily  atorvastatin 40 milliGRAM(s) Oral at bedtime  dapagliflozin 10 milliGRAM(s) Oral daily  enoxaparin Injectable 50 milliGRAM(s) SubCutaneous every 12 hours  metoprolol tartrate 25 milliGRAM(s) Oral every 8 hours  multivitamin 1 Tablet(s) Oral daily  piperacillin/tazobactam IVPB.. 3.375 Gram(s) IV Intermittent every 8 hours  QUEtiapine 25 milliGRAM(s) Oral at bedtime    MEDICATIONS  (PRN):  acetaminophen     Tablet .. 650 milliGRAM(s) Oral every 6 hours PRN Temp greater or equal to 38C (100.4F), Mild Pain (1 - 3), Moderate Pain (4 - 6)  metoprolol tartrate Injectable 5 milliGRAM(s) IV Push every 6 hours PRN for sustain Afib >120s  ondansetron Injectable 4 milliGRAM(s) IV Push every 6 hours PRN Nausea and/or Vomiting    PHYSICAL EXAM:    Vital Signs Last 24 Hrs  T(C): 36.3 (22 Jul 2022 04:00), Max: 36.4 (21 Jul 2022 12:00)  T(F): 97.3 (22 Jul 2022 04:00), Max: 97.6 (21 Jul 2022 12:00)  HR: 76 (22 Jul 2022 06:00) (76 - 90)  BP: 105/62 (22 Jul 2022 06:00) (93/47 - 117/52)  BP(mean): 74 (22 Jul 2022 06:00) (59 - 82)  RR: 18 (22 Jul 2022 06:00) (16 - 18)  SpO2: 100% (22 Jul 2022 06:00) (96% - 100%)    Parameters below as of 22 Jul 2022 06:00  Patient On (Oxygen Delivery Method): nasal cannula  O2 Flow (L/min): 1    General: alert  sitting in bedside chair     HEENT: normal     Lungs: comfortable     CV: normal      GI: normal      : normal      MSK: weakness      Neuro: no focal deficits     Skin: no rash    LABS:                      13.4   18.39 )-----------( 198      ( 22 Jul 2022 04:55 )             41.3     07-22    137  |  97<L>  |  16.2  ----------------------------<  83  3.8   |  32.0<H>  |  0.53    Ca    7.8<L>      22 Jul 2022 04:55  Mg     2.0     07-22    TPro  5.8<L>  /  Alb  3.1<L>  /  TBili  0.5  /  DBili  x   /  AST  69<H>  /  ALT  55<H>  /  AlkPhos  50  07-22    PT/INR - ( 20 Jul 2022 16:20 )   PT: 17.1 sec;   INR: 1.47 ratio       PTT - ( 21 Jul 2022 19:11 )  PTT:78.8 sec    I&O's Summary    21 Jul 2022 07:01  -  22 Jul 2022 07:00  --------------------------------------------------------  IN: 170 mL / OUT: 850 mL / NET: -680 mL    RADIOLOGY & ADDITIONAL STUDIES:    < from: CT Abdomen and Pelvis w/ IV Cont (07.20.22 @ 14:29) >  IMPRESSION: Segmental right upper lobe pulmonary embolism.   Redemonstration of minimal right pneumothorax.  Small right pleural effusion again seen. Patchy infiltrates and nodular   densities most confluent in the left lower lobe again seen.  No acute pathology in the abdomen or pelvis  Results were discussed with the provider Johnna Turcios at 3:35 PM on   7/20/2022    --- End of Report ---      ELMER GOODWIN MD; Attending Radiologist  This document has been electronically signed. Jul 20 2022  3:37PM    < end of copied text >    < from: CT Angio Chest PE Protocol w/ IV Cont (07.20.22 @ 14:29) >    FINDINGS:  CHEST:  LUNGS AND LARGE AIRWAYS: Patent central airways. The lungs are remarkable   for somewhat patchy infiltrates in the lower lobes with more confluent   infiltrate in the left lower lobe. Nodular densities are seen in the   right middle lobe. Peripheral infiltrates are seenin the upper lobes.   There is bronchiectasis in the left apex. Minimal right pneumothorax is   again noted  PLEURA: Small right pleural effusion.  VESSELS: Atherosclerotic calcification of the aorta. Segmental pulmonary   embolism to the right upperlobe is seen on images 235 to 240 of series 5  HEART: Mild hepatomegaly. No pericardial effusion.  MEDIASTINUM AND NITO: No lymphadenopathy.  CHEST WALL AND LOWER NECK: Suggestion of small thyroid nodules.    ABDOMEN AND PELVIS:  LIVER: Within normallimits.  BILE DUCTS: Normal caliber.  GALLBLADDER: Cholecystectomy.  SPLEEN: Within normal limits.  PANCREAS: Within normal limits.  ADRENALS: Within normal limits.  KIDNEYS/URETERS: Unremarkable.    BLADDER: Within normal limits.  REPRODUCTIVE ORGANS: Small uterus is seen. No pelvic mass    BOWEL: No bowel obstruction. Appendix is not visualized. No evidence of   inflammation in the pericecal region.  PERITONEUM: No ascites.  VESSELS: Atherosclerotic changes.  RETROPERITONEUM/LYMPH NODES: No lymphadenopathy.  ABDOMINAL WALL: Within normal limits.  BONES: Degenerative changes. Pectus excavatum of the anterior chest wall    IMPRESSION: Segmental right upper lobe pulmonary embolism.   Redemonstration of minimal right pneumothorax.  Small right pleural effusion again seen. Patchy infiltrates and nodular   densities most confluent in the left lower lobe again seen.  No acute pathology in the abdomen or pelvis  Results were discussed with the provider Johnna Turcios at 3:35 PM on   7/20/2022    --- End of Report ---    ELMER GOODWIN MD; Attending Radiologist  This document has been electronically signed. Jul 20 2022  3:37PM    < end of copied text >    < from: TTE Echo Complete w/ Contrast w/ Doppler (07.21.22 @ 11:50) >    Summary:   1. Left ventricular ejection fraction, by visual estimation, is 20 to   25%.   2. Severely decreased global left ventricular systolic function.   3. Mildly increased left ventricular internal cavity size.   4. Spectral Doppler shows impaired relaxation pattern of left   ventricular myocardial filling (Grade I diastolic dysfunction).   5. Normal right ventricular size and function, inadequate estimation of   RVSP.   6. Mildly enlarged left atrium.   7. Mildly enlarged right atrium.   8. Mild mitral valve regurgitation.   9. Mild tricuspid regurgitation.  10. Moderate aortic valve stenosis.  11. Mild aortic regurgitation.  12. There is mild aortic root calcification.  13. There is no evidence of pericardial effusion.    Flynn Farris DO Electronically signed on 7/21/2022 at 3:59:22 PM    < end of copied text >

## 2022-07-22 NOTE — PROGRESS NOTE ADULT - SUBJECTIVE AND OBJECTIVE BOX
[place] [facility]                                                               Office: [address] [address]                                                              Telephone: [phone]. Fax:[phone]                                                                             PROGRESS NOTE  Reason for follow up: HFrEF, Afib, PE  Overnight: No new events.   Update: feeling well, sitting up at bedside not require supplemental oxygen, denies complains, family at bedside      Review of symptoms:   Cardiac:  No chest pain. No dyspnea. No palpitations.  Respiratory: No cough. No dyspnea  Gastrointestinal: No diarrhea. No abdominal pain. No bleeding.     Past medical history: No updates.   	  Vital Signs Last 24 Hrs  T(C): 36.3 (07-22-22 @ 04:00), Max: 36.4 (07-21-22 @ 16:20)  T(F): 97.3 (07-22-22 @ 04:00), Max: 97.5 (07-21-22 @ 16:20)  HR: 88 (07-22-22 @ 12:00) (76 - 90)  BP: 105/49 (07-22-22 @ 12:00) (101/50 - 117/52)  BP(mean): 74 (07-22-22 @ 06:00) (62 - 74)  RR: 18 (07-22-22 @ 12:00) (16 - 18)  SpO2: 98% (07-22-22 @ 12:00) (96% - 100%)  I&O's Summary    21 Jul 2022 07:01  -  22 Jul 2022 07:00  --------------------------------------------------------  IN: 170 mL / OUT: 850 mL / NET: -680 mL    22 Jul 2022 07:01  -  22 Jul 2022 14:51  --------------------------------------------------------  IN: 0 mL / OUT: 150 mL / NET: -150 mL          PHYSICAL EXAM:  Appearance: Comfortable. No acute distress, thin cachetic   HEENT:  Head and neck: Atraumatic. Normocephalic.  Normal oral mucosa, PERRL, Neck is supple. Prominent EJV  Neurologic: A&Ox 3, no focal deficits. EOMI, Cranial nerves are intact.  Cardiovascular: Normal S1 S2, No murmur, rubs/gallops. No JVD, No edema  Respiratory: Reduced breath sounds basilar   Gastrointestinal:  Soft, Non-tender, + BS  Lower Extremities: No edema  Psychiatry: Patient is calm. No agitation. Mood & affect appropriate  Skin: No rashes/ecchymoses/cyanosis/ulcers visualized on the face, hands or feet.      CURRENT MEDICATIONS:  MEDICATIONS  (STANDING):  aspirin  chewable 81 milliGRAM(s) Oral daily  atorvastatin 40 milliGRAM(s) Oral at bedtime  dapagliflozin 10 milliGRAM(s) Oral daily  enoxaparin Injectable 50 milliGRAM(s) SubCutaneous every 12 hours  metoprolol tartrate 25 milliGRAM(s) Oral every 8 hours  multivitamin 1 Tablet(s) Oral daily  piperacillin/tazobactam IVPB.. 3.375 Gram(s) IV Intermittent every 8 hours  QUEtiapine 25 milliGRAM(s) Oral at bedtime    MEDICATIONS  (PRN):  acetaminophen     Tablet .. 650 milliGRAM(s) Oral every 6 hours PRN Temp greater or equal to 38C (100.4F), Mild Pain (1 - 3), Moderate Pain (4 - 6)  metoprolol tartrate Injectable 5 milliGRAM(s) IV Push every 6 hours PRN for sustain Afib >120s  ondansetron Injectable 4 milliGRAM(s) IV Push every 6 hours PRN Nausea and/or Vomiting      DIAGNOSTIC TESTING:  [ ] Echocardiogram:   < from: TTE Echo Complete w/ Contrast w/ Doppler (07.21.22 @ 11:50) >  Summary:   1. Left ventricular ejection fraction, by visual estimation, is 20 to   25%.   2. Severely decreased global left ventricular systolic function.   3. Mildly increased left ventricular internal cavity size.   4. Spectral Doppler shows impaired relaxation pattern of left   ventricular myocardial filling (Grade I diastolic dysfunction).   5. Normal right ventricular size and function, inadequate estimation of   RVSP.   6. Mildly enlarged left atrium.   7. Mildly enlarged right atrium.   8. Mild mitral valve regurgitation.   9. Mild tricuspid regurgitation.  10. Moderate aortic valve stenosis.  11. Mild aortic regurgitation.  12. There is mild aortic root calcification.  13. There is no evidence of pericardial effusion.    < end of copied text >    [ ]  Catheterization:  [ ] Stress Test:      Labs:                        13.4   18.39 )-----------( 198      ( 22 Jul 2022 04:55 )             41.3     07-22    137  |  97<L>  |  16.2  ----------------------------<  83  3.8   |  32.0<H>  |  0.53    Ca    7.8<L>      22 Jul 2022 04:55  Mg     2.0     07-22    TPro  5.8<L>  /  Alb  3.1<L>  /  TBili  0.5  /  DBili  x   /  AST  69<H>  /  ALT  55<H>  /  AlkPhos  50  07-22 21 Jul 2022 05:43 Troponin 0.16 ng/mL / Creatine Kinase 530 U/L /  CKMB 16.7 ng/mL / CPK Mass Assay % x       20 Jul 2022 20:20 Troponin 0.20 ng/mL / Creatine Kinase 657 U/L /  CKMB 12.4 ng/mL / CPK Mass Assay % x       20 Jul 2022 11:38 Troponin 0.31 ng/mL / Creatine Kinase 334 U/L /  CKMB 11.4 ng/mL / CPK Mass Assay % x          Serum Pro-Brain Natriuretic Peptide: 82168 pg/mL (07-20-22 @ 11:38)  Serum Pro-Brain Natriuretic Peptide: 502 pg/mL (07-28-20 @ 13:19)    Cholesterol 101 mg/dL; Direct LDL --; HDL Cholesterol, Serum 56 mg/dL; HDL/ Total Cholesterol Ratio Measurement --; Total Cholesterol/ HDL Ratio Measurement --; Triglycerides, Serum 65 mg/dL  A1C with Estimated Average Glucose Result: 6.2 % (07-21-22 @ 05:43)      TELEMETRY: sinus 80-90s with PACs

## 2022-07-22 NOTE — CHART NOTE - NSCHARTNOTEFT_GEN_A_CORE
Called by RN for unwitnessed fall. Patient AxOx1-2 at baseline, patient found sitting up on her buttocks in front of chair  Patient states she needed to use the bathroom   [employer]  used   Patient denies any head trauma or pain or discomfort at this time.     BP: 99/51  HR: 85  O2: 95    Neuro: AxOx1-2, patient   PERLL, CN 2-12 grossly intact, moves all extremities without difficulty, speech clear  Head: NC/AT, no evidence of trauma, no lumps, bumps or ecchymotic areas  Neck: supple, non-tender  Back: non-tender, no evidence of trauma  Chest: non-tender chest wall and clavicles, no ecchymosis, decreased breath sounds b/l, no wheezing or rhonchi   Hips: non-tender, no leg length discrepancy, weight bearing not assessed due to weakness  Coccyx: non-tender, no evidence of trauma  Extremities: non-tender over all long bones and joints, knees non-tender, no erythema, no evidence of trauma, full range of motion    No obvious injury, atraumatic event  Call bell placed in patient's hand and patient instructed not to get out of bed  Constant observation ordered  STAT CT head ordered   Daughter Tami costello   Continue to monitor, notify PA of any changes in patient status Called by RN for unwitnessed fall. Patient AxOx1-2 at baseline, patient found sitting up on her buttocks in front of chair  Patient states she needed to use the bathroom   [employer]  used   Patient denies any head trauma or pain or discomfort at this time.     BP: 99/51  HR: 85  O2: 95    Neuro: AxOx1-2, patient   PERLL, CN 2-12 grossly intact, moves all extremities without difficulty, speech clear  Head: NC/AT, no evidence of trauma, no lumps, bumps or ecchymotic areas  Neck: supple, non-tender  Back: non-tender, no evidence of trauma  Chest: non-tender chest wall and clavicles, no ecchymosis, decreased breath sounds b/l, no wheezing or rhonchi   Hips: non-tender, no leg length discrepancy, weight bearing not assessed due to weakness  Coccyx: non-tender, no evidence of trauma  Extremities: non-tender over all long bones and joints, knees non-tender, no erythema, no evidence of trauma, full range of motion    No obvious injury, atraumatic event  Call bell placed in patient's hand and patient instructed not to get out of bed  Constant observation ordered  STAT CT head ordered   Daughter Tami costello   Continue to monitor, notify PA of any changes in patient status    Addendum  CT head prelim  - No acute intracranial hemorrhage or mass effect

## 2022-07-22 NOTE — PROGRESS NOTE ADULT - SUBJECTIVE AND OBJECTIVE BOX
Hypoxemia    HPI:  89 year old female with history of Dementia, Anxiety, HTN, HLD and Breast Cancer s/p Mastectomy + Chemo brought by EMS with altered mental status. As per daughter - Tami Rao, patient was doing well until yesterday. Last night, she had a fall and was brought to the hospital. She was evaluated and later was discharged home. She was agitated when she got home but after some time she went to sleep. Later during the morning, family found her on the floor. She was confused so she was brought to the hospital. Family noticed some shortness of breath yesterday which they were attributing to agitation. No fever, sick contacts or recent travel.  In ER, she was found to have Sepsis likely secondary to Pneumonia, she was given IVF and Zosyn/Vanco. Also found to have NSTEMI and PE, seen by Cardiology and was started on Heparin Infusion. Bedside ECHO with EF of 30%.  (20 Jul 2022 17:27)    Interval History:  Patient was seen and examined at bedside around 9:45 am.  Awake and alert but confused.    Denies chest pain, palpitations, shortness of breath, headache, dizziness, visual symptoms, nausea, vomiting or abdominal pain.    ROS:  As per interval history otherwise unremarkable.    PHYSICAL EXAM:  Vital Signs   T(C): 36.3 (22 Jul 2022 04:00), Max: 36.4 (21 Jul 2022 16:20)  T(F): 97.3 (22 Jul 2022 04:00), Max: 97.5 (21 Jul 2022 16:20)  HR: 88 (22 Jul 2022 12:00) (76 - 90)  BP: 105/49 (22 Jul 2022 12:00) (101/50 - 117/52)  BP(mean): 74 (22 Jul 2022 06:00) (62 - 74)  RR: 18 (22 Jul 2022 12:00) (16 - 18)  SpO2: 98% (22 Jul 2022 12:00) (96% - 100%)  General: Elderly female sitting in bed comfortably. No acute distress  HEENT: PERRLA. EOMI. Clear conjunctivae. Moist mucus membrane  Neck: Supple.   Chest: Good air entry. Fine bibasilar crackles. No wheezing or rhonchi. No chest wall tenderness.  Heart: Normal S1 & S2 with RRR.   Abdomen: Soft. Non-tender. Non-distended. + BS  Ext: No pedal edema. No calf tenderness. Chronic skin changes.   Neuro: Awake and alert but not completely oriented. No focal deficit. Speech clear.   Skin: Warm and Dry  Psychiatry: Restless     I&O's Summary  21 Jul 2022 07:01  -  22 Jul 2022 07:00  --------------------------------------------------------  IN: 170 mL / OUT: 850 mL / NET: -680 mL    22 Jul 2022 07:01  -  22 Jul 2022 15:21  --------------------------------------------------------  IN: 0 mL / OUT: 150 mL / NET: -150 mL    LABS:                        13.4   18.39 )-----------( 198      ( 22 Jul 2022 04:55 )             41.3     07-22    137  |  97<L>  |  16.2  ----------------------------<  83  3.8   |  32.0<H>  |  0.53    Ca    7.8<L>      22 Jul 2022 04:55  Mg     2.0     07-22    TPro  5.8<L>  /  Alb  3.1<L>  /  TBili  0.5  /  DBili  x   /  AST  69<H>  /  ALT  55<H>  /  AlkPhos  50  07-22    PT/INR - ( 20 Jul 2022 16:20 )   PT: 17.1 sec;   INR: 1.47 ratio       PTT - ( 21 Jul 2022 19:11 )  PTT:78.8 sec  CARDIAC MARKERS ( 21 Jul 2022 05:43 )  x     / 0.16 ng/mL / 530 U/L / x     / 16.7 ng/mL  CARDIAC MARKERS ( 20 Jul 2022 20:20 )  x     / 0.20 ng/mL / 657 U/L / x     / 12.4 ng/mL    Blood Culture: 07-20 @ 11:44  Organism --  Gram Stain Blood -- Gram Stain --  Specimen Source .Blood Blood-Peripheral  Culture-Blood --    07-20 @ 11:38  Organism --  Gram Stain Blood -- Gram Stain --  Specimen Source .Blood Blood-Peripheral  Culture-Blood --    RADIOLOGY & ADDITIONAL STUDIES:  CT Angio Chest PE Protocol w/ IV Cont (07.20.22 @ 14:29)    ACC: [id number] [facility]  [facility]                        ACC: [id number] EXAM:  CT ABDOMEN AND PELVIS IC         Segmental right upper lobe pulmonary embolism.   Redemonstration of minimal right pneumothorax.  Small right pleural effusion again seen. Patchy infiltrates and nodular densities most confluent in the left lower lobe again seen.  No acute pathology in the abdomen or pelvis    TTE Echo Complete w/ Contrast w/ Doppler (07.21.22 @ 11:50)    1. Left ventricular ejection fraction, by visual estimation, is 20 to 25%.   2. Severely decreased global left ventricular systolic function.   3. Mildly increased left ventricular internal cavity size.   4. Spectral Doppler shows impaired relaxation pattern of left ventricular myocardial filling (Grade I diastolic dysfunction).   5. Normal right ventricular size and function, inadequate estimation of RVSP.   6. Mildly enlarged left atrium.   7. Mildly enlarged right atrium.   8. Mild mitral valve regurgitation.   9. Mild tricuspid regurgitation.  10. Moderate aortic valve stenosis.  11. Mild aortic regurgitation.  12. There is mild aortic root calcification.  13. There is no evidence of pericardial effusion.    MEDICATIONS  (STANDING):  aspirin  chewable 81 milliGRAM(s) Oral daily  atorvastatin 40 milliGRAM(s) Oral at bedtime  dapagliflozin 10 milliGRAM(s) Oral daily  enoxaparin Injectable 50 milliGRAM(s) SubCutaneous every 12 hours  losartan 12.5 milliGRAM(s) Oral at bedtime  metoprolol tartrate 25 milliGRAM(s) Oral two times a day  multivitamin 1 Tablet(s) Oral daily  piperacillin/tazobactam IVPB.. 3.375 Gram(s) IV Intermittent every 8 hours  QUEtiapine 25 milliGRAM(s) Oral at bedtime    MEDICATIONS  (PRN):  acetaminophen     Tablet .. 650 milliGRAM(s) Oral every 6 hours PRN Temp greater or equal to 38C (100.4F), Mild Pain (1 - 3), Moderate Pain (4 - 6)  ALPRAZolam 0.25 milliGRAM(s) Oral every 8 hours PRN anxiety  metoprolol tartrate Injectable 5 milliGRAM(s) IV Push every 6 hours PRN for sustain Afib >120s  ondansetron Injectable 4 milliGRAM(s) IV Push every 6 hours PRN Nausea and/or Vomiting

## 2022-07-22 NOTE — CONSULT NOTE ADULT - CONVERSATION DETAILS
discussion held with patient, , and daughter Tami at bedside. Patient unable to make decisions and  defers to daughter Tami. Son Armaan and daughter Tami are patients HCPs. Explained to Tami overall condition, prognosis, and treatment options. Discussed acute heart failure, heart attack, blood clot in lungs, on top of moms existing dementia and other comorbidities giving her an overall poorer prognosis. Discussed that she is unlikely to be a candidate for further workup or invasive measures for all these things but that we can continue conservative treatments. Discussed options moving forward and advanced care directives. Living will and proxy on chart, patient would not want heroic measures at end of life such as CPR or intubation. Also discussed options of home hospice given all we discussed and daughter Tami is receptive to this option to hear more information and further discuss with family particularly her brother Armaan.

## 2022-07-22 NOTE — GOALS OF CARE CONVERSATION - ADVANCED CARE PLANNING - CONVERSATION DETAILS
Care manager note: Chart reviewed.  Case discussed with interdisciplinary team as ongoing goals of care discussion held with family and interest in home hospice noted.  This writer outreached patient's daughter Tami Rao ([phone]), hospice philosophy and home hospice services reviewed and daughter confirmed interest.  Noted that she has to speak with her brother about specifics but agreeable to sending home hospice referral as patient anticipated for DC home on Mon, 7/25.  Per daughter, family will assume care for patient but are interested in hospice HHAs if approved.  Address on FS confirmed.  Daughter requesting referral be sent to all local agencies to see who can facilitate the quickest.  Referral sent at this time.  All questions answered.  Will continue to follow.

## 2022-07-22 NOTE — PROGRESS NOTE ADULT - ASSESSMENT
89F h/o mild dementia (per family still able to do all ADLs), anxiety, HTN, HLD, bilateral breast cancer (s/p resection, radiation and chemo twice, last 10yrs ago), no known prior cardiac history no outpatient cardiologist, was seen in the ER after fall and discharged home without blood work, returned next day afternoon with recurrent falls and worsening mental status, noted fever 103 and leukocytosis 24K, serum lactate 4.6 given 500m IVF, also pBNP 31K and Trop 0.31, newly dx Afib RVR with EKG with lateral T-wave abnormality, CTA found segmental PE, pulmonary infiltrates      Acute HFrEF  -TTE with severe cardiomyopathy LV EF 25%, suspect chronicity to underlying severe cardiomyopathy with minimal Troponin elevation, I have had an extensive discussion with patient's family/daughter Tami given her current state with unexplained severe cachexia/recurrent falls/sepsis she would be at high risk for complication from cath/PCI, recommend medical management for now and reassess candidacy for ischemic workup as outpatient.   -continue metoprolol 25mg change to BID, low SBP not able to tolerate GDMT with ARNi, will trial low dose losartan 12.5mg, continue Farxiga 10mg, defer maintenance diuretic for now as was hypotensive and required IVF bolus the night prior    pAfib RVR  -currently in sinus, continue low dose metoprolol, if BP cannot tolerate would add low dose digoxin 0.125mg daily  -continue anticoagulation, change to DOAC on discharge    Acute segmental PE  -on LMWH, change to Xarelto or Eliquis VTE dosing on discharge    Sepsis  -WBC downtrending, blood culture negative    Unexplained cachexia, frailty recurrent falls  -continue nutrition support       -overall prognosis can be guarded given extensive comorbidities, discussed with patient's daughter (Tami) to address goals of care, both she and her brother are HCP, palliative consult.       Flynn Cason DO, [facility]  Faculty Non-Invasive Cardiologist  [phone].

## 2022-07-22 NOTE — PROGRESS NOTE ADULT - ASSESSMENT
89 year old female with history of Dementia, Anxiety, HTN, HLD and Breast Cancer s/p Mastectomy + Chemo brought by EMS with altered mental status. As per daughter - Tami Rao, patient was doing well until yesterday. Last night, she had a fall and was brought to the hospital. She was evaluated and later was discharged home. She was agitated when she got home but after some time she went to sleep. Later during the morning, family found her on the floor. She was confused so she was brought to the hospital. Family noticed some shortness of breath yesterday which they were attributing to agitation. No fever, sick contacts or recent travel.  In ER, she was found to have Sepsis likely secondary to Pneumonia, she was given IVF and Zosyn/Vanco. Also found to have NSTEMI and PE, seen by Cardiology and was started on Heparin Infusion. Bedside ECHO with EF of 30%.     1) Sepsis   - Likely secondary to Aspiration Pneumonia   - Cultures negative   - s/p Zosyn and Vanco in ER  - Continue Zosyn  - If no improvement, will consider ID eval    2) Dysphagia  - Puree diet only, no liquids  - Speech therapy following     3) NSTEMI  - H/O HTN and HLD  - ECHO as above   - Heparin Infusion was switched to FD Lovenox   - Started Aspirin   - Continue Lipitor and Metoprolol   - Cardiology follow up noted: medical management      4) Acute Systolic Heart Failure   - Strict intake/output and daily weight  - ECHO as above   - Hold Lasix   - Continue Metoprolol  - Continue Farxiga   - Cardiology following     5) Paroxysmal A. Fib   - Newly diagnosed   - Continue Metoprolol  - Heparin Infusion was switched to FD Lovenox     6) Acute Pulmonary Embolism  - Heparin Infusion was switched to FD Lovenox   - No DVT on LE Doppler     7) Pneumothorax  - Tiny right sided  - Likely due to pneumonia  - Monitor     8) Dementia with Behavioral Symptoms  - Continue Seroquel at bedtime    9) Prediabetes  - HbA1c 6.2  - Carb consistent diet     DVT Prophylaxis -- Patient is on FD Lovenox.     Advance Directives: DNR/I. Prognosis guarded. Palliative Care Consult appreciated.     Dispo: Likely home once stable.

## 2022-07-23 LAB
ALBUMIN SERPL ELPH-MCNC: 2.9 G/DL — LOW (ref 3.3–5.2)
ALP SERPL-CCNC: 49 U/L — SIGNIFICANT CHANGE UP (ref 40–120)
ALT FLD-CCNC: 60 U/L — HIGH
ANION GAP SERPL CALC-SCNC: 8 MMOL/L — SIGNIFICANT CHANGE UP (ref 5–17)
AST SERPL-CCNC: 65 U/L — HIGH
BASOPHILS # BLD AUTO: 0.03 K/UL — SIGNIFICANT CHANGE UP (ref 0–0.2)
BASOPHILS NFR BLD AUTO: 0.2 % — SIGNIFICANT CHANGE UP (ref 0–2)
BILIRUB SERPL-MCNC: 0.6 MG/DL — SIGNIFICANT CHANGE UP (ref 0.4–2)
BUN SERPL-MCNC: 12.7 MG/DL — SIGNIFICANT CHANGE UP (ref 8–20)
CALCIUM SERPL-MCNC: 8 MG/DL — LOW (ref 8.6–10.2)
CHLORIDE SERPL-SCNC: 100 MMOL/L — SIGNIFICANT CHANGE UP (ref 98–107)
CO2 SERPL-SCNC: 31 MMOL/L — HIGH (ref 22–29)
CREAT SERPL-MCNC: 0.38 MG/DL — LOW (ref 0.5–1.3)
EGFR: 95 ML/MIN/1.73M2 — SIGNIFICANT CHANGE UP
EOSINOPHIL # BLD AUTO: 0.06 K/UL — SIGNIFICANT CHANGE UP (ref 0–0.5)
EOSINOPHIL NFR BLD AUTO: 0.4 % — SIGNIFICANT CHANGE UP (ref 0–6)
GLUCOSE SERPL-MCNC: 88 MG/DL — SIGNIFICANT CHANGE UP (ref 70–99)
HCT VFR BLD CALC: 41.6 % — SIGNIFICANT CHANGE UP (ref 34.5–45)
HGB BLD-MCNC: 13.7 G/DL — SIGNIFICANT CHANGE UP (ref 11.5–15.5)
IMM GRANULOCYTES NFR BLD AUTO: 0.5 % — SIGNIFICANT CHANGE UP (ref 0–1.5)
LYMPHOCYTES # BLD AUTO: 1.03 K/UL — SIGNIFICANT CHANGE UP (ref 1–3.3)
LYMPHOCYTES # BLD AUTO: 7.4 % — LOW (ref 13–44)
MAGNESIUM SERPL-MCNC: 2 MG/DL — SIGNIFICANT CHANGE UP (ref 1.8–2.6)
MCHC RBC-ENTMCNC: 30.9 PG — SIGNIFICANT CHANGE UP (ref 27–34)
MCHC RBC-ENTMCNC: 32.9 GM/DL — SIGNIFICANT CHANGE UP (ref 32–36)
MCV RBC AUTO: 93.7 FL — SIGNIFICANT CHANGE UP (ref 80–100)
MONOCYTES # BLD AUTO: 0.96 K/UL — HIGH (ref 0–0.9)
MONOCYTES NFR BLD AUTO: 6.9 % — SIGNIFICANT CHANGE UP (ref 2–14)
NEUTROPHILS # BLD AUTO: 11.8 K/UL — HIGH (ref 1.8–7.4)
NEUTROPHILS NFR BLD AUTO: 84.6 % — HIGH (ref 43–77)
PLATELET # BLD AUTO: 200 K/UL — SIGNIFICANT CHANGE UP (ref 150–400)
POTASSIUM SERPL-MCNC: 3.8 MMOL/L — SIGNIFICANT CHANGE UP (ref 3.5–5.3)
POTASSIUM SERPL-SCNC: 3.8 MMOL/L — SIGNIFICANT CHANGE UP (ref 3.5–5.3)
PROT SERPL-MCNC: 5.6 G/DL — LOW (ref 6.6–8.7)
RBC # BLD: 4.44 M/UL — SIGNIFICANT CHANGE UP (ref 3.8–5.2)
RBC # FLD: 14.2 % — SIGNIFICANT CHANGE UP (ref 10.3–14.5)
SODIUM SERPL-SCNC: 139 MMOL/L — SIGNIFICANT CHANGE UP (ref 135–145)
WBC # BLD: 13.95 K/UL — HIGH (ref 3.8–10.5)
WBC # FLD AUTO: 13.95 K/UL — HIGH (ref 3.8–10.5)

## 2022-07-23 PROCEDURE — 99233 SBSQ HOSP IP/OBS HIGH 50: CPT

## 2022-07-23 PROCEDURE — 70450 CT HEAD/BRAIN W/O DYE: CPT | Mod: 26

## 2022-07-23 RX ORDER — POTASSIUM CHLORIDE 20 MEQ
20 PACKET (EA) ORAL ONCE
Refills: 0 | Status: COMPLETED | OUTPATIENT
Start: 2022-07-23 | End: 2022-07-23

## 2022-07-23 RX ADMIN — Medication 0.25 MILLIGRAM(S): at 00:17

## 2022-07-23 RX ADMIN — ENOXAPARIN SODIUM 50 MILLIGRAM(S): 100 INJECTION SUBCUTANEOUS at 05:33

## 2022-07-23 RX ADMIN — DAPAGLIFLOZIN 10 MILLIGRAM(S): 10 TABLET, FILM COATED ORAL at 10:59

## 2022-07-23 RX ADMIN — PIPERACILLIN AND TAZOBACTAM 25 GRAM(S): 4; .5 INJECTION, POWDER, LYOPHILIZED, FOR SOLUTION INTRAVENOUS at 15:39

## 2022-07-23 RX ADMIN — ENOXAPARIN SODIUM 50 MILLIGRAM(S): 100 INJECTION SUBCUTANEOUS at 16:47

## 2022-07-23 RX ADMIN — PIPERACILLIN AND TAZOBACTAM 25 GRAM(S): 4; .5 INJECTION, POWDER, LYOPHILIZED, FOR SOLUTION INTRAVENOUS at 22:16

## 2022-07-23 RX ADMIN — QUETIAPINE FUMARATE 25 MILLIGRAM(S): 200 TABLET, FILM COATED ORAL at 22:16

## 2022-07-23 RX ADMIN — Medication 1 TABLET(S): at 11:00

## 2022-07-23 RX ADMIN — Medication 20 MILLIEQUIVALENT(S): at 11:00

## 2022-07-23 RX ADMIN — ATORVASTATIN CALCIUM 40 MILLIGRAM(S): 80 TABLET, FILM COATED ORAL at 22:16

## 2022-07-23 RX ADMIN — LOSARTAN POTASSIUM 12.5 MILLIGRAM(S): 100 TABLET, FILM COATED ORAL at 22:16

## 2022-07-23 RX ADMIN — Medication 25 MILLIGRAM(S): at 05:37

## 2022-07-23 RX ADMIN — PIPERACILLIN AND TAZOBACTAM 25 GRAM(S): 4; .5 INJECTION, POWDER, LYOPHILIZED, FOR SOLUTION INTRAVENOUS at 05:33

## 2022-07-23 RX ADMIN — Medication 81 MILLIGRAM(S): at 11:00

## 2022-07-23 RX ADMIN — Medication 25 MILLIGRAM(S): at 16:48

## 2022-07-23 NOTE — PROGRESS NOTE ADULT - SUBJECTIVE AND OBJECTIVE BOX
Hypoxemia    HPI:  89 year old female with history of Dementia, Anxiety, HTN, HLD and Breast Cancer s/p Mastectomy + Chemo brought by EMS with altered mental status. As per daughter - Tami Rao, patient was doing well until yesterday. Last night, she had a fall and was brought to the hospital. She was evaluated and later was discharged home. She was agitated when she got home but after some time she went to sleep. Later during the morning, family found her on the floor. She was confused so she was brought to the hospital. Family noticed some shortness of breath yesterday which they were attributing to agitation. No fever, sick contacts or recent travel.  In ER, she was found to have Sepsis likely secondary to Pneumonia, she was given IVF and Zosyn/Vanco. Also found to have NSTEMI and PE, seen by Cardiology and was started on Heparin Infusion. Bedside ECHO with EF of 30%.  (20 Jul 2022 17:27)    Interval History:  Patient was seen and examined at bedside around 10:15 am.  Found to be on the floor this morning. CT Head negative.   Awake and alert but confused.    Denies chest pain, palpitations, shortness of breath, headache, dizziness, visual symptoms, nausea, vomiting or abdominal pain.    ROS:  As per interval history otherwise unremarkable.    PHYSICAL EXAM:  Vital Signs  T(C): 36.2 (23 Jul 2022 14:49), Max: 36.5 (22 Jul 2022 16:00)  T(F): 97.2 (23 Jul 2022 14:49), Max: 97.7 (22 Jul 2022 16:00)  HR: 83 (23 Jul 2022 14:49) (80 - 98)  BP: 106/52 (23 Jul 2022 14:49) (95/57 - 121/65)  BP(mean): 65 (23 Jul 2022 12:00) (65 - 72)  RR: 18 (23 Jul 2022 14:49) (16 - 23)  SpO2: 97% (23 Jul 2022 14:49) (93% - 99%)  General: Elderly female sitting in bed comfortably. No acute distress  HEENT: PERRLA. EOMI. Clear conjunctivae. Moist mucus membrane  Neck: Supple.   Chest: Good air entry. Fine bibasilar crackles. No wheezing or rhonchi. No chest wall tenderness.  Heart: Normal S1 & S2 with RRR.   Abdomen: Soft. Non-tender. Non-distended. + BS  Ext: No pedal edema. No calf tenderness. Chronic skin changes.   Neuro: Awake and alert but not completely oriented. No focal deficit. Speech clear.   Skin: Warm and Dry  Psychiatry: Calm at the time of exam     I&O's Summary    22 Jul 2022 07:01  -  23 Jul 2022 07:00  --------------------------------------------------------  IN: 200 mL / OUT: 151 mL / NET: 49 mL    LABS:                        13.7   13.95 )-----------( 200      ( 23 Jul 2022 05:00 )             41.6     07-23    139  |  100  |  12.7  ----------------------------<  88  3.8   |  31.0<H>  |  0.38<L>    Ca    8.0<L>      23 Jul 2022 05:00  Mg     2.0     07-23    TPro  5.6<L>  /  Alb  2.9<L>  /  TBili  0.6  /  DBili  x   /  AST  65<H>  /  ALT  60<H>  /  AlkPhos  49  07-23    PTT - ( 21 Jul 2022 19:11 )  PTT:78.8 sec    Blood Culture: 07-20 @ 11:44  Organism --  Gram Stain Blood -- Gram Stain --  Specimen Source .Blood Blood-Peripheral  Culture-Blood --    07-20 @ 11:38  Organism --  Gram Stain Blood -- Gram Stain --  Specimen Source .Blood Blood-Peripheral  Culture-Blood --    RADIOLOGY & ADDITIONAL STUDIES:  CT Angio Chest PE Protocol w/ IV Cont (07.20.22 @ 14:29)    ACC: [id number] EXAM:  CT ANGIO CHEST PULM ART [facility]                        ACC: [id number] EXAM:  CT ABDOMEN AND PELVIS IC         Segmental right upper lobe pulmonary embolism.   Redemonstration of minimal right pneumothorax.  Small right pleural effusion again seen. Patchy infiltrates and nodular densities most confluent in the left lower lobe again seen.  No acute pathology in the abdomen or pelvis    TTE Echo Complete w/ Contrast w/ Doppler (07.21.22 @ 11:50)    1. Left ventricular ejection fraction, by visual estimation, is 20 to 25%.   2. Severely decreased global left ventricular systolic function.   3. Mildly increased left ventricular internal cavity size.   4. Spectral Doppler shows impaired relaxation pattern of left ventricular myocardial filling (Grade I diastolic dysfunction).   5. Normal right ventricular size and function, inadequate estimation of RVSP.   6. Mildly enlarged left atrium.   7. Mildly enlarged right atrium.   8. Mild mitral valve regurgitation.   9. Mild tricuspid regurgitation.  10. Moderate aortic valve stenosis.  11. Mild aortic regurgitation.  12. There is mild aortic root calcification.  13. There is no evidence of pericardial effusion.    MEDICATIONS  (STANDING):  aspirin  chewable 81 milliGRAM(s) Oral daily  atorvastatin 40 milliGRAM(s) Oral at bedtime  dapagliflozin 10 milliGRAM(s) Oral daily  enoxaparin Injectable 50 milliGRAM(s) SubCutaneous every 12 hours  losartan 12.5 milliGRAM(s) Oral at bedtime  metoprolol tartrate 25 milliGRAM(s) Oral two times a day  multivitamin 1 Tablet(s) Oral daily  piperacillin/tazobactam IVPB.. 3.375 Gram(s) IV Intermittent every 8 hours  QUEtiapine 25 milliGRAM(s) Oral at bedtime    MEDICATIONS  (PRN):  acetaminophen     Tablet .. 650 milliGRAM(s) Oral every 6 hours PRN Temp greater or equal to 38C (100.4F), Mild Pain (1 - 3), Moderate Pain (4 - 6)  ALPRAZolam 0.25 milliGRAM(s) Oral every 8 hours PRN anxiety  metoprolol tartrate Injectable 5 milliGRAM(s) IV Push every 6 hours PRN for sustain Afib >120s  ondansetron Injectable 4 milliGRAM(s) IV Push every 6 hours PRN Nausea and/or Vomiting

## 2022-07-24 LAB
ALBUMIN SERPL ELPH-MCNC: 3.1 G/DL — LOW (ref 3.3–5.2)
ALP SERPL-CCNC: 60 U/L — SIGNIFICANT CHANGE UP (ref 40–120)
ALT FLD-CCNC: 68 U/L — HIGH
ANION GAP SERPL CALC-SCNC: 12 MMOL/L — SIGNIFICANT CHANGE UP (ref 5–17)
AST SERPL-CCNC: 67 U/L — HIGH
BASOPHILS # BLD AUTO: 0.04 K/UL — SIGNIFICANT CHANGE UP (ref 0–0.2)
BASOPHILS NFR BLD AUTO: 0.3 % — SIGNIFICANT CHANGE UP (ref 0–2)
BILIRUB SERPL-MCNC: 0.7 MG/DL — SIGNIFICANT CHANGE UP (ref 0.4–2)
BUN SERPL-MCNC: 9.7 MG/DL — SIGNIFICANT CHANGE UP (ref 8–20)
CALCIUM SERPL-MCNC: 8.1 MG/DL — LOW (ref 8.6–10.2)
CHLORIDE SERPL-SCNC: 100 MMOL/L — SIGNIFICANT CHANGE UP (ref 98–107)
CO2 SERPL-SCNC: 29 MMOL/L — SIGNIFICANT CHANGE UP (ref 22–29)
CREAT SERPL-MCNC: 0.37 MG/DL — LOW (ref 0.5–1.3)
EGFR: 96 ML/MIN/1.73M2 — SIGNIFICANT CHANGE UP
EOSINOPHIL # BLD AUTO: 0.03 K/UL — SIGNIFICANT CHANGE UP (ref 0–0.5)
EOSINOPHIL NFR BLD AUTO: 0.2 % — SIGNIFICANT CHANGE UP (ref 0–6)
GLUCOSE SERPL-MCNC: 87 MG/DL — SIGNIFICANT CHANGE UP (ref 70–99)
HCT VFR BLD CALC: 45.6 % — HIGH (ref 34.5–45)
HGB BLD-MCNC: 14.8 G/DL — SIGNIFICANT CHANGE UP (ref 11.5–15.5)
IMM GRANULOCYTES NFR BLD AUTO: 0.6 % — SIGNIFICANT CHANGE UP (ref 0–1.5)
LYMPHOCYTES # BLD AUTO: 1.14 K/UL — SIGNIFICANT CHANGE UP (ref 1–3.3)
LYMPHOCYTES # BLD AUTO: 7.2 % — LOW (ref 13–44)
MAGNESIUM SERPL-MCNC: 2.1 MG/DL — SIGNIFICANT CHANGE UP (ref 1.8–2.6)
MCHC RBC-ENTMCNC: 30.5 PG — SIGNIFICANT CHANGE UP (ref 27–34)
MCHC RBC-ENTMCNC: 32.5 GM/DL — SIGNIFICANT CHANGE UP (ref 32–36)
MCV RBC AUTO: 93.8 FL — SIGNIFICANT CHANGE UP (ref 80–100)
MONOCYTES # BLD AUTO: 1.11 K/UL — HIGH (ref 0–0.9)
MONOCYTES NFR BLD AUTO: 7 % — SIGNIFICANT CHANGE UP (ref 2–14)
NEUTROPHILS # BLD AUTO: 13.4 K/UL — HIGH (ref 1.8–7.4)
NEUTROPHILS NFR BLD AUTO: 84.7 % — HIGH (ref 43–77)
PLATELET # BLD AUTO: 220 K/UL — SIGNIFICANT CHANGE UP (ref 150–400)
POTASSIUM SERPL-MCNC: 4 MMOL/L — SIGNIFICANT CHANGE UP (ref 3.5–5.3)
POTASSIUM SERPL-SCNC: 4 MMOL/L — SIGNIFICANT CHANGE UP (ref 3.5–5.3)
PROT SERPL-MCNC: 6.1 G/DL — LOW (ref 6.6–8.7)
RBC # BLD: 4.86 M/UL — SIGNIFICANT CHANGE UP (ref 3.8–5.2)
RBC # FLD: 14.6 % — HIGH (ref 10.3–14.5)
SODIUM SERPL-SCNC: 141 MMOL/L — SIGNIFICANT CHANGE UP (ref 135–145)
WBC # BLD: 15.81 K/UL — HIGH (ref 3.8–10.5)
WBC # FLD AUTO: 15.81 K/UL — HIGH (ref 3.8–10.5)

## 2022-07-24 PROCEDURE — 99233 SBSQ HOSP IP/OBS HIGH 50: CPT

## 2022-07-24 RX ORDER — MIRTAZAPINE 45 MG/1
7.5 TABLET, ORALLY DISINTEGRATING ORAL AT BEDTIME
Refills: 0 | Status: DISCONTINUED | OUTPATIENT
Start: 2022-07-24 | End: 2022-07-28

## 2022-07-24 RX ORDER — LOSARTAN POTASSIUM 100 MG/1
25 TABLET, FILM COATED ORAL DAILY
Refills: 0 | Status: DISCONTINUED | OUTPATIENT
Start: 2022-07-24 | End: 2022-07-28

## 2022-07-24 RX ADMIN — DAPAGLIFLOZIN 10 MILLIGRAM(S): 10 TABLET, FILM COATED ORAL at 11:24

## 2022-07-24 RX ADMIN — LOSARTAN POTASSIUM 25 MILLIGRAM(S): 100 TABLET, FILM COATED ORAL at 11:25

## 2022-07-24 RX ADMIN — ENOXAPARIN SODIUM 50 MILLIGRAM(S): 100 INJECTION SUBCUTANEOUS at 17:54

## 2022-07-24 RX ADMIN — PIPERACILLIN AND TAZOBACTAM 25 GRAM(S): 4; .5 INJECTION, POWDER, LYOPHILIZED, FOR SOLUTION INTRAVENOUS at 07:01

## 2022-07-24 RX ADMIN — Medication 81 MILLIGRAM(S): at 11:24

## 2022-07-24 RX ADMIN — PIPERACILLIN AND TAZOBACTAM 25 GRAM(S): 4; .5 INJECTION, POWDER, LYOPHILIZED, FOR SOLUTION INTRAVENOUS at 11:25

## 2022-07-24 RX ADMIN — ATORVASTATIN CALCIUM 40 MILLIGRAM(S): 80 TABLET, FILM COATED ORAL at 23:46

## 2022-07-24 RX ADMIN — QUETIAPINE FUMARATE 25 MILLIGRAM(S): 200 TABLET, FILM COATED ORAL at 23:46

## 2022-07-24 RX ADMIN — ENOXAPARIN SODIUM 50 MILLIGRAM(S): 100 INJECTION SUBCUTANEOUS at 08:35

## 2022-07-24 RX ADMIN — Medication 25 MILLIGRAM(S): at 07:01

## 2022-07-24 RX ADMIN — PIPERACILLIN AND TAZOBACTAM 25 GRAM(S): 4; .5 INJECTION, POWDER, LYOPHILIZED, FOR SOLUTION INTRAVENOUS at 23:46

## 2022-07-24 RX ADMIN — Medication 25 MILLIGRAM(S): at 17:54

## 2022-07-24 RX ADMIN — MIRTAZAPINE 7.5 MILLIGRAM(S): 45 TABLET, ORALLY DISINTEGRATING ORAL at 23:45

## 2022-07-24 RX ADMIN — Medication 1 TABLET(S): at 11:25

## 2022-07-24 NOTE — PROGRESS NOTE ADULT - ASSESSMENT
89 year old female with history of Dementia, Anxiety, HTN, HLD and Breast Cancer s/p Mastectomy + Chemo brought by EMS with altered mental status. As per daughter - Tami Rao, patient was doing well until yesterday. Last night, she had a fall and was brought to the hospital. She was evaluated and later was discharged home. She was agitated when she got home but after some time she went to sleep. Later during the morning, family found her on the floor. She was confused so she was brought to the hospital. Family noticed some shortness of breath yesterday which they were attributing to agitation. No fever, sick contacts or recent travel.  In ER, she was found to have Sepsis likely secondary to Pneumonia, she was given IVF and Zosyn/Vanco. Also found to have NSTEMI and PE, seen by Cardiology and was started on Heparin Infusion. Bedside ECHO with EF of 30%.     #Sepsis   - Likely secondary to Aspiration Pneumonia   - Cultures negative   - s/p Zosyn and Vanco in ER  - Continue Zosyn until 7/27  - trend CBC and fever curve  - If no improvement, will consider ID eval    #Dysphagia  - Puree diet only, no liquids  - Speech therapy following, re-eval in AM     #NSTEMI  - H/O HTN and HLD  - ECHO as above   - s/p Heparin Infusion   - Started Aspirin   - Continue Lipitor and Metoprolol   - Cardiology follow up noted: medical management      #Acute Systolic Heart Failure   - Strict intake/output and daily weight  - ECHO as above   - Hold Lasix   - Continue Metoprolol and Losartan (increase to 25mg QD)  - Continue Farxiga   - Cardiology follow up noted      #Paroxysmal A. Fib   - Newly diagnosed   - Continue Metoprolol  - Heparin Infusion was switched to FD Lovenox (switch to DOAC on discharge)     #Acute Pulmonary Embolism  - Heparin Infusion was switched to FD Lovenox (switch to DOAC on discharge)   - No DVT on LE Doppler     #Pneumothorax  - Tiny right sided  - Likely due to pneumonia  - Monitor     #Dementia with Behavioral Symptoms  - Continue Seroquel at bedtime    #Prediabetes  - HbA1c 6.2  - Carb consistent diet     DVT Prophylaxis -- Patient is on FD Lovenox -> change to  Xarelto or Eliquis VTE dosing on discharge    Advance Directives: DNR/I. Prognosis guarded. Palliative Care Consult appreciated. Pending hospice referral    Dispo: Likely Home Hospice in 2-3 days.

## 2022-07-24 NOTE — PROGRESS NOTE ADULT - NS ATTEST RISK PROBLEM GEN_ALL_CORE FT
Aspiration Pneumonia.  NSTEMI.  Acute Systolic Heart Failure.
Acute Respiratory Failure with Hypoxia.  Pneumonia.  NSTEMI.  New Onset A. Fib.  Acute Systolic Heart Failure.
Aspiration Pneumonia.  NSTEMI.  Acute Systolic Heart Failure.
Acute Respiratory Failure with Hypoxia.  Pneumonia.  NSTEMI.  New Onset A. Fib.  Acute Systolic Heart Failure.

## 2022-07-24 NOTE — PROGRESS NOTE ADULT - SUBJECTIVE AND OBJECTIVE BOX
Walter Mcqueen M.D.    Patient is a 89y old  Female who presents with a chief complaint of AMS (23 Jul 2022 15:27)      SUBJECTIVE / OVERNIGHT EVENTS: no concerns. Reports wanting to go home.     Patient denies chest pain, SOB, abd pain, N/V, fever, chills, dysuria or any other complaints. All remainder ROS negative.     MEDICATIONS  (STANDING):  aspirin  chewable 81 milliGRAM(s) Oral daily  atorvastatin 40 milliGRAM(s) Oral at bedtime  dapagliflozin 10 milliGRAM(s) Oral daily  enoxaparin Injectable 50 milliGRAM(s) SubCutaneous every 12 hours  losartan 25 milliGRAM(s) Oral daily  metoprolol tartrate 25 milliGRAM(s) Oral two times a day  multivitamin 1 Tablet(s) Oral daily  piperacillin/tazobactam IVPB.. 3.375 Gram(s) IV Intermittent every 8 hours  QUEtiapine 25 milliGRAM(s) Oral at bedtime    MEDICATIONS  (PRN):  acetaminophen     Tablet .. 650 milliGRAM(s) Oral every 6 hours PRN Temp greater or equal to 38C (100.4F), Mild Pain (1 - 3), Moderate Pain (4 - 6)  ALPRAZolam 0.25 milliGRAM(s) Oral every 8 hours PRN anxiety  metoprolol tartrate Injectable 5 milliGRAM(s) IV Push every 6 hours PRN for sustain Afib >120s  ondansetron Injectable 4 milliGRAM(s) IV Push every 6 hours PRN Nausea and/or Vomiting      I&O's Summary      PHYSICAL EXAM:  Vital Signs Last 24 Hrs  T(C): 36.3 (24 Jul 2022 04:52), Max: 36.4 (23 Jul 2022 17:09)  T(F): 97.4 (24 Jul 2022 04:52), Max: 97.5 (23 Jul 2022 17:09)  HR: 99 (24 Jul 2022 04:52) (83 - 99)  BP: 153/81 (24 Jul 2022 04:52) (101/54 - 153/81)  BP(mean): 65 (23 Jul 2022 12:00) (65 - 65)  RR: 26 (24 Jul 2022 04:52) (18 - 26)  SpO2: 96% (24 Jul 2022 04:52) (96% - 100%)    Parameters below as of 24 Jul 2022 04:52  Patient On (Oxygen Delivery Method): room air    General: Elderly female sitting in bed comfortably, emotionally, wanting to go home  HEENT: PERRLA. EOMI. Clear conjunctivae. Moist mucus membrane  Neck: Supple.   Chest: Good air entry. b/l basilar crackles   Heart: Normal S1 & S2 with RRR.   Abdomen: Soft. Non-tender. Non-distended. + BS  Ext: No pedal edema. No calf tenderness. Chronic skin changes.   Neuro: Awake and alert but not completely oriented. No focal deficit. Speech clear.   Skin: Warm and Dry  Psychiatry: Calm at the time of exam       LABS:                        14.8   15.81 )-----------( 220      ( 24 Jul 2022 06:15 )             45.6     07-24    141  |  100  |  9.7  ----------------------------<  87  4.0   |  29.0  |  0.37<L>    Ca    8.1<L>      24 Jul 2022 06:15  Mg     2.1     07-24    TPro  6.1<L>  /  Alb  3.1<L>  /  TBili  0.7  /  DBili  x   /  AST  67<H>  /  ALT  68<H>  /  AlkPhos  60  07-24              CAPILLARY BLOOD GLUCOSE          RADIOLOGY & ADDITIONAL TESTS:  Results Reviewed:   Imaging Personally Reviewed:  Electrocardiogram Personally Reviewed:

## 2022-07-25 ENCOUNTER — TRANSCRIPTION ENCOUNTER (OUTPATIENT)
Age: 87
End: 2022-07-25

## 2022-07-25 LAB
ALBUMIN SERPL ELPH-MCNC: 3.1 G/DL — LOW (ref 3.3–5.2)
ALP SERPL-CCNC: 55 U/L — SIGNIFICANT CHANGE UP (ref 40–120)
ALT FLD-CCNC: 64 U/L — HIGH
ANION GAP SERPL CALC-SCNC: 15 MMOL/L — SIGNIFICANT CHANGE UP (ref 5–17)
AST SERPL-CCNC: 59 U/L — HIGH
BASOPHILS # BLD AUTO: 0.05 K/UL — SIGNIFICANT CHANGE UP (ref 0–0.2)
BASOPHILS NFR BLD AUTO: 0.3 % — SIGNIFICANT CHANGE UP (ref 0–2)
BILIRUB SERPL-MCNC: 0.7 MG/DL — SIGNIFICANT CHANGE UP (ref 0.4–2)
BUN SERPL-MCNC: 9.2 MG/DL — SIGNIFICANT CHANGE UP (ref 8–20)
CALCIUM SERPL-MCNC: 8.1 MG/DL — LOW (ref 8.6–10.2)
CHLORIDE SERPL-SCNC: 101 MMOL/L — SIGNIFICANT CHANGE UP (ref 98–107)
CO2 SERPL-SCNC: 27 MMOL/L — SIGNIFICANT CHANGE UP (ref 22–29)
CREAT SERPL-MCNC: 0.34 MG/DL — LOW (ref 0.5–1.3)
CULTURE RESULTS: SIGNIFICANT CHANGE UP
CULTURE RESULTS: SIGNIFICANT CHANGE UP
EGFR: 98 ML/MIN/1.73M2 — SIGNIFICANT CHANGE UP
EOSINOPHIL # BLD AUTO: 0.04 K/UL — SIGNIFICANT CHANGE UP (ref 0–0.5)
EOSINOPHIL NFR BLD AUTO: 0.2 % — SIGNIFICANT CHANGE UP (ref 0–6)
GLUCOSE SERPL-MCNC: 75 MG/DL — SIGNIFICANT CHANGE UP (ref 70–99)
HCT VFR BLD CALC: 47.1 % — HIGH (ref 34.5–45)
HGB BLD-MCNC: 15.1 G/DL — SIGNIFICANT CHANGE UP (ref 11.5–15.5)
IMM GRANULOCYTES NFR BLD AUTO: 0.6 % — SIGNIFICANT CHANGE UP (ref 0–1.5)
LYMPHOCYTES # BLD AUTO: 1.35 K/UL — SIGNIFICANT CHANGE UP (ref 1–3.3)
LYMPHOCYTES # BLD AUTO: 8.3 % — LOW (ref 13–44)
MAGNESIUM SERPL-MCNC: 2 MG/DL — SIGNIFICANT CHANGE UP (ref 1.8–2.6)
MCHC RBC-ENTMCNC: 30.1 PG — SIGNIFICANT CHANGE UP (ref 27–34)
MCHC RBC-ENTMCNC: 32.1 GM/DL — SIGNIFICANT CHANGE UP (ref 32–36)
MCV RBC AUTO: 93.8 FL — SIGNIFICANT CHANGE UP (ref 80–100)
MONOCYTES # BLD AUTO: 1.09 K/UL — HIGH (ref 0–0.9)
MONOCYTES NFR BLD AUTO: 6.7 % — SIGNIFICANT CHANGE UP (ref 2–14)
NEUTROPHILS # BLD AUTO: 13.72 K/UL — HIGH (ref 1.8–7.4)
NEUTROPHILS NFR BLD AUTO: 83.9 % — HIGH (ref 43–77)
PHOSPHATE SERPL-MCNC: 2.6 MG/DL — SIGNIFICANT CHANGE UP (ref 2.4–4.7)
PLATELET # BLD AUTO: 227 K/UL — SIGNIFICANT CHANGE UP (ref 150–400)
POTASSIUM SERPL-MCNC: 3.3 MMOL/L — LOW (ref 3.5–5.3)
POTASSIUM SERPL-SCNC: 3.3 MMOL/L — LOW (ref 3.5–5.3)
PROT SERPL-MCNC: 6 G/DL — LOW (ref 6.6–8.7)
RBC # BLD: 5.02 M/UL — SIGNIFICANT CHANGE UP (ref 3.8–5.2)
RBC # FLD: 14.6 % — HIGH (ref 10.3–14.5)
SODIUM SERPL-SCNC: 143 MMOL/L — SIGNIFICANT CHANGE UP (ref 135–145)
SPECIMEN SOURCE: SIGNIFICANT CHANGE UP
SPECIMEN SOURCE: SIGNIFICANT CHANGE UP
WBC # BLD: 16.35 K/UL — HIGH (ref 3.8–10.5)
WBC # FLD AUTO: 16.35 K/UL — HIGH (ref 3.8–10.5)

## 2022-07-25 PROCEDURE — 99233 SBSQ HOSP IP/OBS HIGH 50: CPT

## 2022-07-25 PROCEDURE — 99232 SBSQ HOSP IP/OBS MODERATE 35: CPT

## 2022-07-25 RX ORDER — FUROSEMIDE 40 MG
20 TABLET ORAL DAILY
Refills: 0 | Status: DISCONTINUED | OUTPATIENT
Start: 2022-07-26 | End: 2022-07-28

## 2022-07-25 RX ORDER — POTASSIUM CHLORIDE 20 MEQ
10 PACKET (EA) ORAL
Refills: 0 | Status: COMPLETED | OUTPATIENT
Start: 2022-07-25 | End: 2022-07-25

## 2022-07-25 RX ORDER — METOPROLOL TARTRATE 50 MG
50 TABLET ORAL DAILY
Refills: 0 | Status: DISCONTINUED | OUTPATIENT
Start: 2022-07-25 | End: 2022-07-28

## 2022-07-25 RX ORDER — FUROSEMIDE 40 MG
20 TABLET ORAL ONCE
Refills: 0 | Status: COMPLETED | OUTPATIENT
Start: 2022-07-25 | End: 2022-07-25

## 2022-07-25 RX ADMIN — PIPERACILLIN AND TAZOBACTAM 25 GRAM(S): 4; .5 INJECTION, POWDER, LYOPHILIZED, FOR SOLUTION INTRAVENOUS at 06:02

## 2022-07-25 RX ADMIN — Medication 20 MILLIGRAM(S): at 16:43

## 2022-07-25 RX ADMIN — Medication 25 MILLIGRAM(S): at 06:02

## 2022-07-25 RX ADMIN — Medication 100 MILLIEQUIVALENT(S): at 10:35

## 2022-07-25 RX ADMIN — Medication 100 MILLIEQUIVALENT(S): at 09:35

## 2022-07-25 RX ADMIN — Medication 100 MILLIEQUIVALENT(S): at 08:21

## 2022-07-25 RX ADMIN — ENOXAPARIN SODIUM 50 MILLIGRAM(S): 100 INJECTION SUBCUTANEOUS at 06:03

## 2022-07-25 RX ADMIN — Medication 1 TABLET(S): at 13:16

## 2022-07-25 RX ADMIN — PIPERACILLIN AND TAZOBACTAM 25 GRAM(S): 4; .5 INJECTION, POWDER, LYOPHILIZED, FOR SOLUTION INTRAVENOUS at 22:13

## 2022-07-25 RX ADMIN — ENOXAPARIN SODIUM 50 MILLIGRAM(S): 100 INJECTION SUBCUTANEOUS at 18:31

## 2022-07-25 RX ADMIN — PIPERACILLIN AND TAZOBACTAM 25 GRAM(S): 4; .5 INJECTION, POWDER, LYOPHILIZED, FOR SOLUTION INTRAVENOUS at 13:15

## 2022-07-25 RX ADMIN — LOSARTAN POTASSIUM 25 MILLIGRAM(S): 100 TABLET, FILM COATED ORAL at 06:03

## 2022-07-25 RX ADMIN — Medication 81 MILLIGRAM(S): at 13:15

## 2022-07-25 RX ADMIN — MIRTAZAPINE 7.5 MILLIGRAM(S): 45 TABLET, ORALLY DISINTEGRATING ORAL at 22:14

## 2022-07-25 RX ADMIN — DAPAGLIFLOZIN 10 MILLIGRAM(S): 10 TABLET, FILM COATED ORAL at 13:16

## 2022-07-25 RX ADMIN — ATORVASTATIN CALCIUM 40 MILLIGRAM(S): 80 TABLET, FILM COATED ORAL at 22:13

## 2022-07-25 RX ADMIN — QUETIAPINE FUMARATE 25 MILLIGRAM(S): 200 TABLET, FILM COATED ORAL at 22:14

## 2022-07-25 NOTE — DIETITIAN INITIAL EVALUATION ADULT - NS FNS DIET ORDER
Diet, Pureed:   Consistent Carbohydrate {No Snacks} (CSTCHO)  DASH/TLC {Sodium & Cholesterol Restricted} (DASH)  No Liquids (NOLIQUIDS) (07-22-22 @ 07:38)

## 2022-07-25 NOTE — DISCHARGE NOTE PROVIDER - DETAILS OF MALNUTRITION DIAGNOSIS/DIAGNOSES
This patient has been assessed with a concern for Malnutrition and was treated during this hospitalization for the following Nutrition diagnosis/diagnoses:     -  07/25/2022: Moderate protein-calorie malnutrition

## 2022-07-25 NOTE — PROGRESS NOTE ADULT - SUBJECTIVE AND OBJECTIVE BOX
[facility]                                                               Office: [address] [address]                                                              Telephone: [phone]. Fax:[phone]                                                                             PROGRESS NOTE  Reason for follow up: pAfib RVR, HFrEF  Overnight: No new events.   Update: denies cardiac complains, eager to go home, remains on 1:1 for safety, family at bedside, appears dyspneic today sitting upright in bed, Tele with pAfib -140s      Review of symptoms:   Cardiac:  No chest pain. No dyspnea. No palpitations.  Respiratory: No cough. No dyspnea  Gastrointestinal: No diarrhea. No abdominal pain. No bleeding.     Past medical history: No updates.   	  Vital Signs Last 24 Hrs  T(C): 36.4 (07-25-22 @ 13:41), Max: 36.8 (07-24-22 @ 18:28)  T(F): 97.5 (07-25-22 @ 13:41), Max: 98.2 (07-24-22 @ 18:28)  HR: 107 (07-25-22 @ 13:41) (78 - 107)  BP: 117/69 (07-25-22 @ 13:41) (117/69 - 137/69)  BP(mean): --  RR: 20 (07-25-22 @ 13:41) (18 - 20)  SpO2: 95% (07-25-22 @ 13:41) (94% - 95%)  I&O's Summary        PHYSICAL EXAM:  Appearance: Comfortable. cachetic   HEENT:  Head and neck: Atraumatic. Normocephalic.  Normal oral mucosa, PERRL, Neck is supple. prominent EJV   Neurologic: A&Ox 2, no focal deficits. EOMI, Cranial nerves are intact.  Lymphatic: No cervical lymphadenopathy  Cardiovascular: Normal S1 S2, No murmur, rubs/gallops. No JVD, No edema  Respiratory: basilar crackles  Gastrointestinal:  Soft, Non-tender, + BS  Lower Extremities: No edema  Psychiatry: appears anxious   Skin: No rashes/ecchymoses/cyanosis/ulcers visualized on the face, hands or feet.      CURRENT MEDICATIONS:  MEDICATIONS  (STANDING):  aspirin  chewable 81 milliGRAM(s) Oral daily  atorvastatin 40 milliGRAM(s) Oral at bedtime  dapagliflozin 10 milliGRAM(s) Oral daily  enoxaparin Injectable 50 milliGRAM(s) SubCutaneous every 12 hours  furosemide   Injectable 20 milliGRAM(s) IV Push once  losartan 25 milliGRAM(s) Oral daily  metoprolol succinate ER 50 milliGRAM(s) Oral daily  mirtazapine 7.5 milliGRAM(s) Oral at bedtime  multivitamin 1 Tablet(s) Oral daily  piperacillin/tazobactam IVPB.. 3.375 Gram(s) IV Intermittent every 8 hours  QUEtiapine 25 milliGRAM(s) Oral at bedtime    MEDICATIONS  (PRN):  acetaminophen     Tablet .. 650 milliGRAM(s) Oral every 6 hours PRN Temp greater or equal to 38C (100.4F), Mild Pain (1 - 3), Moderate Pain (4 - 6)  ALPRAZolam 0.25 milliGRAM(s) Oral every 8 hours PRN anxiety  metoprolol tartrate Injectable 5 milliGRAM(s) IV Push every 6 hours PRN for sustain Afib >120s  ondansetron Injectable 4 milliGRAM(s) IV Push every 6 hours PRN Nausea and/or Vomiting      DIAGNOSTIC TESTING:  [ ] Echocardiogram:   < from: TTE Echo Complete w/ Contrast w/ Doppler (07.21.22 @ 11:50) >  Summary:   1. Left ventricular ejection fraction, by visual estimation, is 20 to   25%.   2. Severely decreased global left ventricular systolic function.   3. Mildly increased left ventricular internal cavity size.   4. Spectral Doppler shows impaired relaxation pattern of left   ventricular myocardial filling (Grade I diastolic dysfunction).   5. Normal right ventricular size and function, inadequate estimation of   RVSP.   6. Mildly enlarged left atrium.   7. Mildly enlarged right atrium.   8. Mild mitral valve regurgitation.   9. Mild tricuspid regurgitation.  10. Moderate aortic valve stenosis.  11. Mild aortic regurgitation.  12. There is mild aortic root calcification.  13. There is no evidence of pericardial effusion.    < end of copied text >    [ ]  Catheterization:  [ ] Stress Test:      Labs:                        15.1   16.35 )-----------( 227      ( 25 Jul 2022 05:06 )             47.1     07-25    143  |  101  |  9.2  ----------------------------<  75  3.3<L>   |  27.0  |  0.34<L>    Ca    8.1<L>      25 Jul 2022 05:06  Phos  2.6     07-25  Mg     2.0     07-25    TPro  6.0<L>  /  Alb  3.1<L>  /  TBili  0.7  /  DBili  x   /  AST  59<H>  /  ALT  64<H>  /  AlkPhos  55  07-25 21 Jul 2022 05:43 Troponin 0.16 ng/mL / Creatine Kinase 530 U/L /  CKMB 16.7 ng/mL / CPK Mass Assay % x       20 Jul 2022 20:20 Troponin 0.20 ng/mL / Creatine Kinase 657 U/L /  CKMB 12.4 ng/mL / CPK Mass Assay % x       20 Jul 2022 11:38 Troponin 0.31 ng/mL / Creatine Kinase 334 U/L /  CKMB 11.4 ng/mL / CPK Mass Assay % x          Serum Pro-Brain Natriuretic Peptide: 52997 pg/mL (07-20-22 @ 11:38)  Serum Pro-Brain Natriuretic Peptide: 502 pg/mL (07-28-20 @ 13:19)    Cholesterol 101 mg/dL; Direct LDL --; HDL Cholesterol, Serum 56 mg/dL; HDL/ Total Cholesterol Ratio Measurement --; Total Cholesterol/ HDL Ratio Measurement --; Triglycerides, Serum 65 mg/dL  A1C with Estimated Average Glucose Result: 6.2 % (07-21-22 @ 05:43)      TELEMETRY: Sinus tach 110s with pAfib -140s

## 2022-07-25 NOTE — PROGRESS NOTE ADULT - SUBJECTIVE AND OBJECTIVE BOX
OVERNIGHT EVENTS: awaiting home hospice     Present Symptoms:     Dyspnea: none   Nausea/Vomiting: No  Anxiety:  No  Depression: unable   Fatigue: Yes   Loss of appetite: unable   Constipation: none     Pain: none             Character-            Duration-            Effect-            Factors-            Frequency-            Location-            Severity-    Pain AD Score:  [email or web address] (press ctrl + left click to view)    Review of Systems: Reviewed              Unable to obtain due to poor mentation   All others negative    MEDICATIONS  (STANDING):  aspirin  chewable 81 milliGRAM(s) Oral daily  atorvastatin 40 milliGRAM(s) Oral at bedtime  dapagliflozin 10 milliGRAM(s) Oral daily  enoxaparin Injectable 50 milliGRAM(s) SubCutaneous every 12 hours  losartan 25 milliGRAM(s) Oral daily  metoprolol tartrate 25 milliGRAM(s) Oral two times a day  mirtazapine 7.5 milliGRAM(s) Oral at bedtime  multivitamin 1 Tablet(s) Oral daily  piperacillin/tazobactam IVPB.. 3.375 Gram(s) IV Intermittent every 8 hours  QUEtiapine 25 milliGRAM(s) Oral at bedtime    MEDICATIONS  (PRN):  acetaminophen     Tablet .. 650 milliGRAM(s) Oral every 6 hours PRN Temp greater or equal to 38C (100.4F), Mild Pain (1 - 3), Moderate Pain (4 - 6)  ALPRAZolam 0.25 milliGRAM(s) Oral every 8 hours PRN anxiety  metoprolol tartrate Injectable 5 milliGRAM(s) IV Push every 6 hours PRN for sustain Afib >120s  ondansetron Injectable 4 milliGRAM(s) IV Push every 6 hours PRN Nausea and/or Vomiting    PHYSICAL EXAM:    Vital Signs Last 24 Hrs  T(C): 36.4 (25 Jul 2022 05:47), Max: 36.8 (24 Jul 2022 18:28)  T(F): 97.6 (25 Jul 2022 05:47), Max: 98.2 (24 Jul 2022 18:28)  HR: 78 (25 Jul 2022 05:47) (78 - 98)  BP: 128/74 (25 Jul 2022 05:47) (128/74 - 137/69)  BP(mean): --  RR: 18 (25 Jul 2022 05:47) (18 - 18)  SpO2: 95% (25 Jul 2022 05:47) (94% - 95%)    Parameters below as of 24 Jul 2022 18:28  Patient On (Oxygen Delivery Method): room air    General: alert cachexia lying in bed     Karnofsky:  30%    HEENT: normal      Lungs: comfortable     CV: normal      GI: normal    : normal      MSK: weakness    Skin: no rash    LABS:                      15.1   16.35 )-----------( 227      ( 25 Jul 2022 05:06 )             47.1     07-25    143  |  101  |  9.2  ----------------------------<  75  3.3<L>   |  27.0  |  0.34<L>    Ca    8.1<L>      25 Jul 2022 05:06  Phos  2.6     07-25  Mg     2.0     07-25    TPro  6.0<L>  /  Alb  3.1<L>  /  TBili  0.7  /  DBili  x   /  AST  59<H>  /  ALT  64<H>  /  AlkPhos  55  07-25    I&O's Summary  RADIOLOGY & ADDITIONAL STUDIES:    < from: CT Head No Cont (07.23.22 @ 00:25) >  INTERPRETATION:  CLINICAL INFORMATION: Unwitnessed fall.    TECHNIQUE:  Axial CT images were acquired through the head.  Intravenous contrast: None  Two-dimensional reformats were generated.    COMPARISON STUDY: 07/20/2022.    FINDINGS:  There is no CT evidence of acute intracranial hemorrhage, subdural   collection, mass effect or acute territorial infarct.    Mild to moderate generalized cerebral volume loss and mild patchy   periventricular white matter hypoattenuation compatible with chronic   microvascular ischemic disease are stable.      The visualized paranasal sinuses and mastoids are grossly clear.    The calvarium, skull base, and orbits appear within normal limits.    IMPRESSION:  No CT evidence of acute intracranial pathology.  No significant interval   change from 07/23/2022.    --- End of Report ---    < end of copied text >    ADVANCE DIRECTIVES/TREATMENT PREFERENCES:  DNR/DNI

## 2022-07-25 NOTE — DISCHARGE NOTE PROVIDER - NSDCMRMEDTOKEN_GEN_ALL_CORE_FT
azelastine 205.5 mcg/inh (0.15%) nasal spray: 2 spray(s) nasal 2 times a day, As Needed  metoprolol succinate 25 mg oral tablet, extended release: 1 tab(s) orally once a day  Multiple Vitamins oral tablet: 1 tab(s) orally once a day  pravastatin 20 mg oral tablet: 1 tab(s) orally once a day  protriptyline 10 mg oral tablet: 1 tab(s) orally once a day (at bedtime)  SEROquel 25 mg oral tablet: 1 tab(s) orally once a day   azelastine 205.5 mcg/inh (0.15%) nasal spray: 2 spray(s) nasal 2 times a day, As Needed  dapagliflozin 10 mg oral tablet: 1 tab(s) orally once a day  Eliquis 5 mg oral tablet: 1 tab(s) orally every 12 hours   furosemide 20 mg oral tablet: 1 tab(s) orally once a day  losartan 25 mg oral tablet: 1 tab(s) orally once a day  metoprolol succinate 50 mg oral tablet, extended release: 1 tab(s) orally once a day  mirtazapine 7.5 mg oral tablet: 1 tab(s) orally once a day (at bedtime)  Multiple Vitamins oral tablet: 1 tab(s) orally once a day  pravastatin 20 mg oral tablet: 1 tab(s) orally once a day  SEROquel 25 mg oral tablet: 1 tab(s) orally once a day

## 2022-07-25 NOTE — DIETITIAN INITIAL EVALUATION ADULT - PERTINENT LABORATORY DATA
07-25    143  |  101  |  9.2  ----------------------------<  75  3.3<L>   |  27.0  |  0.34<L>    Ca    8.1<L>      25 Jul 2022 05:06  Phos  2.6     07-25  Mg     2.0     07-25    TPro  6.0<L>  /  Alb  3.1<L>  /  TBili  0.7  /  DBili  x   /  AST  59<H>  /  ALT  64<H>  /  AlkPhos  55  07-25  A1C with Estimated Average Glucose Result: 6.2 % (07-21-22 @ 05:43)

## 2022-07-25 NOTE — DIETITIAN INITIAL EVALUATION ADULT - PERTINENT MEDS FT
MEDICATIONS  (STANDING):  aspirin  chewable 81 milliGRAM(s) Oral daily  atorvastatin 40 milliGRAM(s) Oral at bedtime  dapagliflozin 10 milliGRAM(s) Oral daily  enoxaparin Injectable 50 milliGRAM(s) SubCutaneous every 12 hours  losartan 25 milliGRAM(s) Oral daily  metoprolol tartrate 25 milliGRAM(s) Oral two times a day  mirtazapine 7.5 milliGRAM(s) Oral at bedtime  multivitamin 1 Tablet(s) Oral daily  piperacillin/tazobactam IVPB.. 3.375 Gram(s) IV Intermittent every 8 hours  QUEtiapine 25 milliGRAM(s) Oral at bedtime    MEDICATIONS  (PRN):  acetaminophen     Tablet .. 650 milliGRAM(s) Oral every 6 hours PRN Temp greater or equal to 38C (100.4F), Mild Pain (1 - 3), Moderate Pain (4 - 6)  ALPRAZolam 0.25 milliGRAM(s) Oral every 8 hours PRN anxiety  metoprolol tartrate Injectable 5 milliGRAM(s) IV Push every 6 hours PRN for sustain Afib >120s  ondansetron Injectable 4 milliGRAM(s) IV Push every 6 hours PRN Nausea and/or Vomiting

## 2022-07-25 NOTE — DISCHARGE NOTE PROVIDER - NSDCCPCAREPLAN_GEN_ALL_CORE_FT
PRINCIPAL DISCHARGE DIAGNOSIS  Diagnosis: Gram-negative pneumonia  Assessment and Plan of Treatment:       SECONDARY DISCHARGE DIAGNOSES  Diagnosis: NSTEMI (non-ST elevation myocardial infarction)  Assessment and Plan of Treatment:     Diagnosis: Pulmonary embolism  Assessment and Plan of Treatment:     Diagnosis: Acute systolic congestive heart failure  Assessment and Plan of Treatment:     Diagnosis: Atrial fibrillation  Assessment and Plan of Treatment:      PRINCIPAL DISCHARGE DIAGNOSIS  Diagnosis: Gram-negative pneumonia  Assessment and Plan of Treatment: Started on IV antibiotics, transitioned to oral. Continue medications as prescribed. Please follow up with your primary care physician within 1 week.      SECONDARY DISCHARGE DIAGNOSES  Diagnosis: Pulmonary embolism  Assessment and Plan of Treatment: Continue blood thinner as prescribed. Please follow up with your primary care physician within 1 week.    Diagnosis: NSTEMI (non-ST elevation myocardial infarction)  Assessment and Plan of Treatment: Seen by cardiology, Continue medications as prescribed. Please follow up with your primary care physician and cardiologist within 1 week.    Diagnosis: Acute systolic congestive heart failure  Assessment and Plan of Treatment: lasx held and now restarted    Diagnosis: Atrial fibrillation  Assessment and Plan of Treatment: Continue blood thinner as prescribed. Please follow up with your primary care physician within 1 week.     PRINCIPAL DISCHARGE DIAGNOSIS  Diagnosis: Gram-negative pneumonia  Assessment and Plan of Treatment: Completed full course of IV antiotics.  Please follow up with your primary care physician within 1 week.      SECONDARY DISCHARGE DIAGNOSES  Diagnosis: Pulmonary embolism  Assessment and Plan of Treatment: Continue blood thinner as prescribed. Please follow up with your primary care physician within 1 week.    Diagnosis: NSTEMI (non-ST elevation myocardial infarction)  Assessment and Plan of Treatment: Seen by cardiology, Continue medications as prescribed. Please follow up with your primary care physician and cardiologist within 1 week.    Diagnosis: Acute systolic congestive heart failure  Assessment and Plan of Treatment: lasx held and now restarted.  Started on metoprolol and farxiga; take as prescribed.  follow up with cardiology in 1week.    Diagnosis: Atrial fibrillation  Assessment and Plan of Treatment: Continue blood thinner as prescribed. Please follow up with your primary care physician within 1 week.

## 2022-07-25 NOTE — DIETITIAN INITIAL EVALUATION ADULT - ADD RECOMMEND
Liberalize DASH/TLC and Munir restrictions to increase menu options on pureed diet; Add Ensure Pudding TID; Rx MVI and vit C 500mg daily; Encourage HBV protein sources; Provide encouragement/assistance as needed during mealtimes to inc PO

## 2022-07-25 NOTE — PROGRESS NOTE ADULT - ASSESSMENT
89F h/o mild dementia (per family still able to do all ADLs), anxiety, HTN, HLD, bilateral breast cancer (s/p resection, radiation and chemo twice, last 10yrs ago), no known prior cardiac history no outpatient cardiologist, was seen in the ER after fall and discharged home without blood work, returned next day afternoon with recurrent falls and worsening mental status, noted fever 103 and leukocytosis 24K, serum lactate 4.6 given 500m IVF, also pBNP 31K and Trop 0.31, newly dx Afib RVR with EKG with lateral T-wave abnormality, CTA found segmental PE, pulmonary infiltrates      Acute HFrEF  -TTE with severe cardiomyopathy LV EF 25%, suspect chronicity to underlying severe cardiomyopathy with minimal Troponin elevation, I have had an extensive discussion with patient's family/daughter Tami given her current state with unexplained severe cachexia/recurrent falls/sepsis she would be at high risk for complication from cath/PCI, recommend medical management for now and reassess candidacy for ischemic workup as outpatient.   -continue metoprolol change to succinate, low SBP resolved tolerating losartan 25mg, defer ARNi for now; continue Farxiga 10mg, appears dyspneic with crackles given IVP Lasix 20mg x1 and start PO Lasix 20mg daily    pAfib RVR  -revert betweem sinus tach and pAfib RVR, continue metoprolol may need succinate dose 100mg, if BP cannot tolerate would add low dose digoxin 0.125mg daily  -continue anticoagulation, change to DOAC on discharge    Acute segmental PE  -on LMWH, change to Xarelto or Eliquis VTE dosing on discharge    Sepsis  -WBC downtrending, on empiric antibiotic for pneumonia    Unexplained cachexia, frailty recurrent falls at risk for bleeding on anticoagulant  -continue nutrition support       -overall prognosis guarded given extensive comorbidities, discussed with patient's daughter (Tami) to address goals of care, both she and her brother are HCP, palliative consulted now DNR/DNI and planning home hospice care.       Flynn Cason DO, [facility]  Faculty Non-Invasive Cardiologist  [phone].

## 2022-07-25 NOTE — CHART NOTE - NSCHARTNOTEFT_GEN_A_CORE
palliative care social work note.    SW met with patients son at bedside earlier today to provide support in coping with medical issues and decision making. Palliative care following.

## 2022-07-25 NOTE — DISCHARGE NOTE PROVIDER - ATTENDING DISCHARGE PHYSICAL EXAMINATION:
General: Elderly female sitting in bed comfortably, emotionally, wanting to go home  HEENT: PERRLA. EOMI. Clear conjunctivae. Moist mucus membrane  Neck: Supple.   Chest: Good air entry. b/l basilar crackles   Heart: Normal S1 & S2 with RRR.   Abdomen: Soft. Non-tender. Non-distended. + BS  Ext: No pedal edema. No calf tenderness. Chronic skin changes.   Neuro: Awake and alert but not completely oriented. No focal deficit. Speech clear.   Skin: Warm and Dry  Psychiatry: Calm at the time of exam  Vital Signs Last 24 Hrs  T(C): 36.7 (28 Jul 2022 11:00), Max: 36.7 (28 Jul 2022 11:00)  T(F): 98 (28 Jul 2022 11:00), Max: 98 (28 Jul 2022 11:00)  HR: 100 (28 Jul 2022 11:00) (96 - 102)  BP: 129/72 (28 Jul 2022 11:00) (124/72 - 134/87)  BP(mean): --  RR: 18 (28 Jul 2022 11:00) (16 - 18)  SpO2: 94% (28 Jul 2022 11:00) (94% - 96%)    Parameters below as of 28 Jul 2022 11:00  Patient On (Oxygen Delivery Method): room air      General: Elderly female sitting in bed comfortably, emotionally, wanting to go home  HEENT: PERRLA. EOMI. Clear conjunctivae. Moist mucus membrane  Neck: Supple.   Chest: Good air entry. b/l basilar crackles   Heart: Normal S1 & S2 with RRR.   Abdomen: Soft. Non-tender. Non-distended. + BS  Ext: No pedal edema. No calf tenderness. Chronic skin changes.   Neuro: Awake and alert but not completely oriented. No focal deficit. Speech clear.   Skin: Warm and Dry  Psychiatry: Calm at the time of exam

## 2022-07-25 NOTE — DIETITIAN INITIAL EVALUATION ADULT - OTHER INFO
89 year old female with history of Dementia, Anxiety, HTN, HLD and Breast Cancer s/p Mastectomy + Chemo brought by EMS with AMS. As per daughter, patient was doing well until last night, she had a fall and was brought to the hospital. She was evaluated and later was discharged home. She was agitated when she got home but after some time she went to sleep. Later during the morning, family found her on the floor. She was confused so she was brought to the hospital. Family noticed some SOB yesterday which they were attributing to agitation. In ER, she was found to have Sepsis likely secondary to aspiration Pneumonia. Also found to have NSTEMI and PE, seen by Cardiology and was started on Heparin Infusion. Bedside ECHO with EF of 30%.

## 2022-07-25 NOTE — PROGRESS NOTE ADULT - ASSESSMENT
89 year old female with history of Dementia, Anxiety, HTN, HLD and Breast Cancer s/p Mastectomy + Chemo brought by EMS with altered mental status. As per daughter - Tami Rao, patient was doing well until yesterday. Last night, she had a fall and was brought to the hospital. She was evaluated and later was discharged home. She was agitated when she got home but after some time she went to sleep. Later during the morning, family found her on the floor. She was confused so she was brought to the hospital. Family noticed some shortness of breath yesterday which they were attributing to agitation. No fever, sick contacts or recent travel.  In ER, she was found to have Sepsis likely secondary to Pneumonia, she was given IVF and Zosyn/Vanco. Also found to have NSTEMI and PE, seen by Cardiology and was started on Heparin Infusion. Bedside ECHO with EF of 30%.     #Sepsis   - Likely secondary to Aspiration Pneumonia   - BLood culture negative   - s/p Zosyn and Vanco in ER  - Continue Zosyn until 7/27,change to augmentin at discharge  - trend CBC and fever curve  - WBC increased likely 2/2 dehydration    #Dysphagia  - Puree diet only, no liquids  - Speech therapy recalled to eval for fluid status     #NSTEMI  - H/O HTN and HLD  - ECHO as above   - s/p Heparin Infusion   - Started Aspirin   - Continue Lipitor and Metoprolol   - Cardiology follow up noted: medical management      #Acute Systolic Heart Failure   - Strict intake/output and daily weight  - ECHO as above   - Hold Lasix   - Continue Metoprolol and Losartan (increase to 25mg QD)  - Continue Farxiga   - Cardiology follow up noted      #Paroxysmal A. Fib   - Newly diagnosed   - Continue Metoprolol  - Heparin Infusion was switched to FD Lovenox (switch to DOAC on discharge)     #Acute Pulmonary Embolism  - Heparin Infusion was switched to FD Lovenox (switch to DOAC on discharge)   - No DVT on LE Doppler     #Pneumothorax  - Tiny right sided  - Likely due to pneumonia  - Monitor     #Dementia with Behavioral Symptoms  - Continue Seroquel at bedtime    #Prediabetes  - HbA1c 6.2  - Carb consistent diet     DVT Prophylaxis -- Patient is on FD Lovenox -> change to  Xarelto or Eliquis VTE dosing on discharge    Advance Directives: DNR/I. Prognosis guarded. Palliative Care Consult appreciated.    Dispo: Home Hospice after swallow eval     89 year old female with history of Dementia, Anxiety, HTN, HLD and Breast Cancer s/p Mastectomy + Chemo brought by EMS with altered mental status. As per daughter - Tami Rao, patient was doing well until yesterday. Last night, she had a fall and was brought to the hospital. She was evaluated and later was discharged home. She was agitated when she got home but after some time she went to sleep. Later during the morning, family found her on the floor. She was confused so she was brought to the hospital. Family noticed some shortness of breath yesterday which they were attributing to agitation. No fever, sick contacts or recent travel.  In ER, she was found to have Sepsis likely secondary to Pneumonia, she was given IVF and Zosyn/Vanco. Also found to have NSTEMI and PE, seen by Cardiology and was started on Heparin Infusion. Bedside ECHO with EF of 30%.     #Sepsis   - Likely secondary to Aspiration Pneumonia   - BLood culture negative   - s/p Zosyn and Vanco in ER  - Continue Zosyn until 7/27,change to augmentin at discharge  - trend CBC and fever curve  - WBC increased likely 2/2 dehydration, pt non toxic appearing, abx as above    #Dysphagia  - Puree diet only, no liquids  - Speech therapy recalled to eval for fluid status     #NSTEMI  - H/O HTN and HLD  - ECHO as above   - s/p Heparin Infusion   - Started Aspirin   - Continue Lipitor and Metoprolol   - Cardiology follow up noted: medical management      #Acute Systolic Heart Failure   - Strict intake/output and daily weight  - ECHO as above   - Hold Lasix   - Continue Metoprolol and Losartan (increase to 25mg QD)  - Continue Farxiga   - Cardiology follow up noted      #Paroxysmal A. Fib   - Newly diagnosed   - Continue Metoprolol  - Heparin Infusion was switched to FD Lovenox (switch to DOAC on discharge)     #Acute Pulmonary Embolism  - Heparin Infusion was switched to FD Lovenox (switch to DOAC on discharge)   - No DVT on LE Doppler     #Pneumothorax  - Tiny right sided  - Likely due to pneumonia  - Monitor     #Dementia with Behavioral Symptoms  - Continue Seroquel at bedtime    #Prediabetes  - HbA1c 6.2  - Carb consistent diet     DVT Prophylaxis -- Patient is on FD Lovenox -> change to  Xarelto or Eliquis VTE dosing on discharge    Advance Directives: DNR/I. Prognosis guarded. Palliative Care Consult appreciated.    Dispo: Home Hospice after swallow eval

## 2022-07-25 NOTE — PROGRESS NOTE ADULT - ASSESSMENT
89F with dementia, anxiety, HTN, HLD, remote breast cancer s/p mastectomy/chemotherapy told in remission ( > 10 years ago), admitted with pneumonia, acute PE, NSTEMI, hypoxic respiratory failure.     #1 Hypoxic respiratory failure  - improved  - off oxygen  - being treated for pnuemonia  - on medical management for NSTEMI and PE     #2 NSTEMI, acute systolic chf    - not a candidate for further cardiac workup or evaluation at this time  - on medical management   - TTE as above EF 20-25%     #3 Dementia   - moderate stage, was still able to dress herself feed herself  - supportive measures and reorientation     #4 Anxiety   - add xanax PRN low dose    #5 Dysphagia  - on puree diet, re-evaluation for liquids per medical team     #6 Encounter for palliative care  - home hospice pending re-evaluation by speech and swallow for fluids (currently on puree no liquids)

## 2022-07-25 NOTE — DIETITIAN INITIAL EVALUATION ADULT - ORAL INTAKE PTA/DIET HISTORY
Pt  at bedside limited information provided, unaware UBW or Pt PO intake in house or PTA. Per aide at bedside Pt with good PO intake this morning with assistance, though very poor yesterday. Awrae Pt s/p SLP cathy (7/21) recommending puree/no liquid, plan for reassessment today for possible diet advancement.

## 2022-07-25 NOTE — DIETITIAN INITIAL EVALUATION ADULT - ETIOLOGY
related to inability to meet sufficient protein-energy needs in setting of hypoxemia likely 2/2 aspiration PNA, advanced age, dysphagia, poss lung malignancy

## 2022-07-25 NOTE — DISCHARGE NOTE PROVIDER - HOSPITAL COURSE
89 year old female with history of Dementia, Anxiety, HTN, HLD and Breast Cancer s/p Mastectomy + Chemo brought by EMS with altered mental status. As per daughter - Tami Rao, patient was doing well until yesterday. Last night, she had a fall and was brought to the hospital. She was evaluated and later was discharged home. She was agitated when she got home but after some time she went to sleep. Later during the morning, family found her on the floor. She was confused so she was brought to the hospital. Family noticed some shortness of breath yesterday which they were attributing to agitation, admitted for aspiration PNA started on Zosyn - will dc with augmentin to complete course. Course further c/b NSTEMI, acute HF, new PE and new Afib. Cards called, will management conservatively with medications for all these problems. Started on Losartan, Dapagliflozin, BB (for HF and Afib) as well as Lovenox for PE, with plan to discharge with NOAC. Seen by palliative care and family opted for home hospice. Stable for discharge.

## 2022-07-25 NOTE — DIETITIAN NUTRITION RISK NOTIFICATION - ADDITIONAL COMMENTS/DIETITIAN RECOMMENDATIONS
1) Liberalize DASH/TLC and Munir restrictions to increase menu options on pureed diet  2) Add Ensure Pudding TID  3) Rx MVI and vit C 500mg daily  4) Encourage HBV protein sources  5) Provide encouragement/assistance as needed during mealtimes to inc PO

## 2022-07-25 NOTE — PROGRESS NOTE ADULT - SUBJECTIVE AND OBJECTIVE BOX
Walter Mcqueen M.D.    Patient is a 89y old  Female who presents with a chief complaint of AMS (24 Jul 2022 11:36)      SUBJECTIVE / OVERNIGHT EVENTS: no concerns. Wondering when she can go home.     Patient denies chest pain, SOB, abd pain, N/V, fever, chills, dysuria or any other complaints. All remainder ROS negative.     MEDICATIONS  (STANDING):  aspirin  chewable 81 milliGRAM(s) Oral daily  atorvastatin 40 milliGRAM(s) Oral at bedtime  dapagliflozin 10 milliGRAM(s) Oral daily  enoxaparin Injectable 50 milliGRAM(s) SubCutaneous every 12 hours  losartan 25 milliGRAM(s) Oral daily  metoprolol tartrate 25 milliGRAM(s) Oral two times a day  mirtazapine 7.5 milliGRAM(s) Oral at bedtime  multivitamin 1 Tablet(s) Oral daily  piperacillin/tazobactam IVPB.. 3.375 Gram(s) IV Intermittent every 8 hours  potassium chloride  10 mEq/100 mL IVPB 10 milliEquivalent(s) IV Intermittent every 1 hour  QUEtiapine 25 milliGRAM(s) Oral at bedtime    MEDICATIONS  (PRN):  acetaminophen     Tablet .. 650 milliGRAM(s) Oral every 6 hours PRN Temp greater or equal to 38C (100.4F), Mild Pain (1 - 3), Moderate Pain (4 - 6)  ALPRAZolam 0.25 milliGRAM(s) Oral every 8 hours PRN anxiety  metoprolol tartrate Injectable 5 milliGRAM(s) IV Push every 6 hours PRN for sustain Afib >120s  ondansetron Injectable 4 milliGRAM(s) IV Push every 6 hours PRN Nausea and/or Vomiting      I&O's Summary      PHYSICAL EXAM:  Vital Signs Last 24 Hrs  T(C): 36.4 (25 Jul 2022 05:47), Max: 36.8 (24 Jul 2022 18:28)  T(F): 97.6 (25 Jul 2022 05:47), Max: 98.2 (24 Jul 2022 18:28)  HR: 78 (25 Jul 2022 05:47) (78 - 98)  BP: 128/74 (25 Jul 2022 05:47) (125/78 - 137/69)  BP(mean): --  RR: 18 (25 Jul 2022 05:47) (18 - 18)  SpO2: 95% (25 Jul 2022 05:47) (94% - 95%)    Parameters below as of 24 Jul 2022 18:28  Patient On (Oxygen Delivery Method): room air    General: Elderly female sitting in bed comfortably, emotionally, wanting to go home  HEENT: PERRLA. EOMI. Clear conjunctivae. Moist mucus membrane  Neck: Supple.   Chest: Good air entry. b/l basilar crackles   Heart: Normal S1 & S2 with RRR.   Abdomen: Soft. Non-tender. Non-distended. + BS  Ext: No pedal edema. No calf tenderness. Chronic skin changes.   Neuro: Awake and alert but not completely oriented. No focal deficit. Speech clear.   Skin: Warm and Dry  Psychiatry: Calm at the time of exam     LABS:                        15.1   16.35 )-----------( 227      ( 25 Jul 2022 05:06 )             47.1     07-25    143  |  101  |  9.2  ----------------------------<  75  3.3<L>   |  27.0  |  0.34<L>    Ca    8.1<L>      25 Jul 2022 05:06  Phos  2.6     07-25  Mg     2.0     07-25    TPro  6.0<L>  /  Alb  3.1<L>  /  TBili  0.7  /  DBili  x   /  AST  59<H>  /  ALT  64<H>  /  AlkPhos  55  07-25              CAPILLARY BLOOD GLUCOSE          RADIOLOGY & ADDITIONAL TESTS:  Results Reviewed:   Imaging Personally Reviewed:  Electrocardiogram Personally Reviewed:

## 2022-07-25 NOTE — DIETITIAN NUTRITION RISK NOTIFICATION - TREATMENT: THE FOLLOWING DIET HAS BEEN RECOMMENDED
Diet, Pureed:   Consistent Carbohydrate {No Snacks} (CSTCHO)  DASH/TLC {Sodium & Cholesterol Restricted} (DASH)  No Liquids (NOLIQUIDS) (07-22-22 @ 07:38) [Active]

## 2022-07-26 LAB
ANION GAP SERPL CALC-SCNC: 8 MMOL/L — SIGNIFICANT CHANGE UP (ref 5–17)
BUN SERPL-MCNC: 10.2 MG/DL — SIGNIFICANT CHANGE UP (ref 8–20)
CALCIUM SERPL-MCNC: 8.5 MG/DL — LOW (ref 8.6–10.2)
CHLORIDE SERPL-SCNC: 101 MMOL/L — SIGNIFICANT CHANGE UP (ref 98–107)
CO2 SERPL-SCNC: 33 MMOL/L — HIGH (ref 22–29)
CREAT SERPL-MCNC: 0.47 MG/DL — LOW (ref 0.5–1.3)
EGFR: 90 ML/MIN/1.73M2 — SIGNIFICANT CHANGE UP
GLUCOSE SERPL-MCNC: 116 MG/DL — HIGH (ref 70–99)
HCT VFR BLD CALC: 43.4 % — SIGNIFICANT CHANGE UP (ref 34.5–45)
HGB BLD-MCNC: 14.1 G/DL — SIGNIFICANT CHANGE UP (ref 11.5–15.5)
MCHC RBC-ENTMCNC: 30.5 PG — SIGNIFICANT CHANGE UP (ref 27–34)
MCHC RBC-ENTMCNC: 32.5 GM/DL — SIGNIFICANT CHANGE UP (ref 32–36)
MCV RBC AUTO: 93.7 FL — SIGNIFICANT CHANGE UP (ref 80–100)
PLATELET # BLD AUTO: 216 K/UL — SIGNIFICANT CHANGE UP (ref 150–400)
POTASSIUM SERPL-MCNC: 3.3 MMOL/L — LOW (ref 3.5–5.3)
POTASSIUM SERPL-SCNC: 3.3 MMOL/L — LOW (ref 3.5–5.3)
RAPID RVP RESULT: DETECTED
RBC # BLD: 4.63 M/UL — SIGNIFICANT CHANGE UP (ref 3.8–5.2)
RBC # FLD: 14.7 % — HIGH (ref 10.3–14.5)
SARS-COV-2 RNA SPEC QL NAA+PROBE: DETECTED
SODIUM SERPL-SCNC: 142 MMOL/L — SIGNIFICANT CHANGE UP (ref 135–145)
WBC # BLD: 17.54 K/UL — HIGH (ref 3.8–10.5)
WBC # FLD AUTO: 17.54 K/UL — HIGH (ref 3.8–10.5)

## 2022-07-26 PROCEDURE — 99232 SBSQ HOSP IP/OBS MODERATE 35: CPT

## 2022-07-26 PROCEDURE — 99233 SBSQ HOSP IP/OBS HIGH 50: CPT

## 2022-07-26 PROCEDURE — 71045 X-RAY EXAM CHEST 1 VIEW: CPT | Mod: 26

## 2022-07-26 RX ADMIN — QUETIAPINE FUMARATE 25 MILLIGRAM(S): 200 TABLET, FILM COATED ORAL at 22:06

## 2022-07-26 RX ADMIN — Medication 81 MILLIGRAM(S): at 14:22

## 2022-07-26 RX ADMIN — Medication 0.25 MILLIGRAM(S): at 17:22

## 2022-07-26 RX ADMIN — Medication 20 MILLIGRAM(S): at 07:04

## 2022-07-26 RX ADMIN — Medication 1 TABLET(S): at 14:22

## 2022-07-26 RX ADMIN — ATORVASTATIN CALCIUM 40 MILLIGRAM(S): 80 TABLET, FILM COATED ORAL at 22:06

## 2022-07-26 RX ADMIN — LOSARTAN POTASSIUM 25 MILLIGRAM(S): 100 TABLET, FILM COATED ORAL at 07:04

## 2022-07-26 RX ADMIN — PIPERACILLIN AND TAZOBACTAM 25 GRAM(S): 4; .5 INJECTION, POWDER, LYOPHILIZED, FOR SOLUTION INTRAVENOUS at 22:06

## 2022-07-26 RX ADMIN — PIPERACILLIN AND TAZOBACTAM 25 GRAM(S): 4; .5 INJECTION, POWDER, LYOPHILIZED, FOR SOLUTION INTRAVENOUS at 07:04

## 2022-07-26 RX ADMIN — DAPAGLIFLOZIN 10 MILLIGRAM(S): 10 TABLET, FILM COATED ORAL at 14:22

## 2022-07-26 RX ADMIN — ENOXAPARIN SODIUM 50 MILLIGRAM(S): 100 INJECTION SUBCUTANEOUS at 07:05

## 2022-07-26 RX ADMIN — MIRTAZAPINE 7.5 MILLIGRAM(S): 45 TABLET, ORALLY DISINTEGRATING ORAL at 22:06

## 2022-07-26 RX ADMIN — Medication 50 MILLIGRAM(S): at 07:04

## 2022-07-26 RX ADMIN — ENOXAPARIN SODIUM 50 MILLIGRAM(S): 100 INJECTION SUBCUTANEOUS at 17:23

## 2022-07-26 RX ADMIN — PIPERACILLIN AND TAZOBACTAM 25 GRAM(S): 4; .5 INJECTION, POWDER, LYOPHILIZED, FOR SOLUTION INTRAVENOUS at 14:22

## 2022-07-26 NOTE — PROGRESS NOTE ADULT - ASSESSMENT
89F with dementia, anxiety, HTN, HLD, remote breast cancer s/p mastectomy/chemotherapy told in remission ( > 10 years ago), admitted with pneumonia, acute PE, NSTEMI, hypoxic respiratory failure.     #1 Hypoxic respiratory failure  - improved  - off oxygen  - being treated for pneumonia  - on medical management for NSTEMI and PE     #2 NSTEMI, acute systolic chf    - not a candidate for further cardiac workup or evaluation at this time  - on medical management   - TTE as above EF 20-25%     #3 Dementia   - moderate stage, was still able to dress herself feed herself  - supportive measures and reorientation     #4 Anxiety   - add xanax PRN low dose    #5 Dysphagia  - on puree with mildly thick liquids     #6 Encounter for palliative care  - patient with increasing white blood cell count, but clinically non toxic, and does not appear to have any new infectious process, primary team to discuss with daughter  - home hospice when medically stabilized

## 2022-07-26 NOTE — PROGRESS NOTE ADULT - SUBJECTIVE AND OBJECTIVE BOX
OVERNIGHT EVENTS:    Present Symptoms:     Dyspnea: Mild Moderate Severe  Nausea/Vomiting: Yes No  Anxiety:  Yes No  Depression: Yes No  Fatigue: Yes No  Loss of appetite: Yes No  Constipation:     Pain:             Character-            Duration-            Effect-            Factors-            Frequency-            Location-            Severity-    Pain AD Score:  [email or web address] (press ctrl + left click to view)    Review of Systems: Reviewed                     Negative:                     Positive:  Unable to obtain due to poor mentation   All others negative    MEDICATIONS  (STANDING):  aspirin  chewable 81 milliGRAM(s) Oral daily  atorvastatin 40 milliGRAM(s) Oral at bedtime  dapagliflozin 10 milliGRAM(s) Oral daily  enoxaparin Injectable 50 milliGRAM(s) SubCutaneous every 12 hours  furosemide    Tablet 20 milliGRAM(s) Oral daily  losartan 25 milliGRAM(s) Oral daily  metoprolol succinate ER 50 milliGRAM(s) Oral daily  mirtazapine 7.5 milliGRAM(s) Oral at bedtime  multivitamin 1 Tablet(s) Oral daily  piperacillin/tazobactam IVPB.. 3.375 Gram(s) IV Intermittent every 8 hours  QUEtiapine 25 milliGRAM(s) Oral at bedtime    MEDICATIONS  (PRN):  acetaminophen     Tablet .. 650 milliGRAM(s) Oral every 6 hours PRN Temp greater or equal to 38C (100.4F), Mild Pain (1 - 3), Moderate Pain (4 - 6)  ALPRAZolam 0.25 milliGRAM(s) Oral every 8 hours PRN anxiety  metoprolol tartrate Injectable 5 milliGRAM(s) IV Push every 6 hours PRN for sustain Afib >120s  ondansetron Injectable 4 milliGRAM(s) IV Push every 6 hours PRN Nausea and/or Vomiting      PHYSICAL EXAM:    Vital Signs Last 24 Hrs  T(C): 36.5 (26 Jul 2022 11:09), Max: 36.5 (26 Jul 2022 11:09)  T(F): 97.7 (26 Jul 2022 11:09), Max: 97.7 (26 Jul 2022 11:09)  HR: 72 (26 Jul 2022 11:09) (72 - 107)  BP: 128/71 (26 Jul 2022 11:09) (116/69 - 128/71)  BP(mean): --  RR: 19 (26 Jul 2022 11:09) (18 - 20)  SpO2: 98% (26 Jul 2022 11:09) (94% - 98%)    Parameters below as of 26 Jul 2022 11:09  Patient On (Oxygen Delivery Method): room air        General: alert  oriented x ____ lethargic agitated                  cachexia  nonverbal  coma    Karnofsky:  %    HEENT: normal  dry mouth  ET tube/trach    Lungs: comfortable tachypnea/labored breathing  excessive secretions    CV: normal  tachycardia    GI: normal  distended  tender  no BS               PEG/NG/OG tube  constipation  last BM:     : normal  incontinent  oliguria/anuria  hernandez    MSK: normal  weakness  edema             ambulatory  bedbound/wheelchair bound    Skin: normal  pressure ulcers- Stage_____  no rash    LABS:                      14.1   17.54 )-----------( 216      ( 26 Jul 2022 08:58 )             43.4     07-26    142  |  101  |  10.2  ----------------------------<  116<H>  3.3<L>   |  33.0<H>  |  0.47<L>    Ca    8.5<L>      26 Jul 2022 08:58  Phos  2.6     07-25  Mg     2.0     07-25    TPro  6.0<L>  /  Alb  3.1<L>  /  TBili  0.7  /  DBili  x   /  AST  59<H>  /  ALT  64<H>  /  AlkPhos  55  07-25    I&O's Summary    25 Jul 2022 07:01  -  26 Jul 2022 07:00  --------------------------------------------------------  IN: 130 mL / OUT: 0 mL / NET: 130 mL    RADIOLOGY & ADDITIONAL STUDIES:    ADVANCE DIRECTIVES/TREATMENT PREFERENCES:  DNR/DNI  OVERNIGHT EVENTS: appears well, sitting up in bed, afebrile     Present Symptoms:     Dyspnea: none   Nausea/Vomiting:  No  Anxiety:  No  Depression: No  Fatigue: No  Loss of appetite: No  Constipation: none     Pain: none             Character-            Duration-            Effect-            Factors-            Frequency-            Location-            Severity-    Pain AD Score:  [email or web address]/[email or web address].pdf (press ctrl + left click to view)    Review of Systems: Reviewed                     Negative: no chest pain                    All others negative    MEDICATIONS  (STANDING):  aspirin  chewable 81 milliGRAM(s) Oral daily  atorvastatin 40 milliGRAM(s) Oral at bedtime  dapagliflozin 10 milliGRAM(s) Oral daily  enoxaparin Injectable 50 milliGRAM(s) SubCutaneous every 12 hours  furosemide    Tablet 20 milliGRAM(s) Oral daily  losartan 25 milliGRAM(s) Oral daily  metoprolol succinate ER 50 milliGRAM(s) Oral daily  mirtazapine 7.5 milliGRAM(s) Oral at bedtime  multivitamin 1 Tablet(s) Oral daily  piperacillin/tazobactam IVPB.. 3.375 Gram(s) IV Intermittent every 8 hours  QUEtiapine 25 milliGRAM(s) Oral at bedtime    MEDICATIONS  (PRN):  acetaminophen     Tablet .. 650 milliGRAM(s) Oral every 6 hours PRN Temp greater or equal to 38C (100.4F), Mild Pain (1 - 3), Moderate Pain (4 - 6)  ALPRAZolam 0.25 milliGRAM(s) Oral every 8 hours PRN anxiety  metoprolol tartrate Injectable 5 milliGRAM(s) IV Push every 6 hours PRN for sustain Afib >120s  ondansetron Injectable 4 milliGRAM(s) IV Push every 6 hours PRN Nausea and/or Vomiting    PHYSICAL EXAM:    Vital Signs Last 24 Hrs  T(C): 36.5 (26 Jul 2022 11:09), Max: 36.5 (26 Jul 2022 11:09)  T(F): 97.7 (26 Jul 2022 11:09), Max: 97.7 (26 Jul 2022 11:09)  HR: 72 (26 Jul 2022 11:09) (72 - 107)  BP: 128/71 (26 Jul 2022 11:09) (116/69 - 128/71)  BP(mean): --  RR: 19 (26 Jul 2022 11:09) (18 - 20)  SpO2: 98% (26 Jul 2022 11:09) (94% - 98%)    Parameters below as of 26 Jul 2022 11:09  Patient On (Oxygen Delivery Method): room air    General: alert and oriented     Karnofsky: 40 %    HEENT: normal      Lungs: comfortable     CV: normal      GI: normal      : normal      MSK: weakness      Skin: no rash    LABS:                      14.1   17.54 )-----------( 216      ( 26 Jul 2022 08:58 )             43.4     07-26    142  |  101  |  10.2  ----------------------------<  116<H>  3.3<L>   |  33.0<H>  |  0.47<L>    Ca    8.5<L>      26 Jul 2022 08:58  Phos  2.6     07-25  Mg     2.0     07-25    TPro  6.0<L>  /  Alb  3.1<L>  /  TBili  0.7  /  DBili  x   /  AST  59<H>  /  ALT  64<H>  /  AlkPhos  55  07-25    I&O's Summary    25 Jul 2022 07:01  -  26 Jul 2022 07:00  --------------------------------------------------------  IN: 130 mL / OUT: 0 mL / NET: 130 mL    RADIOLOGY & ADDITIONAL STUDIES:    ADVANCE DIRECTIVES/TREATMENT PREFERENCES:  DNR/DNI

## 2022-07-26 NOTE — PROGRESS NOTE ADULT - SUBJECTIVE AND OBJECTIVE BOX
Walter Mcqueen M.D.    Patient is a 89y old  Female who presents with a chief complaint of AMS (26 Jul 2022 11:38)      SUBJECTIVE / OVERNIGHT EVENTS: reports feeling well. Wants to go home    Patient denies chest pain, SOB, abd pain, N/V, fever, chills, dysuria or any other complaints. All remainder ROS negative.     MEDICATIONS  (STANDING):  aspirin  chewable 81 milliGRAM(s) Oral daily  atorvastatin 40 milliGRAM(s) Oral at bedtime  dapagliflozin 10 milliGRAM(s) Oral daily  enoxaparin Injectable 50 milliGRAM(s) SubCutaneous every 12 hours  furosemide    Tablet 20 milliGRAM(s) Oral daily  losartan 25 milliGRAM(s) Oral daily  metoprolol succinate ER 50 milliGRAM(s) Oral daily  mirtazapine 7.5 milliGRAM(s) Oral at bedtime  multivitamin 1 Tablet(s) Oral daily  piperacillin/tazobactam IVPB.. 3.375 Gram(s) IV Intermittent every 8 hours  QUEtiapine 25 milliGRAM(s) Oral at bedtime    MEDICATIONS  (PRN):  acetaminophen     Tablet .. 650 milliGRAM(s) Oral every 6 hours PRN Temp greater or equal to 38C (100.4F), Mild Pain (1 - 3), Moderate Pain (4 - 6)  ALPRAZolam 0.25 milliGRAM(s) Oral every 8 hours PRN anxiety  metoprolol tartrate Injectable 5 milliGRAM(s) IV Push every 6 hours PRN for sustain Afib >120s  ondansetron Injectable 4 milliGRAM(s) IV Push every 6 hours PRN Nausea and/or Vomiting      I&O's Summary    25 Jul 2022 07:01  -  26 Jul 2022 07:00  --------------------------------------------------------  IN: 130 mL / OUT: 0 mL / NET: 130 mL        PHYSICAL EXAM:  Vital Signs Last 24 Hrs  T(C): 36.5 (26 Jul 2022 11:09), Max: 36.5 (26 Jul 2022 11:09)  T(F): 97.7 (26 Jul 2022 11:09), Max: 97.7 (26 Jul 2022 11:09)  HR: 72 (26 Jul 2022 11:09) (72 - 104)  BP: 128/71 (26 Jul 2022 11:09) (116/69 - 128/71)  BP(mean): --  RR: 19 (26 Jul 2022 11:09) (18 - 19)  SpO2: 98% (26 Jul 2022 11:09) (94% - 98%)    Parameters below as of 26 Jul 2022 11:09  Patient On (Oxygen Delivery Method): room air    General: Elderly female sitting in bed comfortably, emotionally, wanting to go home  HEENT: PERRLA. EOMI. Clear conjunctivae. Moist mucus membrane  Neck: Supple.   Chest: Good air entry. b/l basilar crackles   Heart: Normal S1 & S2 with RRR.   Abdomen: Soft. Non-tender. Non-distended. + BS  Ext: No pedal edema. No calf tenderness. Chronic skin changes.   Neuro: Awake and alert but not completely oriented. No focal deficit. Speech clear.   Skin: Warm and Dry  Psychiatry: Calm at the time of exam       LABS:                        14.1   17.54 )-----------( 216      ( 26 Jul 2022 08:58 )             43.4     07-26    142  |  101  |  10.2  ----------------------------<  116<H>  3.3<L>   |  33.0<H>  |  0.47<L>    Ca    8.5<L>      26 Jul 2022 08:58  Phos  2.6     07-25  Mg     2.0     07-25    TPro  6.0<L>  /  Alb  3.1<L>  /  TBili  0.7  /  DBili  x   /  AST  59<H>  /  ALT  64<H>  /  AlkPhos  55  07-25              CAPILLARY BLOOD GLUCOSE          RADIOLOGY & ADDITIONAL TESTS:  Results Reviewed:   Imaging Personally Reviewed:  Electrocardiogram Personally Reviewed:

## 2022-07-26 NOTE — PROGRESS NOTE ADULT - SUBJECTIVE AND OBJECTIVE BOX
[facility]                                                               Office: [address] [address]                                                              Telephone: [phone]. Fax:[phone]                                                                             PROGRESS NOTE  Reason for follow up: pAfib, Acute HFrEF  Overnight: No new events.   Update: sitting up in bed, off 1:1, family at bedside denies complains and wants to go home      Review of symptoms:   Cardiac:  No chest pain. No dyspnea. No palpitations.  Respiratory: No cough. No dyspnea  Gastrointestinal: No diarrhea. No abdominal pain. No bleeding.     Past medical history: No updates.   	  Vital Signs Last 24 Hrs  T(C): 36.8 (07-26-22 @ 17:37), Max: 36.8 (07-26-22 @ 17:37)  T(F): 98.2 (07-26-22 @ 17:37), Max: 98.2 (07-26-22 @ 17:37)  HR: 100 (07-26-22 @ 17:37) (72 - 104)  BP: 130/70 (07-26-22 @ 17:37) (120/68 - 130/70)  BP(mean): --  RR: 18 (07-26-22 @ 17:37) (18 - 19)  SpO2: 91% (07-26-22 @ 17:37) (91% - 98%)  I&O's Summary    25 Jul 2022 07:01  -  26 Jul 2022 07:00  --------------------------------------------------------  IN: 130 mL / OUT: 0 mL / NET: 130 mL          PHYSICAL EXAM:  Appearance: Comfortable. No acute distress, cachetic   HEENT:  Head and neck: Atraumatic. Normocephalic.  Normal oral mucosa, PERRL, Neck is supple.   Neurologic: A&Ox 2-3, no focal deficits. EOMI, Cranial nerves are intact.  Lymphatic: No cervical lymphadenopathy  Cardiovascular: Normal S1 S2, No murmur, rubs/gallops. No JVD, No edema  Respiratory: Lungs clear to auscultation  Gastrointestinal:  Soft, Non-tender, + BS  Lower Extremities: No edema  Psychiatry: Patient is calm. No agitation. Mood & affect appropriate  Skin: No rashes/ecchymoses/cyanosis/ulcers visualized on the face, hands or feet.      CURRENT MEDICATIONS:  MEDICATIONS  (STANDING):  aspirin  chewable 81 milliGRAM(s) Oral daily  atorvastatin 40 milliGRAM(s) Oral at bedtime  dapagliflozin 10 milliGRAM(s) Oral daily  enoxaparin Injectable 50 milliGRAM(s) SubCutaneous every 12 hours  furosemide    Tablet 20 milliGRAM(s) Oral daily  losartan 25 milliGRAM(s) Oral daily  metoprolol succinate ER 50 milliGRAM(s) Oral daily  mirtazapine 7.5 milliGRAM(s) Oral at bedtime  multivitamin 1 Tablet(s) Oral daily  piperacillin/tazobactam IVPB.. 3.375 Gram(s) IV Intermittent every 8 hours  QUEtiapine 25 milliGRAM(s) Oral at bedtime    MEDICATIONS  (PRN):  acetaminophen     Tablet .. 650 milliGRAM(s) Oral every 6 hours PRN Temp greater or equal to 38C (100.4F), Mild Pain (1 - 3), Moderate Pain (4 - 6)  ALPRAZolam 0.25 milliGRAM(s) Oral every 8 hours PRN anxiety  metoprolol tartrate Injectable 5 milliGRAM(s) IV Push every 6 hours PRN for sustain Afib >120s  ondansetron Injectable 4 milliGRAM(s) IV Push every 6 hours PRN Nausea and/or Vomiting      DIAGNOSTIC TESTING:  [ ] Echocardiogram:   < from: TTE Echo Complete w/ Contrast w/ Doppler (07.21.22 @ 11:50) >    Summary:   1. Left ventricular ejection fraction, by visual estimation, is 20 to   25%.   2. Severely decreased global left ventricular systolic function.   3. Mildly increased left ventricular internal cavity size.   4. Spectral Doppler shows impaired relaxation pattern of left   ventricular myocardial filling (Grade I diastolic dysfunction).   5. Normal right ventricular size and function, inadequate estimation of   RVSP.   6. Mildly enlarged left atrium.   7. Mildly enlarged right atrium.   8. Mild mitral valve regurgitation.   9. Mild tricuspid regurgitation.  10. Moderate aortic valve stenosis.  11. Mild aortic regurgitation.  12. There is mild aortic root calcification.  13. There is no evidence of pericardial effusion.    < end of copied text >    [ ]  Catheterization:  [ ] Stress Test:      Labs:                        14.1   17.54 )-----------( 216      ( 26 Jul 2022 08:58 )             43.4     07-26    142  |  101  |  10.2  ----------------------------<  116<H>  3.3<L>   |  33.0<H>  |  0.47<L>    Ca    8.5<L>      26 Jul 2022 08:58  Phos  2.6     07-25  Mg     2.0     07-25    TPro  6.0<L>  /  Alb  3.1<L>  /  TBili  0.7  /  DBili  x   /  AST  59<H>  /  ALT  64<H>  /  AlkPhos  55  07-25 21 Jul 2022 05:43 Troponin 0.16 ng/mL / Creatine Kinase 530 U/L /  CKMB 16.7 ng/mL / CPK Mass Assay % x       20 Jul 2022 20:20 Troponin 0.20 ng/mL / Creatine Kinase 657 U/L /  CKMB 12.4 ng/mL / CPK Mass Assay % x       20 Jul 2022 11:38 Troponin 0.31 ng/mL / Creatine Kinase 334 U/L /  CKMB 11.4 ng/mL / CPK Mass Assay % x          Serum Pro-Brain Natriuretic Peptide: 17588 pg/mL (07-20-22 @ 11:38)  Serum Pro-Brain Natriuretic Peptide: 502 pg/mL (07-28-20 @ 13:19)    Cholesterol 101 mg/dL; Direct LDL --; HDL Cholesterol, Serum 56 mg/dL; HDL/ Total Cholesterol Ratio Measurement --; Total Cholesterol/ HDL Ratio Measurement --; Triglycerides, Serum 65 mg/dL  A1C with Estimated Average Glucose Result: 6.2 % (07-21-22 @ 05:43)      TELEMETRY: Sinus 100s, pAfib 130s

## 2022-07-26 NOTE — PROGRESS NOTE ADULT - ASSESSMENT
89 year old female with history of Dementia, Anxiety, HTN, HLD and Breast Cancer s/p Mastectomy + Chemo brought by EMS with altered mental status. As per daughter - Tami Rao, patient was doing well until yesterday. Last night, she had a fall and was brought to the hospital. She was evaluated and later was discharged home. She was agitated when she got home but after some time she went to sleep. Later during the morning, family found her on the floor. She was confused so she was brought to the hospital. Family noticed some shortness of breath yesterday which they were attributing to agitation. No fever, sick contacts or recent travel.  In ER, she was found to have Sepsis likely secondary to Pneumonia, she was given IVF and Zosyn/Vanco. Also found to have NSTEMI and PE, seen by Cardiology and was started on Heparin Infusion. Bedside ECHO with EF of 30%.     #Leukocytosis  - unclear source, 2/2 PE vs. PNA (already on Zosyn)  - repeat CXR, UA, BCx, and RVP  - ID called mena recs  - will consider pan-scan vs. WBC scan if persistently elevated WBC     #Sepsis   - Likely secondary to Aspiration Pneumonia   - BLood culture negative   - s/p Zosyn and Vanco in ER  - Continue Zosyn until 7/27,change to augmentin at discharge  - trend CBC and fever curve  - WBC increased likely 2/2 dehydration, pt non toxic appearing, abx as above    #Dysphagia  - Puree diet only, no liquids  - Speech therapy recalled to eval for fluid status     #NSTEMI  - H/O HTN and HLD  - ECHO as above   - s/p Heparin Infusion   - Started Aspirin   - Continue Lipitor and Metoprolol   - Cardiology follow up noted: medical management      #Acute Systolic Heart Failure   - Strict intake/output and daily weight  - ECHO as above   - Hold Lasix   - Continue Metoprolol and Losartan (increase to 25mg QD)  - Continue Farxiga   - Cardiology follow up noted      #Paroxysmal A. Fib   - Newly diagnosed   - Continue Metoprolol  - Heparin Infusion was switched to FD Lovenox (switch to DOAC on discharge)     #Acute Pulmonary Embolism  - Heparin Infusion was switched to FD Lovenox (switch to DOAC on discharge)   - No DVT on LE Doppler     #Pneumothorax  - Tiny right sided  - Likely due to pneumonia  - Monitor     #Dementia with Behavioral Symptoms  - Continue Seroquel at bedtime    #Prediabetes  - HbA1c 6.2  - Carb consistent diet     DVT Prophylaxis -- Patient is on FD Lovenox -> change to  Xarelto or Eliquis VTE dosing on discharge    Advance Directives: DNR/I. Prognosis guarded. Palliative Care Consult appreciated.    Dispo: Home Hospice after medically optimized    Daughter Tami updated of the plan of care at bedside

## 2022-07-26 NOTE — PROGRESS NOTE ADULT - ASSESSMENT
89F h/o mild dementia (per family still able to do all ADLs), anxiety, HTN, HLD, bilateral breast cancer (s/p resection, radiation and chemo twice, last 10yrs ago), no known prior cardiac history no outpatient cardiologist, was seen in the ER after fall and discharged home without blood work, returned next day afternoon with recurrent falls and worsening mental status, noted fever 103 and leukocytosis 24K, serum lactate 4.6 given 500m IVF, also pBNP 31K and Trop 0.31, newly dx Afib RVR with EKG with lateral T-wave abnormality, CTA found segmental PE, pulmonary infiltrates      Acute HFrEF  -TTE with severe cardiomyopathy LV EF 25%, suspect chronicity to underlying severe cardiomyopathy with minimal Troponin elevation, I have had an extensive discussion with patient's family/daughter Tami given her current state with unexplained severe cachexia/recurrent falls/sepsis she would be at high risk for complication from cath/PCI, recommend medical management for now and reassess candidacy for ischemic workup as outpatient.   -continue metoprolol change to succinate, low SBP resolved tolerating losartan 25mg, defer ARNi for now; continue Farxiga 10mg, appears dyspneic with crackles given IVP Lasix 20mg x1 yesterday and started PO Lasix 20mg daily    pAfib RVR  -revert betweem sinus tach and pAfib RVR, continue metoprolol may need succinate dose 100mg, if BP cannot tolerate would add low dose digoxin 0.125mg daily  -continue anticoagulation, change to DOAC on discharge    Acute segmental PE  -on LMWH, change to Xarelto or Eliquis VTE dosing on discharge    Sepsis  -WBC slow downtrending, on empiric antibiotic for pneumonia    Unexplained cachexia, frailty recurrent falls at risk for bleeding on anticoagulant  -continue nutrition support       -overall prognosis guarded given extensive comorbidities, discussed with patient's daughter (Tami) to address goals of care, both she and her brother are HCP, palliative consulted now DNR/DNI and planning home hospice care.   -cardiology will sign off, reconsult if new issue arise      Flynn Cason DO, [facility]  Faculty Non-Invasive Cardiologist  [phone].

## 2022-07-27 LAB
ANION GAP SERPL CALC-SCNC: 10 MMOL/L — SIGNIFICANT CHANGE UP (ref 5–17)
BASOPHILS # BLD AUTO: 0.05 K/UL — SIGNIFICANT CHANGE UP (ref 0–0.2)
BASOPHILS NFR BLD AUTO: 0.4 % — SIGNIFICANT CHANGE UP (ref 0–2)
BUN SERPL-MCNC: 10.8 MG/DL — SIGNIFICANT CHANGE UP (ref 8–20)
CALCIUM SERPL-MCNC: 8.8 MG/DL — SIGNIFICANT CHANGE UP (ref 8.4–10.5)
CHLORIDE SERPL-SCNC: 101 MMOL/L — SIGNIFICANT CHANGE UP (ref 98–107)
CO2 SERPL-SCNC: 34 MMOL/L — HIGH (ref 22–29)
CREAT SERPL-MCNC: 0.42 MG/DL — LOW (ref 0.5–1.3)
EGFR: 93 ML/MIN/1.73M2 — SIGNIFICANT CHANGE UP
EOSINOPHIL # BLD AUTO: 0.12 K/UL — SIGNIFICANT CHANGE UP (ref 0–0.5)
EOSINOPHIL NFR BLD AUTO: 0.9 % — SIGNIFICANT CHANGE UP (ref 0–6)
GLUCOSE SERPL-MCNC: 95 MG/DL — SIGNIFICANT CHANGE UP (ref 70–99)
HCT VFR BLD CALC: 44.5 % — SIGNIFICANT CHANGE UP (ref 34.5–45)
HGB BLD-MCNC: 14.5 G/DL — SIGNIFICANT CHANGE UP (ref 11.5–15.5)
IMM GRANULOCYTES NFR BLD AUTO: 0.6 % — SIGNIFICANT CHANGE UP (ref 0–1.5)
LYMPHOCYTES # BLD AUTO: 1.33 K/UL — SIGNIFICANT CHANGE UP (ref 1–3.3)
LYMPHOCYTES # BLD AUTO: 9.6 % — LOW (ref 13–44)
MAGNESIUM SERPL-MCNC: 2.2 MG/DL — SIGNIFICANT CHANGE UP (ref 1.6–2.6)
MCHC RBC-ENTMCNC: 31 PG — SIGNIFICANT CHANGE UP (ref 27–34)
MCHC RBC-ENTMCNC: 32.6 GM/DL — SIGNIFICANT CHANGE UP (ref 32–36)
MCV RBC AUTO: 95.1 FL — SIGNIFICANT CHANGE UP (ref 80–100)
MONOCYTES # BLD AUTO: 0.91 K/UL — HIGH (ref 0–0.9)
MONOCYTES NFR BLD AUTO: 6.5 % — SIGNIFICANT CHANGE UP (ref 2–14)
NEUTROPHILS # BLD AUTO: 11.4 K/UL — HIGH (ref 1.8–7.4)
NEUTROPHILS NFR BLD AUTO: 82 % — HIGH (ref 43–77)
PHOSPHATE SERPL-MCNC: 3.9 MG/DL — SIGNIFICANT CHANGE UP (ref 2.4–4.7)
PLATELET # BLD AUTO: 233 K/UL — SIGNIFICANT CHANGE UP (ref 150–400)
POTASSIUM SERPL-MCNC: 3.6 MMOL/L — SIGNIFICANT CHANGE UP (ref 3.5–5.3)
POTASSIUM SERPL-SCNC: 3.6 MMOL/L — SIGNIFICANT CHANGE UP (ref 3.5–5.3)
PROCALCITONIN SERPL-MCNC: 0.07 NG/ML — SIGNIFICANT CHANGE UP (ref 0.02–0.1)
RBC # BLD: 4.68 M/UL — SIGNIFICANT CHANGE UP (ref 3.8–5.2)
RBC # FLD: 14.8 % — HIGH (ref 10.3–14.5)
SODIUM SERPL-SCNC: 145 MMOL/L — SIGNIFICANT CHANGE UP (ref 135–145)
WBC # BLD: 13.9 K/UL — HIGH (ref 3.8–10.5)
WBC # FLD AUTO: 13.9 K/UL — HIGH (ref 3.8–10.5)

## 2022-07-27 PROCEDURE — 99223 1ST HOSP IP/OBS HIGH 75: CPT

## 2022-07-27 PROCEDURE — 99232 SBSQ HOSP IP/OBS MODERATE 35: CPT

## 2022-07-27 PROCEDURE — 99233 SBSQ HOSP IP/OBS HIGH 50: CPT

## 2022-07-27 RX ORDER — REMDESIVIR 5 MG/ML
200 INJECTION INTRAVENOUS EVERY 24 HOURS
Refills: 0 | Status: COMPLETED | OUTPATIENT
Start: 2022-07-27 | End: 2022-07-27

## 2022-07-27 RX ORDER — PIPERACILLIN AND TAZOBACTAM 4; .5 G/20ML; G/20ML
3.38 INJECTION, POWDER, LYOPHILIZED, FOR SOLUTION INTRAVENOUS EVERY 8 HOURS
Refills: 0 | Status: DISCONTINUED | OUTPATIENT
Start: 2022-07-27 | End: 2022-07-28

## 2022-07-27 RX ORDER — REMDESIVIR 5 MG/ML
100 INJECTION INTRAVENOUS EVERY 24 HOURS
Refills: 0 | Status: DISCONTINUED | OUTPATIENT
Start: 2022-07-28 | End: 2022-07-28

## 2022-07-27 RX ORDER — REMDESIVIR 5 MG/ML
INJECTION INTRAVENOUS
Refills: 0 | Status: DISCONTINUED | OUTPATIENT
Start: 2022-07-27 | End: 2022-07-28

## 2022-07-27 RX ORDER — DEXAMETHASONE 0.5 MG/5ML
6 ELIXIR ORAL DAILY
Refills: 0 | Status: DISCONTINUED | OUTPATIENT
Start: 2022-07-27 | End: 2022-07-28

## 2022-07-27 RX ADMIN — ENOXAPARIN SODIUM 50 MILLIGRAM(S): 100 INJECTION SUBCUTANEOUS at 18:17

## 2022-07-27 RX ADMIN — Medication 1 TABLET(S): at 11:01

## 2022-07-27 RX ADMIN — DAPAGLIFLOZIN 10 MILLIGRAM(S): 10 TABLET, FILM COATED ORAL at 11:01

## 2022-07-27 RX ADMIN — Medication 20 MILLIGRAM(S): at 06:09

## 2022-07-27 RX ADMIN — Medication 50 MILLIGRAM(S): at 06:09

## 2022-07-27 RX ADMIN — PIPERACILLIN AND TAZOBACTAM 25 GRAM(S): 4; .5 INJECTION, POWDER, LYOPHILIZED, FOR SOLUTION INTRAVENOUS at 06:09

## 2022-07-27 RX ADMIN — MIRTAZAPINE 7.5 MILLIGRAM(S): 45 TABLET, ORALLY DISINTEGRATING ORAL at 21:16

## 2022-07-27 RX ADMIN — Medication 81 MILLIGRAM(S): at 11:01

## 2022-07-27 RX ADMIN — PIPERACILLIN AND TAZOBACTAM 25 GRAM(S): 4; .5 INJECTION, POWDER, LYOPHILIZED, FOR SOLUTION INTRAVENOUS at 15:04

## 2022-07-27 RX ADMIN — LOSARTAN POTASSIUM 25 MILLIGRAM(S): 100 TABLET, FILM COATED ORAL at 06:09

## 2022-07-27 RX ADMIN — REMDESIVIR 500 MILLIGRAM(S): 5 INJECTION INTRAVENOUS at 11:01

## 2022-07-27 RX ADMIN — ENOXAPARIN SODIUM 50 MILLIGRAM(S): 100 INJECTION SUBCUTANEOUS at 06:09

## 2022-07-27 RX ADMIN — Medication 0.25 MILLIGRAM(S): at 15:58

## 2022-07-27 RX ADMIN — Medication 6 MILLIGRAM(S): at 11:01

## 2022-07-27 RX ADMIN — ATORVASTATIN CALCIUM 40 MILLIGRAM(S): 80 TABLET, FILM COATED ORAL at 21:17

## 2022-07-27 RX ADMIN — PIPERACILLIN AND TAZOBACTAM 25 GRAM(S): 4; .5 INJECTION, POWDER, LYOPHILIZED, FOR SOLUTION INTRAVENOUS at 21:16

## 2022-07-27 RX ADMIN — QUETIAPINE FUMARATE 25 MILLIGRAM(S): 200 TABLET, FILM COATED ORAL at 21:17

## 2022-07-27 NOTE — PROGRESS NOTE ADULT - ASSESSMENT
89 year old female with history of Dementia, Anxiety, HTN, HLD and Breast Cancer s/p Mastectomy + Chemo brought by EMS with altered mental status. As per daughter - Tami Rao, patient was doing well until yesterday. Last night, she had a fall and was brought to the hospital. She was evaluated and later was discharged home. She was agitated when she got home but after some time she went to sleep. Later during the morning, family found her on the floor. She was confused so she was brought to the hospital. Family noticed some shortness of breath yesterday which they were attributing to agitation. No fever, sick contacts or recent travel.  In ER, she was found to have Sepsis likely secondary to Pneumonia, she was given IVF and Zosyn/Vanco. Also found to have NSTEMI and PE, seen by Cardiology and was started on Heparin Infusion. Bedside ECHO with EF of 30%.     #COVID+ PNA  - COVID+ overnight, pt not hypoxic  - given high risk population and family wants all medical management, start Remdesivir and Decadron x 3 days     #Sepsis   - Likely secondary to Aspiration Pneumonia   - BLood culture negative   - s/p Zosyn and Vanco in ER  - s/p Zosyn last dose 7/27  - trend CBC and fever curve  - WBC increased likely 2/2 dehydration, pt non toxic appearing, abx as above    #Dysphagia  - Puree diet only, mild thick liquid  - aspiration precaution     #NSTEMI  - H/O HTN and HLD  - ECHO as above   - s/p Heparin Infusion   - c/w Aspirin   - Continue Lipitor and Metoprolol   - Cardiology follow up noted: medical management      #Acute Systolic Heart Failure   - Strict intake/output and daily weight  - ECHO as above   - Lasix 20mg QD  - Continue Metoprolol and Losartan (increase to 25mg QD)  - Continue Farxiga   - Cardiology follow up noted      #Paroxysmal A. Fib   - Newly diagnosed   - Continue Metoprolol  - Heparin Infusion was switched to FD Lovenox (switch to DOAC on discharge)     #Acute Pulmonary Embolism  - Heparin Infusion was switched to FD Lovenox (switch to DOAC on discharge)   - No DVT on LE Doppler     #Pneumothorax  - Tiny right sided  - Likely due to pneumonia  - Monitor     #Dementia with Behavioral Symptoms  - Continue Seroquel at bedtime    #Prediabetes  - HbA1c 6.2  - Carb consistent diet     DVT Prophylaxis -- Patient is on FD Lovenox -> change to  Xarelto or Eliquis VTE dosing on discharge    Advance Directives: DNR/I. Palliative Care Consult appreciated.    Dispo: Home Hospice after medically optimized   89 year old female with history of Dementia, Anxiety, HTN, HLD and Breast Cancer s/p Mastectomy + Chemo brought by EMS with altered mental status. As per daughter - Tami Rao, patient was doing well until yesterday. Last night, she had a fall and was brought to the hospital. She was evaluated and later was discharged home. She was agitated when she got home but after some time she went to sleep. Later during the morning, family found her on the floor. She was confused so she was brought to the hospital. Family noticed some shortness of breath yesterday which they were attributing to agitation. No fever, sick contacts or recent travel.  In ER, she was found to have Sepsis likely secondary to Pneumonia, she was given IVF and Zosyn/Vanco. Also found to have NSTEMI and PE, seen by Cardiology and was started on Heparin Infusion. Bedside ECHO with EF of 30%.     #COVID+ PNA  - COVID+ overnight, pt not hypoxic  - given high risk population and family wants all medical management, start Remdesivir and Decadron x 3 days     #Sepsis   - Likely secondary to Aspiration Pneumonia   - BLood culture negative   - s/p Zosyn and Vanco in ER  - c/w Zosyn, plan for 10 days given concern for chronic aspiration  - ID following, mena recs  - trend CBC and fever curve  - WBC increased likely 2/2 dehydration, pt non toxic appearing, abx as above    #Dysphagia  - Puree diet only, mild thick liquid  - aspiration precaution     #NSTEMI  - H/O HTN and HLD  - ECHO as above   - s/p Heparin Infusion   - c/w Aspirin   - Continue Lipitor and Metoprolol   - Cardiology follow up noted: medical management      #Acute Systolic Heart Failure   - Strict intake/output and daily weight  - ECHO as above   - Lasix 20mg QD  - Continue Metoprolol and Losartan (increase to 25mg QD)  - Continue Farxiga   - Cardiology follow up noted      #Paroxysmal A. Fib   - Newly diagnosed   - Continue Metoprolol  - Heparin Infusion was switched to FD Lovenox (switch to DOAC on discharge)     #Acute Pulmonary Embolism  - Heparin Infusion was switched to FD Lovenox (switch to DOAC on discharge)   - No DVT on LE Doppler     #Pneumothorax  - Tiny right sided  - Likely due to pneumonia  - Monitor     #Dementia with Behavioral Symptoms  - Continue Seroquel at bedtime    #Prediabetes  - HbA1c 6.2  - Carb consistent diet     DVT Prophylaxis -- Patient is on FD Lovenox -> change to  Xarelto or Eliquis VTE dosing on discharge    Advance Directives: DNR/I. Palliative Care Consult appreciated.    Dispo: Home Hospice after medically optimized

## 2022-07-27 NOTE — PROGRESS NOTE ADULT - ASSESSMENT
89F with dementia, anxiety, HTN, HLD, remote breast cancer s/p mastectomy/chemotherapy told in remission ( > 10 years ago), admitted with pneumonia, acute PE, NSTEMI, hypoxic respiratory failure, incidentally COVID +.     #1 Hypoxic respiratory failure  - improved  - off oxygen  - being treated for pneumonia  - on medical management for NSTEMI and PE   - incidentally COVID +     #2 NSTEMI, acute systolic chf    - not a candidate for further cardiac workup or evaluation at this time  - on medical management   - TTE as above EF 20-25%     #3 Dementia   - moderate stage, was still able to dress herself feed herself  - supportive measures and reorientation     #4 Anxiety   - add xanax PRN low dose    #5 Dysphagia  - on puree with mildly thick liquids     #6 Encounter for palliative care  - home hospice once stabilized

## 2022-07-27 NOTE — CONSULT NOTE ADULT - ASSESSMENT
89 year old female with history of Dementia, Anxiety, HTN, HLD and Breast Cancer s/p Mastectomy + Chemo brought by EMS with altered mental status.   In ER, she was found to have Sepsis likely secondary to Pneumonia, she was given IVF and Zosyn/Vanco. Also found to have NSTEMI and PE, seen by Cardiology and was started on Heparin Infusion. Bedside ECHO with EF of 30%.was being treated for aspiration pneumonia on zosyn and noted to have persistent leukocytosis. Found to be COVID + on 7/26/22 during leukocytosis workup. Per daughter covid vaccinated x 3.    Leukocytosis-multifactorial (pneumonia, possible ongoing aspiration, reactive from PE, NSTEMI) improving, continue to monitor-however now on steroids for covid. f/u BCX till final results    Suspected aspiration pneumonia- complete zosyn x 10d, aspiration precautions, puree diet no liquids as per swallow eval- will remain at risk for aspiration despite zosyn. f/u final BCX    COVID- stable on RA, agree with remdesivir and decadron x 3 days, can extend for 5-10d course if becomes hypoxic, contact/airborne iso, monitor cbc, cmp, ferritin, CRP    PE- on AC    NSTEMI/CHF-medical management per cardiology      plan is likely for home hospice  d/w Dr Mcqueen, pt, daughter at bedside    signing off. please call back if any questions/concerns or change in status

## 2022-07-27 NOTE — PROGRESS NOTE ADULT - TIME BILLING
Acute HFrEF, pAfib, PE
review of flowsheets and temp curve. discussion with bedside RN Isabelle regarding COVID status
chart review, lab review, imaging review, notes review. Discussion with  and coordination of care with all relevant providers and consultants caring for patient. discussion with complex care  Denisse regarding hospice services. review of chart regarding diet and weekend events
pAfib RVR, Acute HFrEF
Acute HFrEF, pAfib RVR, NSTEMI
NSTEMI, pAfib RVR, Sepsis, segmental PE

## 2022-07-27 NOTE — CONSULT NOTE ADULT - CONSULT REASON
Elevated troponin
leukocytosis
" NSTEMI / Acute Systolic Heart Failure / Acute PE.   Not a candidate for any intervention.  Needs GOC."

## 2022-07-27 NOTE — PROGRESS NOTE ADULT - SUBJECTIVE AND OBJECTIVE BOX
Walter Mcqueen M.D.    Patient is a 89y old  Female who presents with a chief complaint of AMS (27 Jul 2022 09:37)      SUBJECTIVE / OVERNIGHT EVENTS: COVID+ overnight.     Patient denies chest pain, SOB, abd pain, N/V, fever, chills, dysuria or any other complaints. All remainder ROS negative.     MEDICATIONS  (STANDING):  aspirin  chewable 81 milliGRAM(s) Oral daily  atorvastatin 40 milliGRAM(s) Oral at bedtime  dapagliflozin 10 milliGRAM(s) Oral daily  dexAMETHasone   Concentrate 6 milliGRAM(s) Oral daily  enoxaparin Injectable 50 milliGRAM(s) SubCutaneous every 12 hours  furosemide    Tablet 20 milliGRAM(s) Oral daily  losartan 25 milliGRAM(s) Oral daily  metoprolol succinate ER 50 milliGRAM(s) Oral daily  mirtazapine 7.5 milliGRAM(s) Oral at bedtime  multivitamin 1 Tablet(s) Oral daily  piperacillin/tazobactam IVPB.. 3.375 Gram(s) IV Intermittent every 8 hours  QUEtiapine 25 milliGRAM(s) Oral at bedtime  remdesivir  IVPB   IV Intermittent   remdesivir  IVPB 200 milliGRAM(s) IV Intermittent every 24 hours    MEDICATIONS  (PRN):  acetaminophen     Tablet .. 650 milliGRAM(s) Oral every 6 hours PRN Temp greater or equal to 38C (100.4F), Mild Pain (1 - 3), Moderate Pain (4 - 6)  ALPRAZolam 0.25 milliGRAM(s) Oral every 8 hours PRN anxiety  metoprolol tartrate Injectable 5 milliGRAM(s) IV Push every 6 hours PRN for sustain Afib >120s  ondansetron Injectable 4 milliGRAM(s) IV Push every 6 hours PRN Nausea and/or Vomiting      I&O's Summary      PHYSICAL EXAM:  Vital Signs Last 24 Hrs  T(C): 36.4 (27 Jul 2022 05:02), Max: 36.8 (26 Jul 2022 17:37)  T(F): 97.6 (27 Jul 2022 05:02), Max: 98.2 (26 Jul 2022 17:37)  HR: 90 (27 Jul 2022 05:02) (72 - 100)  BP: 132/75 (27 Jul 2022 05:02) (128/71 - 132/75)  BP(mean): --  RR: 18 (27 Jul 2022 05:02) (18 - 19)  SpO2: 93% (27 Jul 2022 05:02) (91% - 98%)    Parameters below as of 27 Jul 2022 05:02  Patient On (Oxygen Delivery Method): room air    General: Elderly female sitting in bed comfortably  HEENT: PERRLA. EOMI. Clear conjunctivae. Moist mucus membrane  Neck: Supple.   Chest: Good air entry. b/l course breath sound, unchanged   Heart: Normal S1 & S2 with RRR.   Abdomen: Soft. Non-tender. Non-distended. + BS  Ext: No pedal edema. No calf tenderness. Chronic skin changes.   Neuro: Awake and alert but not completely oriented. No focal deficit. Speech clear.   Skin: Warm and Dry  Psychiatry: Calm at the time of exam     LABS:                        14.5   13.90 )-----------( 233      ( 27 Jul 2022 04:20 )             44.5     07-27    145  |  101  |  10.8  ----------------------------<  95  3.6   |  34.0<H>  |  0.42<L>    Ca    8.8      27 Jul 2022 04:20  Phos  3.9     07-27  Mg     2.2     07-27                CAPILLARY BLOOD GLUCOSE          RADIOLOGY & ADDITIONAL TESTS:  Results Reviewed:   Imaging Personally Reviewed:  Electrocardiogram Personally Reviewed:

## 2022-07-27 NOTE — PROGRESS NOTE ADULT - SUBJECTIVE AND OBJECTIVE BOX
OVERNIGHT EVENTS:    Present Symptoms:     Dyspnea: Mild Moderate Severe  Nausea/Vomiting: Yes No  Anxiety:  Yes No  Depression: Yes No  Fatigue: Yes No  Loss of appetite: Yes No  Constipation:     Pain:             Character-            Duration-            Effect-            Factors-            Frequency-            Location-            Severity-    Pain AD Score:  [email or web address] (press ctrl + left click to view)    Review of Systems: Reviewed                     Negative:                     Positive:  Unable to obtain due to poor mentation   All others negative    MEDICATIONS  (STANDING):  aspirin  chewable 81 milliGRAM(s) Oral daily  atorvastatin 40 milliGRAM(s) Oral at bedtime  dapagliflozin 10 milliGRAM(s) Oral daily  dexAMETHasone   Concentrate 6 milliGRAM(s) Oral daily  enoxaparin Injectable 50 milliGRAM(s) SubCutaneous every 12 hours  furosemide    Tablet 20 milliGRAM(s) Oral daily  losartan 25 milliGRAM(s) Oral daily  metoprolol succinate ER 50 milliGRAM(s) Oral daily  mirtazapine 7.5 milliGRAM(s) Oral at bedtime  multivitamin 1 Tablet(s) Oral daily  piperacillin/tazobactam IVPB.. 3.375 Gram(s) IV Intermittent every 8 hours  QUEtiapine 25 milliGRAM(s) Oral at bedtime  remdesivir  IVPB   IV Intermittent   remdesivir  IVPB 200 milliGRAM(s) IV Intermittent every 24 hours    MEDICATIONS  (PRN):  acetaminophen     Tablet .. 650 milliGRAM(s) Oral every 6 hours PRN Temp greater or equal to 38C (100.4F), Mild Pain (1 - 3), Moderate Pain (4 - 6)  ALPRAZolam 0.25 milliGRAM(s) Oral every 8 hours PRN anxiety  metoprolol tartrate Injectable 5 milliGRAM(s) IV Push every 6 hours PRN for sustain Afib >120s  ondansetron Injectable 4 milliGRAM(s) IV Push every 6 hours PRN Nausea and/or Vomiting      PHYSICAL EXAM:    Vital Signs Last 24 Hrs  T(C): 36.4 (27 Jul 2022 05:02), Max: 36.8 (26 Jul 2022 17:37)  T(F): 97.6 (27 Jul 2022 05:02), Max: 98.2 (26 Jul 2022 17:37)  HR: 90 (27 Jul 2022 05:02) (72 - 100)  BP: 132/75 (27 Jul 2022 05:02) (128/71 - 132/75)  BP(mean): --  RR: 18 (27 Jul 2022 05:02) (18 - 19)  SpO2: 93% (27 Jul 2022 05:02) (91% - 98%)    Parameters below as of 27 Jul 2022 05:02  Patient On (Oxygen Delivery Method): room air        General: alert  oriented x ____ lethargic agitated                  cachexia  nonverbal  coma    Karnofsky:  %    HEENT: normal  dry mouth  ET tube/trach    Lungs: comfortable tachypnea/labored breathing  excessive secretions    CV: normal  tachycardia    GI: normal  distended  tender  no BS               PEG/NG/OG tube  constipation  last BM:     : normal  incontinent  oliguria/anuria  hernandez    MSK: normal  weakness  edema             ambulatory  bedbound/wheelchair bound    Skin: normal  pressure ulcers- Stage_____  no rash    LABS:                          14.5   13.90 )-----------( 233      ( 27 Jul 2022 04:20 )             44.5     07-27    145  |  101  |  10.8  ----------------------------<  95  3.6   |  34.0<H>  |  0.42<L>    Ca    8.8      27 Jul 2022 04:20  Phos  3.9     07-27  Mg     2.2     07-27          I&O's Summary      RADIOLOGY & ADDITIONAL STUDIES:    ADVANCE DIRECTIVES/TREATMENT PREFERENCES:  DNR YES NO  Completed on:                     MOLST  YES NO   Completed on:  Living Will  YES NO   Completed on: OVERNIGHT EVENTS: COVID +     Present Symptoms:     Dyspnea: none   Nausea/Vomiting: No  Anxiety:  Yes No  Depression: Yes No  Fatigue: Yes No  Loss of appetite: Yes No  Constipation:     Pain:             Character-            Duration-            Effect-            Factors-            Frequency-            Location-            Severity-    Pain AD Score:  [email or web address]/[email or web address] (press ctrl + left click to view)    Review of Systems: Reviewed                     Negative:                     Positive:  Unable to obtain due to poor mentation   All others negative    MEDICATIONS  (STANDING):  aspirin  chewable 81 milliGRAM(s) Oral daily  atorvastatin 40 milliGRAM(s) Oral at bedtime  dapagliflozin 10 milliGRAM(s) Oral daily  dexAMETHasone   Concentrate 6 milliGRAM(s) Oral daily  enoxaparin Injectable 50 milliGRAM(s) SubCutaneous every 12 hours  furosemide    Tablet 20 milliGRAM(s) Oral daily  losartan 25 milliGRAM(s) Oral daily  metoprolol succinate ER 50 milliGRAM(s) Oral daily  mirtazapine 7.5 milliGRAM(s) Oral at bedtime  multivitamin 1 Tablet(s) Oral daily  piperacillin/tazobactam IVPB.. 3.375 Gram(s) IV Intermittent every 8 hours  QUEtiapine 25 milliGRAM(s) Oral at bedtime  remdesivir  IVPB   IV Intermittent   remdesivir  IVPB 200 milliGRAM(s) IV Intermittent every 24 hours    MEDICATIONS  (PRN):  acetaminophen     Tablet .. 650 milliGRAM(s) Oral every 6 hours PRN Temp greater or equal to 38C (100.4F), Mild Pain (1 - 3), Moderate Pain (4 - 6)  ALPRAZolam 0.25 milliGRAM(s) Oral every 8 hours PRN anxiety  metoprolol tartrate Injectable 5 milliGRAM(s) IV Push every 6 hours PRN for sustain Afib >120s  ondansetron Injectable 4 milliGRAM(s) IV Push every 6 hours PRN Nausea and/or Vomiting      PHYSICAL EXAM:    Vital Signs Last 24 Hrs  T(C): 36.4 (27 Jul 2022 05:02), Max: 36.8 (26 Jul 2022 17:37)  T(F): 97.6 (27 Jul 2022 05:02), Max: 98.2 (26 Jul 2022 17:37)  HR: 90 (27 Jul 2022 05:02) (72 - 100)  BP: 132/75 (27 Jul 2022 05:02) (128/71 - 132/75)  BP(mean): --  RR: 18 (27 Jul 2022 05:02) (18 - 19)  SpO2: 93% (27 Jul 2022 05:02) (91% - 98%)    Parameters below as of 27 Jul 2022 05:02  Patient On (Oxygen Delivery Method): room air        General: alert  oriented x ____ lethargic agitated                  cachexia  nonverbal  coma    Karnofsky:  %    HEENT: normal  dry mouth  ET tube/trach    Lungs: comfortable tachypnea/labored breathing  excessive secretions    CV: normal  tachycardia    GI: normal  distended  tender  no BS               PEG/NG/OG tube  constipation  last BM:     : normal  incontinent  oliguria/anuria  hernandez    MSK: normal  weakness  edema             ambulatory  bedbound/wheelchair bound    Skin: normal  pressure ulcers- Stage_____  no rash    LABS:                          14.5   13.90 )-----------( 233      ( 27 Jul 2022 04:20 )             44.5     07-27    145  |  101  |  10.8  ----------------------------<  95  3.6   |  34.0<H>  |  0.42<L>    Ca    8.8      27 Jul 2022 04:20  Phos  3.9     07-27  Mg     2.2     07-27          I&O's Summary      RADIOLOGY & ADDITIONAL STUDIES:    ADVANCE DIRECTIVES/TREATMENT PREFERENCES:  DNR YES NO  Completed on:                     MOLST  YES NO   Completed on:  Living Will  YES NO   Completed on: OVERNIGHT EVENTS: COVID +     Present Symptoms:     Dyspnea: none   Nausea/Vomiting: No  Anxiety:  No  Depression: unable   Fatigue: no   Loss of appetite: unable   Constipation: none     Pain: none             Character-            Duration-            Effect-            Factors-            Frequency-            Location-            Severity-    Pain AD Score:  [email or web address]/[email or web address] (press ctrl + left click to view)    Review of Systems: Reviewed                     Negative: no chest pain                 All others negative    MEDICATIONS  (STANDING):  aspirin  chewable 81 milliGRAM(s) Oral daily  atorvastatin 40 milliGRAM(s) Oral at bedtime  dapagliflozin 10 milliGRAM(s) Oral daily  dexAMETHasone   Concentrate 6 milliGRAM(s) Oral daily  enoxaparin Injectable 50 milliGRAM(s) SubCutaneous every 12 hours  furosemide    Tablet 20 milliGRAM(s) Oral daily  losartan 25 milliGRAM(s) Oral daily  metoprolol succinate ER 50 milliGRAM(s) Oral daily  mirtazapine 7.5 milliGRAM(s) Oral at bedtime  multivitamin 1 Tablet(s) Oral daily  piperacillin/tazobactam IVPB.. 3.375 Gram(s) IV Intermittent every 8 hours  QUEtiapine 25 milliGRAM(s) Oral at bedtime  remdesivir  IVPB   IV Intermittent   remdesivir  IVPB 200 milliGRAM(s) IV Intermittent every 24 hours    MEDICATIONS  (PRN):  acetaminophen     Tablet .. 650 milliGRAM(s) Oral every 6 hours PRN Temp greater or equal to 38C (100.4F), Mild Pain (1 - 3), Moderate Pain (4 - 6)  ALPRAZolam 0.25 milliGRAM(s) Oral every 8 hours PRN anxiety  metoprolol tartrate Injectable 5 milliGRAM(s) IV Push every 6 hours PRN for sustain Afib >120s  ondansetron Injectable 4 milliGRAM(s) IV Push every 6 hours PRN Nausea and/or Vomiting    PHYSICAL EXAM:    Vital Signs Last 24 Hrs  T(C): 36.4 (27 Jul 2022 05:02), Max: 36.8 (26 Jul 2022 17:37)  T(F): 97.6 (27 Jul 2022 05:02), Max: 98.2 (26 Jul 2022 17:37)  HR: 90 (27 Jul 2022 05:02) (72 - 100)  BP: 132/75 (27 Jul 2022 05:02) (128/71 - 132/75)  BP(mean): --  RR: 18 (27 Jul 2022 05:02) (18 - 19)  SpO2: 93% (27 Jul 2022 05:02) (91% - 98%)    Parameters below as of 27 Jul 2022 05:02  Patient On (Oxygen Delivery Method): room air    General: alert     Karnofsky:  40 %    HEENT: normal     Lungs: comfortable     CV: normal      GI: normal     : normal     MSK: weakness     Skin: no rash    LABS:                      14.5   13.90 )-----------( 233      ( 27 Jul 2022 04:20 )             44.5     07-27    145  |  101  |  10.8  ----------------------------<  95  3.6   |  34.0<H>  |  0.42<L>    Ca    8.8      27 Jul 2022 04:20  Phos  3.9     07-27  Mg     2.2     07-27    I&O's Summary    RADIOLOGY & ADDITIONAL STUDIES:    ADVANCE DIRECTIVES/TREATMENT PREFERENCES:  DNR/DNI

## 2022-07-27 NOTE — PROGRESS NOTE ADULT - NUTRITIONAL ASSESSMENT
This patient has been assessed with a concern for Malnutrition and has been determined to have a diagnosis/diagnoses of Moderate protein-calorie malnutrition.    This patient is being managed with:   Diet Pureed-  Consistent Carbohydrate {No Snacks} (CSTCHO)  DASH/TLC {Sodium & Cholesterol Restricted} (DASH)  Mildly Thick Liquids (MILDTHICKLIQS)  Entered: Jul 26 2022  8:01AM    
This patient has been assessed with a concern for Malnutrition and has been determined to have a diagnosis/diagnoses of Moderate protein-calorie malnutrition.    This patient is being managed with:   Diet Pureed-  Consistent Carbohydrate {No Snacks} (CSTCHO)  DASH/TLC {Sodium & Cholesterol Restricted} (DASH)  Mildly Thick Liquids (MILDTHICKLIQS)  Entered: Jul 26 2022  8:01AM

## 2022-07-27 NOTE — CONSULT NOTE ADULT - SUBJECTIVE AND OBJECTIVE BOX
[facility]                                                INFECTIOUS DISEASES  =======================================================                               Quentin Gamez MD#  Raul Young MD*                                     Addie Presley MD*    Amelia Bolton MD*            Diplomates American Board of Internal Medicine & Infectious Diseases                  # [place] Office - Appt - Tel  [phone] Fax [phone]                * [place] Office - Appt - Tel [phone] Fax [phone]                                  Hospital Consult line:  [phone]  =======================================================      MRN-[id number]  JIM CHEW   HPI:  89 year old female with history of Dementia, Anxiety, HTN, HLD and Breast Cancer s/p Mastectomy + Chemo brought by EMS with altered mental status. As per daughter - Tami Chew, patient was doing well until yesterday. Last night, she had a fall and was brought to the hospital. She was evaluated and later was discharged home. She was agitated when she got home but after some time she went to sleep. Later during the morning, family found her on the floor. She was confused so she was brought to the hospital. Family noticed some shortness of breath yesterday which they were attributing to agitation. No fever, sick contacts or recent travel.  In ER, she was found to have Sepsis likely secondary to Pneumonia, she was given IVF and Zosyn/Vanco. Also found to have NSTEMI and PE, seen by Cardiology and was started on Heparin Infusion. Bedside ECHO with EF of 30%.  (20 Jul 2022 17:27)          I have personally reviewed the labs and data; pertinent labs and data are listed in this note; please see below.   =======================================================  Past Medical & Surgical Hx:  =====================  PAST MEDICAL & SURGICAL HISTORY:  Dementia      HLD (hyperlipidemia)      HTN (hypertension)      Anxiety      Breast cancer      S/P mastectomy        Problem List:  ==========  HEALTH ISSUES - PROBLEM Dx:  NSTEMI (non-ST elevation myocardial infarction)    Pulmonary embolism    Lung malignancy    Sepsis, unspecified organism    Afib          Social Hx:  =======  no toxic habits currently    FAMILY HISTORY:  No pertinent family history in first degree relatives    no significant family history of immunosuppressive disorders in mother or father   =======================================================    REVIEW OF SYSTEMS:  CONSTITUTIONAL:  No Fever or chills  HEENT:  No diplopia or blurred vision.  No earache, sore throat or runny nose.  CARDIOVASCULAR:  No pressure, squeezing, strangling, tightness, heaviness or aching about the chest, neck, axilla or epigastrium.  RESPIRATORY:  No cough, shortness of breath  GASTROINTESTINAL:  No nausea, vomiting or diarrhea.  GENITOURINARY:  No dysuria, frequency or urgency. No Blood in urine  MUSCULOSKELETAL:  no joint aches, no muscle pain  SKIN:  No change in skin, hair or nails.  NEUROLOGIC:  No Headaches, seizures or weakness.  PSYCHIATRIC:  No disorder of thought or mood.  ENDOCRINE:  No heat or cold intolerance  HEMATOLOGICAL:  No easy bruising or bleeding.    =======================================================  Allergies    No Known Allergies    Intolerances    Antibiotics:  piperacillin/tazobactam IVPB.. 3.375 Gram(s) IV Intermittent every 8 hours  remdesivir  IVPB   IV Intermittent     Other medications:  aspirin  chewable 81 milliGRAM(s) Oral daily  atorvastatin 40 milliGRAM(s) Oral at bedtime  dapagliflozin 10 milliGRAM(s) Oral daily  dexAMETHasone   Concentrate 6 milliGRAM(s) Oral daily  enoxaparin Injectable 50 milliGRAM(s) SubCutaneous every 12 hours  furosemide    Tablet 20 milliGRAM(s) Oral daily  losartan 25 milliGRAM(s) Oral daily  metoprolol succinate ER 50 milliGRAM(s) Oral daily  mirtazapine 7.5 milliGRAM(s) Oral at bedtime  multivitamin 1 Tablet(s) Oral daily  QUEtiapine 25 milliGRAM(s) Oral at bedtime   cefTRIAXone   IVPB   100 mL/Hr IV Intermittent (07-20-22 @ 11:55)    piperacillin/tazobactam IVPB.   200 mL/Hr IV Intermittent (07-20-22 @ 15:21)    piperacillin/tazobactam IVPB..   25 mL/Hr IV Intermittent (07-20-22 @ 23:57)   25 mL/Hr IV Intermittent (07-21-22 @ 05:44)   25 mL/Hr IV Intermittent (07-21-22 @ 14:57)   25 mL/Hr IV Intermittent (07-21-22 @ 23:50)   25 mL/Hr IV Intermittent (07-22-22 @ 05:17)   25 mL/Hr IV Intermittent (07-22-22 @ 13:37)   25 mL/Hr IV Intermittent (07-22-22 @ 21:17)   25 mL/Hr IV Intermittent (07-23-22 @ 05:33)   25 mL/Hr IV Intermittent (07-23-22 @ 15:39)   25 mL/Hr IV Intermittent (07-23-22 @ 22:16)   25 mL/Hr IV Intermittent (07-24-22 @ 07:01)   25 mL/Hr IV Intermittent (07-24-22 @ 11:25)   25 mL/Hr IV Intermittent (07-24-22 @ 23:46)   25 mL/Hr IV Intermittent (07-25-22 @ 06:02)   25 mL/Hr IV Intermittent (07-25-22 @ 13:15)   25 mL/Hr IV Intermittent (07-25-22 @ 22:13)   25 mL/Hr IV Intermittent (07-26-22 @ 07:04)   25 mL/Hr IV Intermittent (07-26-22 @ 14:22)   25 mL/Hr IV Intermittent (07-26-22 @ 22:06)   25 mL/Hr IV Intermittent (07-27-22 @ 06:09)    piperacillin/tazobactam IVPB..   25 mL/Hr IV Intermittent (07-27-22 @ 15:04)    remdesivir  IVPB   500 mL/Hr IV Intermittent (07-27-22 @ 11:01)    vancomycin  IVPB.   250 mL/Hr IV Intermittent (07-20-22 @ 17:00)      ======================================================  Physical Exam:  ============  T(F): 97.6 (27 Jul 2022 17:23), Max: 97.7 (27 Jul 2022 11:01)  HR: 96 (27 Jul 2022 17:23)  BP: 134/87 (27 Jul 2022 17:23)  RR: 18 (27 Jul 2022 17:23)  SpO2: 96% (27 Jul 2022 17:23) (93% - 96%)  temp max in last 48H T(F): , Max: 98.2 (07-26-22 @ 17:37)    General:  No acute distress.  Eye: Pupils are equal, round and reactive to light, Extraocular movements are intact, Normal conjunctiva.  HENT: Normocephalic, Oral mucosa is moist, No pharyngeal erythema, No sinus tenderness.  Neck: Supple, No lymphadenopathy.  Respiratory: Lungs are clear to auscultation, Respirations are non-labored.  Cardiovascular: Normal rate, Regular rhythm, s1 + s2  Gastrointestinal: Soft, Non-tender, Non-distended, Normal bowel sounds.  Genitourinary: No costovertebral angle tenderness.  Lymphatics: No lymphadenopathy neck,   Musculoskeletal: Normal range of motion, Normal strength.  Integumentary: No rash.  Neurologic: Alert, Oriented, No focal deficits, Cranial Nerves II-XII are grossly intact.  Psychiatric: Appropriate mood & affect.    =======================================================  Labs:                        14.5   13.90 )-----------( 233      ( 27 Jul 2022 04:20 )             44.5     07-27    145  |  101  |  10.8  ----------------------------<  95  3.6   |  34.0<H>  |  0.42<L>    Ca    8.8      27 Jul 2022 04:20  Phos  3.9     07-27  Mg     2.2     07-27        Culture - Blood (collected 07-26-22 @ 14:33)  Source: .Blood Blood    Culture - Blood (collected 07-26-22 @ 13:46)  Source: .Blood Blood    Culture - Blood (collected 07-20-22 @ 11:44)  Source: .Blood Blood-Peripheral  Final Report (07-25-22 @ 18:00):    No Growth Final    Culture - Blood (collected 07-20-22 @ 11:38)  Source: .Blood Blood-Peripheral  Final Report (07-25-22 @ 18:00):    No Growth Final       SARS-CoV-2: Detected (07-26-22 @ 18:35)  SARS-CoV-2: NotDetec (07-20-22 @ 13:13)     < from: CT Abdomen and Pelvis w/ IV Cont (07.20.22 @ 14:29) >  IMPRESSION: Segmental right upper lobe pulmonary embolism.   Redemonstration of minimal right pneumothorax.  Small right pleural effusion again seen. Patchy infiltrates and nodular   densities most confluent in the left lower lobe again seen.  No acute pathology in the abdomen or pelvis  Results were discussed with the provider Johnna Turcios at 3:35 PM on   7/20/2022    < end of copied text >

## 2022-07-28 ENCOUNTER — TRANSCRIPTION ENCOUNTER (OUTPATIENT)
Age: 87
End: 2022-07-28

## 2022-07-28 VITALS
SYSTOLIC BLOOD PRESSURE: 129 MMHG | TEMPERATURE: 98 F | HEART RATE: 100 BPM | DIASTOLIC BLOOD PRESSURE: 72 MMHG | RESPIRATION RATE: 18 BRPM | OXYGEN SATURATION: 94 %

## 2022-07-28 LAB — SARS-COV-2 RNA SPEC QL NAA+PROBE: SIGNIFICANT CHANGE UP

## 2022-07-28 PROCEDURE — 83605 ASSAY OF LACTIC ACID: CPT

## 2022-07-28 PROCEDURE — 83036 HEMOGLOBIN GLYCOSYLATED A1C: CPT

## 2022-07-28 PROCEDURE — 99232 SBSQ HOSP IP/OBS MODERATE 35: CPT

## 2022-07-28 PROCEDURE — 71045 X-RAY EXAM CHEST 1 VIEW: CPT

## 2022-07-28 PROCEDURE — 82550 ASSAY OF CK (CPK): CPT

## 2022-07-28 PROCEDURE — 84100 ASSAY OF PHOSPHORUS: CPT

## 2022-07-28 PROCEDURE — 96366 THER/PROPH/DIAG IV INF ADDON: CPT

## 2022-07-28 PROCEDURE — 80061 LIPID PANEL: CPT

## 2022-07-28 PROCEDURE — 83880 ASSAY OF NATRIURETIC PEPTIDE: CPT

## 2022-07-28 PROCEDURE — 96365 THER/PROPH/DIAG IV INF INIT: CPT

## 2022-07-28 PROCEDURE — 85610 PROTHROMBIN TIME: CPT

## 2022-07-28 PROCEDURE — 93005 ELECTROCARDIOGRAM TRACING: CPT

## 2022-07-28 PROCEDURE — 85014 HEMATOCRIT: CPT

## 2022-07-28 PROCEDURE — 93970 EXTREMITY STUDY: CPT

## 2022-07-28 PROCEDURE — 82330 ASSAY OF CALCIUM: CPT

## 2022-07-28 PROCEDURE — 74176 CT ABD & PELVIS W/O CONTRAST: CPT | Mod: MA

## 2022-07-28 PROCEDURE — 92526 ORAL FUNCTION THERAPY: CPT

## 2022-07-28 PROCEDURE — 70450 CT HEAD/BRAIN W/O DYE: CPT | Mod: MA

## 2022-07-28 PROCEDURE — C8929: CPT

## 2022-07-28 PROCEDURE — U0003: CPT

## 2022-07-28 PROCEDURE — 84132 ASSAY OF SERUM POTASSIUM: CPT

## 2022-07-28 PROCEDURE — 99285 EMERGENCY DEPT VISIT HI MDM: CPT | Mod: 25

## 2022-07-28 PROCEDURE — 82435 ASSAY OF BLOOD CHLORIDE: CPT

## 2022-07-28 PROCEDURE — 85027 COMPLETE CBC AUTOMATED: CPT

## 2022-07-28 PROCEDURE — 36415 COLL VENOUS BLD VENIPUNCTURE: CPT

## 2022-07-28 PROCEDURE — 85018 HEMOGLOBIN: CPT

## 2022-07-28 PROCEDURE — 82553 CREATINE MB FRACTION: CPT

## 2022-07-28 PROCEDURE — 84484 ASSAY OF TROPONIN QUANT: CPT

## 2022-07-28 PROCEDURE — 87040 BLOOD CULTURE FOR BACTERIA: CPT

## 2022-07-28 PROCEDURE — 85025 COMPLETE CBC W/AUTO DIFF WBC: CPT

## 2022-07-28 PROCEDURE — 71275 CT ANGIOGRAPHY CHEST: CPT | Mod: MA

## 2022-07-28 PROCEDURE — 80048 BASIC METABOLIC PNL TOTAL CA: CPT

## 2022-07-28 PROCEDURE — 82947 ASSAY GLUCOSE BLOOD QUANT: CPT

## 2022-07-28 PROCEDURE — 84295 ASSAY OF SERUM SODIUM: CPT

## 2022-07-28 PROCEDURE — 96375 TX/PRO/DX INJ NEW DRUG ADDON: CPT

## 2022-07-28 PROCEDURE — 85730 THROMBOPLASTIN TIME PARTIAL: CPT

## 2022-07-28 PROCEDURE — 72125 CT NECK SPINE W/O DYE: CPT | Mod: MA

## 2022-07-28 PROCEDURE — 82803 BLOOD GASES ANY COMBINATION: CPT

## 2022-07-28 PROCEDURE — 74177 CT ABD & PELVIS W/CONTRAST: CPT | Mod: MA

## 2022-07-28 PROCEDURE — 99239 HOSP IP/OBS DSCHRG MGMT >30: CPT

## 2022-07-28 PROCEDURE — 0225U NFCT DS DNA&RNA 21 SARSCOV2: CPT

## 2022-07-28 PROCEDURE — 92610 EVALUATE SWALLOWING FUNCTION: CPT

## 2022-07-28 PROCEDURE — 71250 CT THORAX DX C-: CPT | Mod: MA

## 2022-07-28 PROCEDURE — 80053 COMPREHEN METABOLIC PANEL: CPT

## 2022-07-28 PROCEDURE — U0005: CPT

## 2022-07-28 PROCEDURE — 84145 PROCALCITONIN (PCT): CPT

## 2022-07-28 PROCEDURE — 83735 ASSAY OF MAGNESIUM: CPT

## 2022-07-28 RX ORDER — DAPAGLIFLOZIN 10 MG/1
1 TABLET, FILM COATED ORAL
Qty: 30 | Refills: 0
Start: 2022-07-28 | End: 2022-08-26

## 2022-07-28 RX ORDER — METOPROLOL TARTRATE 50 MG
1 TABLET ORAL
Qty: 30 | Refills: 0
Start: 2022-07-28 | End: 2022-08-26

## 2022-07-28 RX ORDER — MIRTAZAPINE 45 MG/1
1 TABLET, ORALLY DISINTEGRATING ORAL
Qty: 30 | Refills: 0
Start: 2022-07-28 | End: 2022-08-26

## 2022-07-28 RX ORDER — FUROSEMIDE 40 MG
1 TABLET ORAL
Qty: 30 | Refills: 0
Start: 2022-07-28 | End: 2022-08-26

## 2022-07-28 RX ORDER — APIXABAN 2.5 MG/1
1 TABLET, FILM COATED ORAL
Qty: 60 | Refills: 0
Start: 2022-07-28 | End: 2022-08-26

## 2022-07-28 RX ORDER — LOSARTAN POTASSIUM 100 MG/1
1 TABLET, FILM COATED ORAL
Qty: 30 | Refills: 0
Start: 2022-07-28 | End: 2022-08-26

## 2022-07-28 RX ORDER — METOPROLOL TARTRATE 50 MG
1 TABLET ORAL
Qty: 0 | Refills: 0 | DISCHARGE

## 2022-07-28 RX ORDER — PROTRIPTYLINE HCL 5 MG
1 TABLET ORAL
Qty: 0 | Refills: 0 | DISCHARGE

## 2022-07-28 RX ADMIN — Medication 81 MILLIGRAM(S): at 11:35

## 2022-07-28 RX ADMIN — REMDESIVIR 500 MILLIGRAM(S): 5 INJECTION INTRAVENOUS at 11:34

## 2022-07-28 RX ADMIN — DAPAGLIFLOZIN 10 MILLIGRAM(S): 10 TABLET, FILM COATED ORAL at 11:36

## 2022-07-28 RX ADMIN — Medication 6 MILLIGRAM(S): at 05:43

## 2022-07-28 RX ADMIN — ENOXAPARIN SODIUM 50 MILLIGRAM(S): 100 INJECTION SUBCUTANEOUS at 05:42

## 2022-07-28 RX ADMIN — PIPERACILLIN AND TAZOBACTAM 25 GRAM(S): 4; .5 INJECTION, POWDER, LYOPHILIZED, FOR SOLUTION INTRAVENOUS at 05:43

## 2022-07-28 RX ADMIN — Medication 1 TABLET(S): at 11:35

## 2022-07-28 RX ADMIN — Medication 0.25 MILLIGRAM(S): at 11:35

## 2022-07-28 RX ADMIN — LOSARTAN POTASSIUM 25 MILLIGRAM(S): 100 TABLET, FILM COATED ORAL at 05:43

## 2022-07-28 RX ADMIN — Medication 20 MILLIGRAM(S): at 05:43

## 2022-07-28 RX ADMIN — Medication 50 MILLIGRAM(S): at 05:43

## 2022-07-28 NOTE — DISCHARGE NOTE NURSING/CASE MANAGEMENT/SOCIAL WORK - PATIENT PORTAL LINK FT
You can access the FollowMyHealth Patient Portal offered by [facility] by registering at the following website: [email or web address]. By joining [employer]’s FollowMyHealth portal, you will also be able to view your health information using other applications (apps) compatible with our system.

## 2022-07-28 NOTE — DISCHARGE NOTE NURSING/CASE MANAGEMENT/SOCIAL WORK - NSDCPEFALRISK_GEN_ALL_CORE
For information on Fall & Injury Prevention, visit: [email or web address] OR  [email or web address] OR  https://www.cdc.gov/steadi/patient.html

## 2022-07-28 NOTE — PROGRESS NOTE ADULT - PROVIDER SPECIALTY LIST ADULT
Cardiology
Hospitalist
Palliative Care
Cardiology
Hospitalist
Cardiology
Hospitalist
Palliative Care
Hospitalist
Cardiology

## 2022-07-28 NOTE — PROGRESS NOTE ADULT - SUBJECTIVE AND OBJECTIVE BOX
OVERNIGHT EVENTS:    Present Symptoms:     Dyspnea: Mild Moderate Severe  Nausea/Vomiting: Yes No  Anxiety:  Yes No  Depression: Yes No  Fatigue: Yes No  Loss of appetite: Yes No  Constipation:     Pain:             Character-            Duration-            Effect-            Factors-            Frequency-            Location-            Severity-    Pain AD Score:  [email or web address] (press ctrl + left click to view)    Review of Systems: Reviewed                     Negative:                     Positive:  Unable to obtain due to poor mentation   All others negative    MEDICATIONS  (STANDING):  aspirin  chewable 81 milliGRAM(s) Oral daily  atorvastatin 40 milliGRAM(s) Oral at bedtime  dapagliflozin 10 milliGRAM(s) Oral daily  dexAMETHasone   Concentrate 6 milliGRAM(s) Oral daily  enoxaparin Injectable 50 milliGRAM(s) SubCutaneous every 12 hours  furosemide    Tablet 20 milliGRAM(s) Oral daily  losartan 25 milliGRAM(s) Oral daily  metoprolol succinate ER 50 milliGRAM(s) Oral daily  mirtazapine 7.5 milliGRAM(s) Oral at bedtime  multivitamin 1 Tablet(s) Oral daily  piperacillin/tazobactam IVPB.. 3.375 Gram(s) IV Intermittent every 8 hours  QUEtiapine 25 milliGRAM(s) Oral at bedtime  remdesivir  IVPB   IV Intermittent   remdesivir  IVPB 100 milliGRAM(s) IV Intermittent every 24 hours    MEDICATIONS  (PRN):  acetaminophen     Tablet .. 650 milliGRAM(s) Oral every 6 hours PRN Temp greater or equal to 38C (100.4F), Mild Pain (1 - 3), Moderate Pain (4 - 6)  ALPRAZolam 0.25 milliGRAM(s) Oral every 8 hours PRN anxiety  metoprolol tartrate Injectable 5 milliGRAM(s) IV Push every 6 hours PRN for sustain Afib >120s  ondansetron Injectable 4 milliGRAM(s) IV Push every 6 hours PRN Nausea and/or Vomiting      PHYSICAL EXAM:    Vital Signs Last 24 Hrs  T(C): 36.7 (28 Jul 2022 11:00), Max: 36.7 (28 Jul 2022 11:00)  T(F): 98 (28 Jul 2022 11:00), Max: 98 (28 Jul 2022 11:00)  HR: 100 (28 Jul 2022 11:00) (96 - 102)  BP: 129/72 (28 Jul 2022 11:00) (124/72 - 134/87)  BP(mean): --  RR: 18 (28 Jul 2022 11:00) (16 - 18)  SpO2: 94% (28 Jul 2022 11:00) (94% - 96%)    Parameters below as of 28 Jul 2022 11:00  Patient On (Oxygen Delivery Method): room air        General: alert  oriented x ____ lethargic agitated                  cachexia  nonverbal  coma    Karnofsky:  %    HEENT: normal  dry mouth  ET tube/trach    Lungs: comfortable tachypnea/labored breathing  excessive secretions    CV: normal  tachycardia    GI: normal  distended  tender  no BS               PEG/NG/OG tube  constipation  last BM:     : normal  incontinent  oliguria/anuria  hernandez    MSK: normal  weakness  edema             ambulatory  bedbound/wheelchair bound    Skin: normal  pressure ulcers- Stage_____  no rash    LABS:                      14.5   13.90 )-----------( 233      ( 27 Jul 2022 04:20 )             44.5     07-27    145  |  101  |  10.8  ----------------------------<  95  3.6   |  34.0<H>  |  0.42<L>    Ca    8.8      27 Jul 2022 04:20  Phos  3.9     07-27  Mg     2.2     07-27          I&O's Summary    27 Jul 2022 07:01  -  28 Jul 2022 07:00  --------------------------------------------------------  IN: 180 mL / OUT: 300 mL / NET: -120 mL        RADIOLOGY & ADDITIONAL STUDIES:    ADVANCE DIRECTIVES/TREATMENT PREFERENCES:  DNR YES NO  Completed on:                     MOLST  YES NO   Completed on:  Living Will  YES NO   Completed on: OVERNIGHT EVENTS: + COVID, initiated on remdesevir     Present Symptoms:     Dyspnea: none   Nausea/Vomiting: No  Anxiety:  No  Depression: no   Fatigue: No  Loss of appetite: Yes No  Constipation: none     Pain: none currently             Character-            Duration-            Effect-            Factors-            Frequency-            Location-            Severity-    Pain AD Score:  [email or web address] (press ctrl + left click to view)    Review of Systems: Reviewed                     Negative: no chest pain                     All others negative    MEDICATIONS  (STANDING):  aspirin  chewable 81 milliGRAM(s) Oral daily  atorvastatin 40 milliGRAM(s) Oral at bedtime  dapagliflozin 10 milliGRAM(s) Oral daily  dexAMETHasone   Concentrate 6 milliGRAM(s) Oral daily  enoxaparin Injectable 50 milliGRAM(s) SubCutaneous every 12 hours  furosemide    Tablet 20 milliGRAM(s) Oral daily  losartan 25 milliGRAM(s) Oral daily  metoprolol succinate ER 50 milliGRAM(s) Oral daily  mirtazapine 7.5 milliGRAM(s) Oral at bedtime  multivitamin 1 Tablet(s) Oral daily  piperacillin/tazobactam IVPB.. 3.375 Gram(s) IV Intermittent every 8 hours  QUEtiapine 25 milliGRAM(s) Oral at bedtime  remdesivir  IVPB   IV Intermittent   remdesivir  IVPB 100 milliGRAM(s) IV Intermittent every 24 hours    MEDICATIONS  (PRN):  acetaminophen     Tablet .. 650 milliGRAM(s) Oral every 6 hours PRN Temp greater or equal to 38C (100.4F), Mild Pain (1 - 3), Moderate Pain (4 - 6)  ALPRAZolam 0.25 milliGRAM(s) Oral every 8 hours PRN anxiety  metoprolol tartrate Injectable 5 milliGRAM(s) IV Push every 6 hours PRN for sustain Afib >120s  ondansetron Injectable 4 milliGRAM(s) IV Push every 6 hours PRN Nausea and/or Vomiting    PHYSICAL EXAM:    Vital Signs Last 24 Hrs  T(C): 36.7 (28 Jul 2022 11:00), Max: 36.7 (28 Jul 2022 11:00)  T(F): 98 (28 Jul 2022 11:00), Max: 98 (28 Jul 2022 11:00)  HR: 100 (28 Jul 2022 11:00) (96 - 102)  BP: 129/72 (28 Jul 2022 11:00) (124/72 - 134/87)  BP(mean): --  RR: 18 (28 Jul 2022 11:00) (16 - 18)  SpO2: 94% (28 Jul 2022 11:00) (94% - 96%)    Parameters below as of 28 Jul 2022 11:00  Patient On (Oxygen Delivery Method): room air    General: alert      Karnofsky:  30-40 %    HEENT: normal      Lungs: comfortable    CV: normal      GI: normal  distended  tender  no BS               PEG/NG/OG tube  constipation  last BM:     : normal  incontinent  oliguria/anuria  hernandez    MSK: normal  weakness  edema             ambulatory  bedbound/wheelchair bound    Skin: normal  pressure ulcers- Stage_____  no rash    LABS:                      14.5   13.90 )-----------( 233      ( 27 Jul 2022 04:20 )             44.5     07-27    145  |  101  |  10.8  ----------------------------<  95  3.6   |  34.0<H>  |  0.42<L>    Ca    8.8      27 Jul 2022 04:20  Phos  3.9     07-27  Mg     2.2     07-27          I&O's Summary    27 Jul 2022 07:01  -  28 Jul 2022 07:00  --------------------------------------------------------  IN: 180 mL / OUT: 300 mL / NET: -120 mL        RADIOLOGY & ADDITIONAL STUDIES:    ADVANCE DIRECTIVES/TREATMENT PREFERENCES:  DNR YES NO  Completed on:                     MOLST  YES NO   Completed on:  Living Will  YES NO   Completed on:

## 2022-07-31 LAB
CULTURE RESULTS: SIGNIFICANT CHANGE UP
CULTURE RESULTS: SIGNIFICANT CHANGE UP
SPECIMEN SOURCE: SIGNIFICANT CHANGE UP
SPECIMEN SOURCE: SIGNIFICANT CHANGE UP

## 2022-11-20 NOTE — SWALLOW BEDSIDE ASSESSMENT ADULT - SWALLOW EVAL: DIAGNOSIS
Name band;
Pt presents w/ at least mild-moderate oral dysphagia. Suspected pharyngeal dysphagia w/ mild and moderately thick liquids

## 2023-05-17 NOTE — ED ADULT TRIAGE NOTE - ARRIVAL FROM
Beth Geiger Latha Cormier RRT.     Home

## 2023-07-12 NOTE — PATIENT PROFILE ADULT - FALL HARM RISK - FALLEN IN PAST
PATIENT IS ADVISED TO FOLLOW UP WITH US AS NEEDED:       Advil or Aleve as needed. One or the other, not both.    Tylenol as needed for pain, no more than 4000 mg daily (from all sources).    DO HOME EXERCISES AS INSTRUCTED BY  P.T    USE ICE AS NEEDED TO HELP WITH PAIN    I.T. BAND STRETCHES    LAY ON THE BED AND LET THE LEG HANG OVER THE SIDE OF THE BED, KEEP THE LEG STRAIGHT AND PULL IT TOWARDS THE HEAD     DO THE SEATED STRETCH:  PUT LEFT  HEEL ON THE RT KNEE AND THEN PULL THE LEFT  KNEE TOWARDS THE RT SHOULDER     WE WILL NOT  GET X-RAYS AT THE NEXT VISIT.    CALL ORTHOPEDICS .[phone] FOR ANY OTHER QUESTIONS OR  ISSUES     Discussed healthy lifestyles to include Weight loss, stopping smoking, decreasing, alcohol consumption,exercising as well as good dietary choices    All questions and concerns were answered to the best my ability    No